# Patient Record
Sex: MALE | Race: WHITE | Employment: OTHER | URBAN - NONMETROPOLITAN AREA
[De-identification: names, ages, dates, MRNs, and addresses within clinical notes are randomized per-mention and may not be internally consistent; named-entity substitution may affect disease eponyms.]

---

## 2017-03-29 ENCOUNTER — OFFICE VISIT (OUTPATIENT)
Age: 67
End: 2017-03-29

## 2017-03-29 VITALS
HEART RATE: 76 BPM | DIASTOLIC BLOOD PRESSURE: 94 MMHG | BODY MASS INDEX: 31.78 KG/M2 | WEIGHT: 222 LBS | TEMPERATURE: 98.2 F | SYSTOLIC BLOOD PRESSURE: 154 MMHG | HEIGHT: 70 IN | RESPIRATION RATE: 16 BRPM

## 2017-03-29 DIAGNOSIS — K63.89 MASS OF CECUM: Primary | ICD-10-CM

## 2017-03-29 DIAGNOSIS — Z01.818 PRE-OP TESTING: ICD-10-CM

## 2017-03-29 PROCEDURE — G8417 CALC BMI ABV UP PARAM F/U: HCPCS | Performed by: SURGERY

## 2017-03-29 PROCEDURE — G8427 DOCREV CUR MEDS BY ELIG CLIN: HCPCS | Performed by: SURGERY

## 2017-03-29 PROCEDURE — 4040F PNEUMOC VAC/ADMIN/RCVD: CPT | Performed by: SURGERY

## 2017-03-29 PROCEDURE — 1036F TOBACCO NON-USER: CPT | Performed by: SURGERY

## 2017-03-29 PROCEDURE — 3017F COLORECTAL CA SCREEN DOC REV: CPT | Performed by: SURGERY

## 2017-03-29 PROCEDURE — 99204 OFFICE O/P NEW MOD 45 MIN: CPT | Performed by: SURGERY

## 2017-03-29 PROCEDURE — G8484 FLU IMMUNIZE NO ADMIN: HCPCS | Performed by: SURGERY

## 2017-03-29 RX ORDER — METRONIDAZOLE 500 MG/1
TABLET ORAL
Qty: 3 TABLET | Refills: 0 | Status: ON HOLD | OUTPATIENT
Start: 2017-03-29 | End: 2017-04-07 | Stop reason: HOSPADM

## 2017-03-29 ASSESSMENT — ENCOUNTER SYMPTOMS
COLOR CHANGE: 0
TROUBLE SWALLOWING: 0
VOICE CHANGE: 0
RECTAL PAIN: 0
WHEEZING: 0
APNEA: 0
RHINORRHEA: 0
VOMITING: 0
SORE THROAT: 0
NAUSEA: 0
EYE REDNESS: 0
FACIAL SWELLING: 0
EYE PAIN: 0
SINUS PRESSURE: 0
EYE DISCHARGE: 0
ABDOMINAL PAIN: 0
CONSTIPATION: 0
CHOKING: 0
CHEST TIGHTNESS: 0
STRIDOR: 0
EYE ITCHING: 0
COUGH: 0
BLOOD IN STOOL: 0
ABDOMINAL DISTENTION: 1
ANAL BLEEDING: 0
SHORTNESS OF BREATH: 0
DIARRHEA: 0
BACK PAIN: 0
PHOTOPHOBIA: 0

## 2017-03-30 LAB
ABSOLUTE BASO #: 0.1 K/UL (ref 0–0.1)
ABSOLUTE EOS #: 0.2 K/UL (ref 0.1–0.4)
ABSOLUTE LYMPH #: 1.5 K/UL (ref 0.8–5.2)
ABSOLUTE MONO #: 0.5 K/UL (ref 0.1–0.9)
ABSOLUTE NEUT #: 3.5 K/UL (ref 1.3–9.1)
ALBUMIN SERPL-MCNC: 4.5 G/DL (ref 3.2–5.3)
ALK PHOSPHATASE: 85 IU/L (ref 35–121)
ALT SERPL-CCNC: 17 IU/L (ref 5–59)
ANION GAP SERPL CALCULATED.3IONS-SCNC: 11 MMOL/L
AST SERPL-CCNC: 19 IU/L (ref 10–42)
BASOPHILS RELATIVE PERCENT: 0.9 %
BILIRUB SERPL-MCNC: 0.7 MG/DL (ref 0.2–1.3)
BILIRUBIN DIRECT: 0.1 MG/DL (ref 0–0.2)
BUN BLDV-MCNC: 14 MG/DL (ref 10–20)
CALCIUM SERPL-MCNC: 10.3 MG/DL (ref 8.7–10.8)
CHLORIDE BLD-SCNC: 102 MMOL/L (ref 95–111)
CO2: 29 MMOL/L (ref 21–32)
CREAT SERPL-MCNC: 0.8 MG/DL (ref 0.5–1.3)
EGFR AFRICAN AMERICAN: 117
EGFR IF NONAFRICAN AMERICAN: 97
EOSINOPHILS RELATIVE PERCENT: 4.1 %
GLUCOSE: 112 MG/DL (ref 70–100)
HCT VFR BLD CALC: 43.6 % (ref 41.4–51)
HEMOGLOBIN: 14.8 G/DL (ref 13.8–17)
LIPASE: 17 IU/L (ref 11–82)
LYMPHOCYTE %: 25.7 %
MCH RBC QN AUTO: 29.8 PG (ref 27–34)
MCHC RBC AUTO-ENTMCNC: 33.9 G/DL (ref 31–36)
MCV RBC AUTO: 87.9 FL (ref 80–100)
MONOCYTES # BLD: 8.9 %
NEUTROPHILS RELATIVE PERCENT: 60.2 %
PDW BLD-RTO: 13.4 % (ref 10.8–14.8)
PLATELETS: 341 K/UL (ref 150–450)
POTASSIUM SERPL-SCNC: 5.1 MMOL/L (ref 3.5–5.4)
RBC: 4.96 M/UL (ref 4–5.5)
SODIUM BLD-SCNC: 137 MMOL/L (ref 134–147)
TOTAL PROTEIN: 7.4 G/DL (ref 5.8–8)
WBC: 5.8 K/UL (ref 3.7–10.8)

## 2017-04-11 ENCOUNTER — TELEPHONE (OUTPATIENT)
Age: 67
End: 2017-04-11

## 2017-04-11 DIAGNOSIS — D37.3 LOW GRADE MUCINOUS NEOPLASM OF APPENDIX: Primary | ICD-10-CM

## 2017-04-12 ENCOUNTER — TELEPHONE (OUTPATIENT)
Age: 67
End: 2017-04-12

## 2017-04-19 ENCOUNTER — OFFICE VISIT (OUTPATIENT)
Age: 67
End: 2017-04-19

## 2017-04-19 VITALS
DIASTOLIC BLOOD PRESSURE: 84 MMHG | HEIGHT: 70 IN | SYSTOLIC BLOOD PRESSURE: 130 MMHG | HEART RATE: 92 BPM | OXYGEN SATURATION: 98 % | BODY MASS INDEX: 30.21 KG/M2 | WEIGHT: 211 LBS | RESPIRATION RATE: 18 BRPM | TEMPERATURE: 97.2 F

## 2017-04-19 DIAGNOSIS — D49.0 NEOPLASM OF APPENDIX: ICD-10-CM

## 2017-04-19 DIAGNOSIS — Z90.49 S/P RIGHT COLECTOMY: Primary | ICD-10-CM

## 2017-04-19 PROCEDURE — 99024 POSTOP FOLLOW-UP VISIT: CPT | Performed by: NURSE PRACTITIONER

## 2017-04-19 ASSESSMENT — ENCOUNTER SYMPTOMS
RHINORRHEA: 0
WHEEZING: 0
VOICE CHANGE: 0
BACK PAIN: 0
EYE DISCHARGE: 0
PHOTOPHOBIA: 0
FACIAL SWELLING: 0
CHEST TIGHTNESS: 0
ABDOMINAL PAIN: 0
RECTAL PAIN: 0
STRIDOR: 0
COUGH: 0
ANAL BLEEDING: 0
CONSTIPATION: 0
ABDOMINAL DISTENTION: 0
EYE REDNESS: 0
NAUSEA: 0
VOMITING: 0
TROUBLE SWALLOWING: 0
CHOKING: 0
DIARRHEA: 0
BLOOD IN STOOL: 0
EYE PAIN: 0
APNEA: 0
COLOR CHANGE: 0
EYE ITCHING: 0
SORE THROAT: 0
SHORTNESS OF BREATH: 0
SINUS PRESSURE: 0

## 2017-05-15 ENCOUNTER — OFFICE VISIT (OUTPATIENT)
Age: 67
End: 2017-05-15

## 2017-05-15 VITALS
RESPIRATION RATE: 16 BRPM | TEMPERATURE: 96.8 F | BODY MASS INDEX: 30.92 KG/M2 | SYSTOLIC BLOOD PRESSURE: 130 MMHG | DIASTOLIC BLOOD PRESSURE: 80 MMHG | HEIGHT: 70 IN | WEIGHT: 216 LBS | OXYGEN SATURATION: 98 % | HEART RATE: 74 BPM

## 2017-05-15 DIAGNOSIS — Z90.49 S/P RIGHT COLECTOMY: Primary | ICD-10-CM

## 2017-05-15 PROCEDURE — 99024 POSTOP FOLLOW-UP VISIT: CPT | Performed by: NURSE PRACTITIONER

## 2017-05-15 ASSESSMENT — ENCOUNTER SYMPTOMS
EYE REDNESS: 0
DIARRHEA: 0
COUGH: 0
STRIDOR: 0
SINUS PRESSURE: 0
SORE THROAT: 0
BACK PAIN: 0
VOMITING: 0
APNEA: 0
COLOR CHANGE: 0
CHOKING: 0
PHOTOPHOBIA: 0
ABDOMINAL DISTENTION: 0
SHORTNESS OF BREATH: 0
EYE ITCHING: 0
FACIAL SWELLING: 0
BLOOD IN STOOL: 0
VOICE CHANGE: 0
CONSTIPATION: 0
RHINORRHEA: 0
EYE DISCHARGE: 0
TROUBLE SWALLOWING: 0
CHEST TIGHTNESS: 0
NAUSEA: 0
ANAL BLEEDING: 0
RECTAL PAIN: 0
WHEEZING: 0
ABDOMINAL PAIN: 0
EYE PAIN: 0

## 2018-04-17 ENCOUNTER — HOSPITAL ENCOUNTER (OUTPATIENT)
Dept: CT IMAGING | Age: 68
Discharge: HOME | End: 2018-04-17
Payer: MEDICARE

## 2018-04-17 DIAGNOSIS — C18.1 MALIGNANT NEOPLASM OF APPENDIX (HCC): ICD-10-CM

## 2018-04-17 LAB
ALBUMIN SERPL-MCNC: 4.4 G/DL (ref 3.5–5.1)
ALP BLD-CCNC: 81 U/L (ref 38–126)
ALT SERPL-CCNC: 18 U/L (ref 11–66)
ANION GAP SERPL CALCULATED.3IONS-SCNC: 12 MEQ/L (ref 8–16)
AST SERPL-CCNC: 20 U/L (ref 5–40)
BASOPHILS # BLD: 1.1 %
BASOPHILS ABSOLUTE: 0.1 THOU/MM3 (ref 0–0.1)
BILIRUB SERPL-MCNC: 0.5 MG/DL (ref 0.3–1.2)
BUN BLDV-MCNC: 21 MG/DL (ref 7–22)
CALCIUM SERPL-MCNC: 9.9 MG/DL (ref 8.5–10.5)
CEA: 1 NG/ML (ref 0–5)
CHLORIDE BLD-SCNC: 101 MEQ/L (ref 98–111)
CO2: 26 MEQ/L (ref 23–33)
CREAT SERPL-MCNC: 0.7 MG/DL (ref 0.4–1.2)
EOSINOPHIL # BLD: 4.5 %
EOSINOPHILS ABSOLUTE: 0.3 THOU/MM3 (ref 0–0.4)
GFR SERPL CREATININE-BSD FRML MDRD: > 90 ML/MIN/1.73M2
GLUCOSE BLD-MCNC: 99 MG/DL (ref 70–108)
HCT VFR BLD CALC: 43.4 % (ref 42–52)
HEMOGLOBIN: 15.2 GM/DL (ref 14–18)
LYMPHOCYTES # BLD: 26.4 %
LYMPHOCYTES ABSOLUTE: 1.5 THOU/MM3 (ref 1–4.8)
MCH RBC QN AUTO: 31.8 PG (ref 27–31)
MCHC RBC AUTO-ENTMCNC: 35 GM/DL (ref 33–37)
MCV RBC AUTO: 90.8 FL (ref 80–94)
MONOCYTES # BLD: 9.8 %
MONOCYTES ABSOLUTE: 0.6 THOU/MM3 (ref 0.4–1.3)
NUCLEATED RED BLOOD CELLS: 0 /100 WBC
PDW BLD-RTO: 14.1 % (ref 11.5–14.5)
PLATELET # BLD: 253 THOU/MM3 (ref 130–400)
PMV BLD AUTO: 9.7 FL (ref 7.4–10.4)
POC CREATININE WHOLE BLOOD: 0.8 MG/DL (ref 0.5–1.2)
POTASSIUM SERPL-SCNC: 4.3 MEQ/L (ref 3.5–5.2)
RBC # BLD: 4.78 MILL/MM3 (ref 4.7–6.1)
SEG NEUTROPHILS: 58.2 %
SEGMENTED NEUTROPHILS ABSOLUTE COUNT: 3.4 THOU/MM3 (ref 1.8–7.7)
SODIUM BLD-SCNC: 139 MEQ/L (ref 135–145)
TOTAL PROTEIN: 8.3 G/DL (ref 6.1–8)
WBC # BLD: 5.8 THOU/MM3 (ref 4.8–10.8)

## 2018-04-17 PROCEDURE — 74177 CT ABD & PELVIS W/CONTRAST: CPT

## 2018-04-17 PROCEDURE — 36415 COLL VENOUS BLD VENIPUNCTURE: CPT

## 2018-04-17 PROCEDURE — 85025 COMPLETE CBC W/AUTO DIFF WBC: CPT

## 2018-04-17 PROCEDURE — 82565 ASSAY OF CREATININE: CPT

## 2018-04-17 PROCEDURE — 80053 COMPREHEN METABOLIC PANEL: CPT

## 2018-04-17 PROCEDURE — 82378 CARCINOEMBRYONIC ANTIGEN: CPT

## 2018-04-17 PROCEDURE — 6360000004 HC RX CONTRAST MEDICATION: Performed by: INTERNAL MEDICINE

## 2018-04-17 RX ADMIN — IOPAMIDOL 85 ML: 755 INJECTION, SOLUTION INTRAVENOUS at 10:57

## 2018-04-17 RX ADMIN — IOHEXOL 50 ML: 240 INJECTION, SOLUTION INTRATHECAL; INTRAVASCULAR; INTRAVENOUS; ORAL at 10:57

## 2018-09-13 ENCOUNTER — OFFICE VISIT (OUTPATIENT)
Dept: FAMILY MEDICINE CLINIC | Age: 68
End: 2018-09-13
Payer: MEDICARE

## 2018-09-13 VITALS
DIASTOLIC BLOOD PRESSURE: 88 MMHG | TEMPERATURE: 97.4 F | WEIGHT: 222.2 LBS | OXYGEN SATURATION: 96 % | RESPIRATION RATE: 10 BRPM | HEIGHT: 69 IN | SYSTOLIC BLOOD PRESSURE: 148 MMHG | BODY MASS INDEX: 32.91 KG/M2 | HEART RATE: 77 BPM

## 2018-09-13 DIAGNOSIS — R61 EXCESSIVE SWEATING: Primary | ICD-10-CM

## 2018-09-13 DIAGNOSIS — R41.3 MEMORY DIFFICULTIES: ICD-10-CM

## 2018-09-13 DIAGNOSIS — R00.2 PALPITATION: ICD-10-CM

## 2018-09-13 DIAGNOSIS — R20.0 NUMBNESS AND TINGLING IN BOTH HANDS: ICD-10-CM

## 2018-09-13 DIAGNOSIS — F41.9 ANXIETY: ICD-10-CM

## 2018-09-13 DIAGNOSIS — R68.89 HEAT INTOLERANCE: ICD-10-CM

## 2018-09-13 DIAGNOSIS — R06.7 SNEEZE: ICD-10-CM

## 2018-09-13 DIAGNOSIS — E78.5 ELEVATED LIPIDS: ICD-10-CM

## 2018-09-13 DIAGNOSIS — R05.9 COUGH: ICD-10-CM

## 2018-09-13 DIAGNOSIS — R63.5 WEIGHT GAIN: ICD-10-CM

## 2018-09-13 DIAGNOSIS — R73.9 BLOOD GLUCOSE ELEVATED: ICD-10-CM

## 2018-09-13 DIAGNOSIS — H53.9 VISION ABNORMALITIES: ICD-10-CM

## 2018-09-13 DIAGNOSIS — R52 ACHES: ICD-10-CM

## 2018-09-13 DIAGNOSIS — B35.3 TINEA PEDIS OF BOTH FEET: ICD-10-CM

## 2018-09-13 DIAGNOSIS — K59.01 SLOW TRANSIT CONSTIPATION: ICD-10-CM

## 2018-09-13 DIAGNOSIS — K90.9 DIARRHEA DUE TO MALABSORPTION: ICD-10-CM

## 2018-09-13 DIAGNOSIS — G47.9 SLEEP DIFFICULTIES: ICD-10-CM

## 2018-09-13 DIAGNOSIS — R20.2 NUMBNESS AND TINGLING IN BOTH HANDS: ICD-10-CM

## 2018-09-13 DIAGNOSIS — R19.7 DIARRHEA DUE TO MALABSORPTION: ICD-10-CM

## 2018-09-13 DIAGNOSIS — R42 LIGHTHEADED: ICD-10-CM

## 2018-09-13 DIAGNOSIS — T14.8XXA BRUISING: ICD-10-CM

## 2018-09-13 PROCEDURE — 3017F COLORECTAL CA SCREEN DOC REV: CPT | Performed by: FAMILY MEDICINE

## 2018-09-13 PROCEDURE — 1123F ACP DISCUSS/DSCN MKR DOCD: CPT | Performed by: FAMILY MEDICINE

## 2018-09-13 PROCEDURE — 4040F PNEUMOC VAC/ADMIN/RCVD: CPT | Performed by: FAMILY MEDICINE

## 2018-09-13 PROCEDURE — 99204 OFFICE O/P NEW MOD 45 MIN: CPT | Performed by: FAMILY MEDICINE

## 2018-09-13 PROCEDURE — G8427 DOCREV CUR MEDS BY ELIG CLIN: HCPCS | Performed by: FAMILY MEDICINE

## 2018-09-13 PROCEDURE — G8417 CALC BMI ABV UP PARAM F/U: HCPCS | Performed by: FAMILY MEDICINE

## 2018-09-13 PROCEDURE — 1101F PT FALLS ASSESS-DOCD LE1/YR: CPT | Performed by: FAMILY MEDICINE

## 2018-09-13 PROCEDURE — 1036F TOBACCO NON-USER: CPT | Performed by: FAMILY MEDICINE

## 2018-09-13 ASSESSMENT — PATIENT HEALTH QUESTIONNAIRE - PHQ9
SUM OF ALL RESPONSES TO PHQ QUESTIONS 1-9: 0
SUM OF ALL RESPONSES TO PHQ QUESTIONS 1-9: 0
SUM OF ALL RESPONSES TO PHQ9 QUESTIONS 1 & 2: 0
1. LITTLE INTEREST OR PLEASURE IN DOING THINGS: 0
2. FEELING DOWN, DEPRESSED OR HOPELESS: 0

## 2018-09-13 ASSESSMENT — ENCOUNTER SYMPTOMS
DIARRHEA: 1
CONSTIPATION: 1
COUGH: 1
COLOR CHANGE: 1
BACK PAIN: 1
SINUS PAIN: 1

## 2018-09-13 NOTE — PROGRESS NOTES
2018    Dangelo Dai (:  1950) is a 76 y.o. male, here for evaluation of the following medical concerns:    HPI  Dangelo Dai is a 76 y.o. White male. Mayi Jones  presents to the [address] [place] [address] today for   Chief Complaint   Patient presents with   Maryellen Howell Established New Doctor     [facility]- wife is a patient already- last set of labs at [facility] path    Other     sex drive, sneezing attacks,sciatic nerve, fingernails    Urinary Frequency     nightly, irritable bowel-comes and goes    Other     bruise easy, weight maintance, restless leg, balance,     Memory Loss    Back Pain    Joint Pain    Anxiety    Cough    Skin Problem     rash and nails on right hand   ,  and;   1. Excessive sweating    2. Weight gain    3. Sneeze    4. Vision abnormalities    5. Cough    6. Palpitation    7. Slow transit constipation    8. Diarrhea due to malabsorption    9. Heat intolerance    10. Aches    11. Tinea pedis of both feet    12. Lightheaded    13. Numbness and tingling in both hands    14. Bruising    15. Sleep difficulties    16. Memory difficulties    17. Anxiety      I have reviewed Dangelo Dai medical, surgical and other pertinent history in detail, and have updated medication and allergy information in the computerized patient record. Clinical Care Team:     -Referring Provider for today's consult: Self Referred  -Primary Care Provider: Bobby Gonzales MD    Medical/Surgical History:   He  has a past medical history of Arthritis. His  has a past surgical history that includes Colonoscopy; Inguinal hernia repair; knee surgery; Dilatation, esophagus; and other surgical history (2017). Family/Social History:     His family history includes Cancer in his father; Heart Disease in his father. He  reports that he quit smoking about 16 years ago. His smoking use included Cigarettes. He has a 25.00 pack-year smoking history.  He has never used smokeless tobacco. He reports that he drinks alcohol. He reports that he does not use drugs. Medications/Allergies/Immunizations:     His current medication(s) include   Current Outpatient Prescriptions:     FENUGREEK PO, Take 3 capsules by mouth daily as needed Contains Thyme, Disp: , Rfl:     NONFORMULARY, ALJ- for respiratory support- 1-2 cap prn CBD OIL- 1 cap daily prn AnxiousLess- 1 capsule daily, Disp: , Rfl:     Omega 3 1200 MG CAPS, Take 1 capsule by mouth 2 times daily WITH OTHER VITAMINS , Disp: , Rfl:     S[place] 160 MG CAPS, Take 1 capsule by mouth 3 times daily, Disp: , Rfl:     Red Yeast Rice Extract (RED YEAST RICE PO), Take 1 capsule by mouth 2 times daily , Disp: , Rfl:   Allergies: Penicillins,  Immunizations: There is no immunization history on file for this patient. History of Present Illness:     Marlys had concerns including Established New Doctor (Neha Cannon- wife is a patient already- last set of labs at [address] path); Other (sex drive, sneezing attacks,sciatic nerve, fingernails); Urinary Frequency (nightly, irritable bowel-comes and goes); Other (bruise easy, weight maintance, restless leg, balance, ); Memory Loss; Back Pain; Joint Pain; Anxiety; Cough; and Skin Problem (rash and nails on right hand). Jennifer Allen  presents to the [address] today for;   Chief Complaint   Patient presents with   Jericho Pro New Doctor     Deo Heard- wife is a patient already- last set of labs at [address]    Other     sex drive, sneezing attacks,sciatic nerve, fingernails    Urinary Frequency     nightly, irritable bowel-comes and goes    Other     bruise easy, weight maintance, restless leg, balance,     Memory Loss    Back Pain    Joint Pain    Anxiety    Cough    Skin Problem     rash and nails on right hand   , ,  abnormal labs follow up and these conditions as he  Is looking today for:     1. Excessive sweating    2. Weight gain    3. Sneeze    4. Vision abnormalities    5.  Cough History:   Diagnosis Date    Arthritis        Past Surgical History:   Procedure Laterality Date    COLONOSCOPY      DILATATION, ESOPHAGUS      INGUINAL HERNIA REPAIR      KNEE SURGERY      OTHER SURGICAL HISTORY  04/04/2017    robotic iliocecectomy       Social History     Social History    Marital status:      Spouse name: N/A    Number of children: N/A    Years of education: N/A     Occupational History    Not on file. Social History Main Topics    Smoking status: Former Smoker     Packs/day: 1.00     Years: 25.00     Types: Cigarettes     Quit date: 2002    Smokeless tobacco: Never Used    Alcohol use Yes      Comment: occasional    Drug use: No    Sexual activity: Not on file     Other Topics Concern    Not on file     Social History Narrative    No narrative on file        Family History   Problem Relation Age of Onset    Heart Disease Father     Cancer Father        Vitals:    09/13/18 1025 09/13/18 1043   BP: (!) 148/90 (!) 148/88   Site: Left Upper Arm Left Upper Arm   Position: Sitting Sitting   Cuff Size: Large Adult Large Adult   Pulse: 77    Resp: 10    Temp: 97.4 °F (36.3 °C)    TempSrc: Oral    SpO2: 96%    Weight: 222 lb 3.2 oz (100.8 kg)    Height: 5' 8.5\" (1.74 m)      Estimated body mass index is 33.29 kg/m² as calculated from the following:    Height as of this encounter: 5' 8.5\" (1.74 m). Weight as of this encounter: 222 lb 3.2 oz (100.8 kg). Physical Exam   Constitutional: He is oriented to person, place, and time. He appears well-developed and well-nourished. HENT:   Head: Normocephalic. Pulmonary/Chest: Effort normal.   Neurological: He is alert and oriented to person, place, and time. Psychiatric: He has a normal mood and affect. Thought content normal.   Nursing note and vitals reviewed. ASSESSMENT/PLAN:      No Follow-up on file.   Laboratory Data:   Lab results were searched in Care Everywhere and/or those brought by the pateint were reviewed [id number]. [employer] web site offered to patient to review at their convenience by staff with login information    - www.efaeducation. org site reviewed with the patient so they can identify better foods    - www.cornicopia. org site reviewed with the patient so they can reduce carrageenan by reviewing the carrageenan buyers guide on that site and picking safer foods    Note:   I have discussed with the patient that with all nutraceuticals, there is often mixed data and emerging research which needs to be monitored; as well as an array of NIH fact sheets on nutrients and supplements. If I have recommended cinnamon at the request of this patient to assist them in control of their blood sugar, triglyceride and or weight issues. I discussed that the patient's clinical use of cinnamon bark, calcium, magnesium, Vitamin D and pharmaceutical grade CVS #[id number] fish oil or triple-strength fish oil, and balanced B-50 or B-100 time-released B complex which does not contain Vit C in the tablet. The dose will be for a time-limited trial to determine their individual effectiveness and tolerance in this patient. I also referred the patient to the reviewing such items as consumerlabs. com NMCD: Nutrition, Metabolism, and Cardiovascular Diseases (journal) and NCAAM.gov,  Searching www.nih.gov for any concerns about long-term use and hepatotoxicity of cinnamon and other nutrients and suggest they frequently search nih.gov for the latest non-proprietary information on nutriceuticals as well as consider a subscription to eDiets.com for details on reviewed supplements, or at the least review the nutrient files at [facility], [address] bark, an insulin mimetic, reduces some High Carbohydrate Dietary Impacts.   Methylhydroxychalcone polymers insulin-enhancing properties in fat cells are responsible for enhanced glucose uptake, inhibiting hepatic HMG-CoA reductase and lowers lipids. www.jacn. org/content/20/4/327.full     But cinnamon with additives such as Cinnamon Extract are not effective as insulin mimetics. https://www.silveira.net/     Nutrients for Start up from [employer] or "[employer] for ease to get started now ;  Emile Zamora has some useable products;  - Triple Strength Fish Oil, enteric coated  - Vit D 3 5000 IU gel caps  - Iron ferrous sulfat 325 mg tabs  - Centrum Silver look-a-like for most patients not needing iron, or  - Centrum plain look-a-like if need iron    Local pharmacy chains such as [facility], [facility], [facility].  [employer];  - Triple Strength Fish Oil (enteric coated if available) or    If not enteric coated, can take from freezer for less burps  - B-50 or B-100 time released balanced B complex tabs not containing Vit C in tablet  - Cinnamon bark 500 mg (with Chromium but not extracts)   some brands list 1000 mg / serving of 2 500 mg capsules,    some brands have 1000 mg caps with the chromium but capsule size is huge  - Calcium carbonate/citrate, magnesium oxide/citrate, Vit D 3  as 3-4 tabs/caps/serving     Some Local Brands may contain Zinc which is acceptable for the first bottle or two  - Magnesium oxide 250 mg tabs for those having < 2 bowel movements daily  - Magnesium citrate TABLETS  or Slow Mag, or magnesium gluconate if having > 2 bowel movement/day  - Centrum Silver or look-a-like for most patients, Centrum plain or look-a-like with iron  - Vitamin D-3 comes as 1,000 IU or 2,000 IU or 5,000 IU gel caps or Liquid drops      Some brands containing or derived from soy oil or corn oil are OK if not allergic to soy  - Elemental Iron 65 mg tabs at bedtime is available over the counter if need more iron     Usually turns bowel movements grey, green or black but not a concern  - Apricot Kernel Oil (by Now) for dry skin and use in sensitive perineal area skin    Nutrients for ongoing use by Mail order for less expense from www.amazon. com, [employer], www.Tinychat   - Triple Strength Fish Oil , Softgels   - B-100 time released balanced B complex   - Cinnamon bark 500 mg with or without Chromium but not extract (ineffective)  - Calcium carbonate 1000 mg, Magnesium oxide 500 mg, Vit D 3 best as individual  - Magnesium oxide 250 mg, 400 mg, or 500 mg tabs if < 2 bowel movements daily  - Multivitamin Seniors for most patients, One Daily or Item with iron  - Vit D 3  1,000IU ,   2,000 IU,  5,000 IU, can start low and buy larger on 2nd bottle     Some brands containing or derived from soy oil or corn oil are OK if not allergic to soy    Nutrients for Special Needs by Mail order for less expense;  - Elemental Iron 65 mg tabs if need more iron for low iron on labs    Usually turns bowel movements grey, green or black but not a concern  - Time released Niacin 250 mg for cold intolerance, low libido or impotence  - DHEA 10 mg, 25 mg, or 50 mg for improving DHEA levels on labs if having Fatigue    If stools too loose substitute for your Magnesium oxide using;   Magnesium citrate 200 mg tabs(NOT liquid, NOT caps) amazon. com  Magnesium gluconate 550 mg by Moses at BlogRadio. com  Magnesium chloride foot soaks or body sprays  www.[employer]. [employer]   Magnesium chloride flakes for soaks, or magnesium spray or cream    Food Drink Symptom Log;  I asked this patient to track these items and any other symptoms on their list on a weekly basis to document their progress or lack of same.  This can be done on the symptom tracking sheet available to them at today's visit but looks like this:                                                      Rate on scale of 0-10 with zero = not noticeable  Symptom:                            Week 1               2                 3                 4               Etc            Hair loss    Foot cramps    Paresthesia    Aches    IBS (irritable bowel)    Constipation    Diarrhea  Nocturia    (up to bathroom at night)    Fatigue/Energy level    Stress      On the other side of the sheet they can track their food, drink, environment, activity, symptoms etc      Avoiding Latex-like proteins in my foods; Avocados, Bananas, Celery, Figs & Kiwi proteins have latex-like proteins to inflame our immune systems, see the 51 latex-like foods list  How Can I Have A Latex Allergy? Eating foods with latex-like protein exposes us to latex allergies. Our body cannot tell the difference between these latex-like proteins and latex from rubber products since many people are allergic to fruit, vegetables and latex. Read labels on pre-packaged foods. This list to avoid is only a guide if you are known allergic to latex or have a latex rash on your chin, cheeks and lines on your neck and chest. The amount of latex is different in each food product or fruit variety. Foods to Avoid out of Season if not grown locally: Melon, Nectarine, Papaya, Cherry, Passion fruit, Plum, Chestnuts, and Tomato. Avocado, Banana, Celery, Figs, and Kiwi always contain Latex-like protein and are almost universally toxic    Whats in Season? Strawberries taste better in June than December because June is strawberry season so buy locally grown produce \"in season\" for the best flavor, cost and less Latex. Locally grown produce not only tastes great requires little of no ethylene exposure in food distribution so has less latex content. Out of season, use canned, frozen or dried since processed ripe and are latex lower!!!   Month     [place] Locally Grown Produce  January, February, March: use canned, frozen or dried fruits since lower in latex  April; asparagus, radishes  May; asparagus, broccoli, green onions, greens, peas, radishes, rhubarb  June; asparagus, beets, beans, broccoli, cabbage, cantaloupe, carrots, green onions, greens, lettuce, onions, parsley, peas, radishes, rhubarb, strawberries, watermelons  July; beans, beets, blueberries, broccoli, cabbage, cantaloupe, carrots, cauliflower, celery, cucumbers, eggplant, grapes, green onions, greens, lettuce, onions, parsley, peas, peaches, bell peppers, potatoes, radishes, summer raspberries, squash, sweet corn, tomatoes, turnips, watermelons  August; apples, beans, beets, blueberries, cabbage, cantaloupe, carrots, cauliflower, celery, cucumbers, eggplant, grapes, green onions, greens, lettuce, onions, parsley, peas, peaches, pears, bell peppers, potatoes, radishes, squash, sweet corn, tomatoes, turnips, watermelons  September; apples, beans, beets, blueberries, cabbage, cantaloupe, carrots, cauliflower, celery, cucumbers, eggplant, grapes, green onions, greens, lettuce, onions, parsley, peas, peaches, pears, bell peppers, plums, potatoes, pumpkins, radishes, fall red raspberries, squash, sweet corn, tomatoes, turnips, watermelons  October; apples, beets, broccoli, cabbage, carrots, cauliflower, celery, green onions, greens, lettuce, parsley, peas, pears, potatoes, pumpkins, radishes, fall red raspberries, squash, turnips  November; broccoli, cabbage, carrots, parsley, pears, peas  December: use canned, frozen or dried fruits since lower in latex    Up to half of latex-sensitive patients show allergic reactions to fruits (avocados, bananas, kiwifruits, papayas, peaches),   Annals of Allergy, 1994. These plants contain the same proteins that are allergens in latex. People with fruit allergies should warn physicians before undergoing procedures which may cause anaphylactic reaction if in contact with latex gloves. Some of the common foods with defined cross-reactivity to latex are avocado, banana, kiwi, chestnut, raw potato, tomato, stone fruits (e.g., peach, cherry), hazelnut, melons, celery, carrot, apple, pear, papaya, and almond.   Foods with less well-defined cross-reactivity to latex are peanuts, peppers, citrus fruits, coconut, pineapple, chivo, fig, passion fruit, Ugli fruit, and grape    This fruit/latex

## 2018-09-13 NOTE — PATIENT INSTRUCTIONS
1. You may receive a survey about your visit with us today. The feedback from our patients helps us identify what is working well and where the service to all patients can be enhanced. Thank you! 2. Please bring in ALL medications BOTTLES, including inhalers, herbal supplements, over the counter, prescribed & non-prescribed medicine. The office would like actual medication bottles and a list.  3. Please note our OFFICE POLICIES:  a. Prior to getting your labs drawn, please check with your insurance company for benefits and eligibility of lab services. Often, insurance companies cover certain tests for preventative visits only. It is patient's responsibility to see what is covered. b. We are unable to change a diagnosis after the test has been performed. c. Lab orders will not be re-printed. Please hold onto your original lab orders and take them to your lab to be completed. d. If you no show your schedule appointment three times, you be dismissed from this practice. e. Welford prince edward isl must be completed 24 hours prior to your schedule appointment. 4. If the list below has been completed, PLEASE FAX RECORDS TO OUR OFFICE @ [phone]. Once the records have been received we will update your records at our office. Health Maintenance Due   Topic Date Due    Hepatitis C screen  1950    DTaP/Tdap/Td vaccine (1 - Tdap) 08/04/1969    Lipid screen  08/04/1990    Shingles Vaccine (1 of 2 - 2 Dose Series) 08/04/2000    Colon cancer screen colonoscopy  08/04/2000    Pneumococcal low/med risk (1 of 2 - PCV13) 08/04/2015    Flu vaccine (1) 09/01/2018       Schedule your 2018 flu vaccine with Dr. Orville Green call [phone]!   [facility], for anyone 9 years or older   [address]   Every Monday   8:00am-11:00am and 1:00pm-3:00pm

## 2018-09-14 LAB
ABSOLUTE BASO #: 0.1 K/UL (ref 0–0.1)
ABSOLUTE EOS #: 0.3 K/UL (ref 0.1–0.4)
ABSOLUTE LYMPH #: 1.8 K/UL (ref 0.8–5.2)
ABSOLUTE MONO #: 0.5 K/UL (ref 0.1–0.9)
ABSOLUTE NEUT #: 3.4 K/UL (ref 1.3–9.1)
AMYLASE: 55 U/L (ref 28–100)
ANTI-NUCLEAR ANTIBODY (ANA): NORMAL
AVERAGE GLUCOSE: 111 MG/DL (ref 66–114)
BASOPHILS RELATIVE PERCENT: 1 %
EOSINOPHILS RELATIVE PERCENT: 5.6 %
HBA1C MFR BLD: 5.5 % (ref 4.2–5.8)
HCT VFR BLD CALC: 40.2 % (ref 41.4–51)
HEMOGLOBIN: 14.3 G/DL (ref 13.8–17)
HIGH SENSITIVE C-REACTIVE PROTEIN: 2.7 MG/L
LIPASE: 33 U/L (ref 13–60)
LYMPHOCYTE %: 29.3 %
MAGNESIUM: 2.2 MG/DL (ref 1.6–2.6)
MCH RBC QN AUTO: 31 PG (ref 27–34)
MCHC RBC AUTO-ENTMCNC: 35.6 G/DL (ref 31–36)
MCV RBC AUTO: 87 FL (ref 80–100)
MONOCYTES # BLD: 7.9 %
NEUTROPHILS RELATIVE PERCENT: 56 %
PDW BLD-RTO: 13.4 % (ref 10.8–14.8)
PLATELETS: 282 K/UL (ref 150–450)
RBC: 4.62 M/UL (ref 4–5.5)
RHEUMATOID FACTOR: <10 IU/ML
SEDIMENTATION RATE, ERYTHROCYTE: 25 MM/HR (ref 0–20)
T3 TOTAL: 99 NG/DL (ref 80–200)
T4 TOTAL: 7.6 UG/DL (ref 4.5–12)
TESTOSTERONE TOTAL: 312 NG/DL (ref 300–720)
TSH SERPL DL<=0.05 MIU/L-ACNC: 1.91 UIU/ML (ref 0.4–4.1)
URIC ACID: 6.3 MG/DL (ref 3.7–8)
WBC: 6.1 K/UL (ref 3.7–10.8)

## 2018-09-15 LAB
ALBUMIN SERPL-MCNC: 4.7 G/DL (ref 3.5–5.2)
ALK PHOSPHATASE: 89 U/L (ref 39–118)
ALT SERPL-CCNC: 24 U/L (ref 5–50)
ANION GAP SERPL CALCULATED.3IONS-SCNC: 12 MEQ/L (ref 10–19)
AST SERPL-CCNC: 20 U/L (ref 9–50)
BILIRUB SERPL-MCNC: 0.6 MG/DL
BILIRUBIN DIRECT: <0.2 MG/DL
BUN BLDV-MCNC: 17 MG/DL (ref 8–23)
CALCIUM SERPL-MCNC: 9.6 MG/DL (ref 8.5–10.5)
CHLORIDE BLD-SCNC: 100 MEQ/L (ref 95–107)
CHOLESTEROL/HDL RATIO: 2.8
CHOLESTEROL: 219 MG/DL
CO2: 26 MEQ/L (ref 19–31)
CREAT SERPL-MCNC: 1 MG/DL (ref 0.8–1.4)
EGFR AFRICAN AMERICAN: 89.2 ML/MIN/1.73 M2
EGFR IF NONAFRICAN AMERICAN: 77 ML/MIN/1.73 M2
GLUCOSE: 114 MG/DL (ref 70–99)
HDLC SERPL-MCNC: 77.6 MG/DL
LDL CHOLESTEROL CALCULATED: 109 MG/DL
LDL/HDL RATIO: 1.4
POTASSIUM SERPL-SCNC: 4.2 MEQ/L (ref 3.5–5.4)
SODIUM BLD-SCNC: 138 MEQ/L (ref 135–146)
TOTAL PROTEIN: 7.2 G/DL (ref 6.1–8.3)
TRIGL SERPL-MCNC: 163 MG/DL
VLDLC SERPL CALC-MCNC: 33 MG/DL

## 2018-09-16 LAB
CANDIDA IGA AB: 1.91 EV
CANDIDA IGG AB: 1.83 EV
CANDIDA IGM AB: 0.41 EV

## 2018-09-17 LAB — DEHYDROEPIANDROSTERONE: 1.8 NG/ML (ref 1.8–12.5)

## 2018-09-18 LAB
DHEAS (DHEA SULFATE): 111 UG/DL (ref 36–249)
GLIADIN ANTIBODIES IGA: 2.3 U/ML
GLIADIN ANTIBODIES IGG: 1.8 U/ML
HOMOCYSTINE, SERUM: 13 UMOL/L
PARATHYROID HORMONE INTACT: 47 PG/ML (ref 11–67)
TESTOSTERONE FREE-MALE: 30.2 PG/ML (ref 47–244)
THYROID PEROXIDASE ANTIBODY: 1 IU/ML
VITAMIN D 25-HYDROXY: 27 NG/ML

## 2018-09-27 ENCOUNTER — OFFICE VISIT (OUTPATIENT)
Dept: FAMILY MEDICINE CLINIC | Age: 68
End: 2018-09-27
Payer: MEDICARE

## 2018-09-27 VITALS
SYSTOLIC BLOOD PRESSURE: 138 MMHG | WEIGHT: 220.8 LBS | RESPIRATION RATE: 10 BRPM | TEMPERATURE: 98.3 F | BODY MASS INDEX: 32.7 KG/M2 | DIASTOLIC BLOOD PRESSURE: 82 MMHG | HEIGHT: 69 IN

## 2018-09-27 DIAGNOSIS — R73.9 BLOOD GLUCOSE ELEVATED: ICD-10-CM

## 2018-09-27 DIAGNOSIS — R63.5 WEIGHT GAIN: ICD-10-CM

## 2018-09-27 DIAGNOSIS — R06.7 SNEEZE: ICD-10-CM

## 2018-09-27 DIAGNOSIS — R61 EXCESSIVE SWEATING: ICD-10-CM

## 2018-09-27 DIAGNOSIS — B35.3 TINEA PEDIS OF BOTH FEET: ICD-10-CM

## 2018-09-27 DIAGNOSIS — K63.89 COLONIC MASS: ICD-10-CM

## 2018-09-27 DIAGNOSIS — G47.9 SLEEP DIFFICULTIES: ICD-10-CM

## 2018-09-27 DIAGNOSIS — R68.89 HEAT INTOLERANCE: ICD-10-CM

## 2018-09-27 DIAGNOSIS — R52 ACHES: ICD-10-CM

## 2018-09-27 DIAGNOSIS — R20.2 NUMBNESS AND TINGLING IN BOTH HANDS: ICD-10-CM

## 2018-09-27 DIAGNOSIS — R19.7 DIARRHEA DUE TO MALABSORPTION: ICD-10-CM

## 2018-09-27 DIAGNOSIS — R00.2 PALPITATION: ICD-10-CM

## 2018-09-27 DIAGNOSIS — R41.3 MEMORY DIFFICULTIES: ICD-10-CM

## 2018-09-27 DIAGNOSIS — F41.9 ANXIETY: ICD-10-CM

## 2018-09-27 DIAGNOSIS — T14.8XXA BRUISING: ICD-10-CM

## 2018-09-27 DIAGNOSIS — R42 LIGHTHEADED: ICD-10-CM

## 2018-09-27 DIAGNOSIS — R20.0 NUMBNESS AND TINGLING IN BOTH HANDS: ICD-10-CM

## 2018-09-27 DIAGNOSIS — E78.5 ELEVATED LIPIDS: ICD-10-CM

## 2018-09-27 DIAGNOSIS — K90.9 DIARRHEA DUE TO MALABSORPTION: ICD-10-CM

## 2018-09-27 DIAGNOSIS — R05.9 COUGH: ICD-10-CM

## 2018-09-27 DIAGNOSIS — K59.01 SLOW TRANSIT CONSTIPATION: ICD-10-CM

## 2018-09-27 DIAGNOSIS — H53.9 VISION ABNORMALITIES: ICD-10-CM

## 2018-09-27 PROCEDURE — G8427 DOCREV CUR MEDS BY ELIG CLIN: HCPCS | Performed by: FAMILY MEDICINE

## 2018-09-27 PROCEDURE — 99214 OFFICE O/P EST MOD 30 MIN: CPT | Performed by: FAMILY MEDICINE

## 2018-09-27 PROCEDURE — 4040F PNEUMOC VAC/ADMIN/RCVD: CPT | Performed by: FAMILY MEDICINE

## 2018-09-27 PROCEDURE — 1036F TOBACCO NON-USER: CPT | Performed by: FAMILY MEDICINE

## 2018-09-27 PROCEDURE — 1101F PT FALLS ASSESS-DOCD LE1/YR: CPT | Performed by: FAMILY MEDICINE

## 2018-09-27 PROCEDURE — 1123F ACP DISCUSS/DSCN MKR DOCD: CPT | Performed by: FAMILY MEDICINE

## 2018-09-27 PROCEDURE — G8417 CALC BMI ABV UP PARAM F/U: HCPCS | Performed by: FAMILY MEDICINE

## 2018-09-27 PROCEDURE — 3017F COLORECTAL CA SCREEN DOC REV: CPT | Performed by: FAMILY MEDICINE

## 2018-09-27 RX ORDER — AMPICILLIN TRIHYDRATE 250 MG
3 CAPSULE ORAL
COMMUNITY
End: 2020-06-09

## 2018-09-27 RX ORDER — CHLORAL HYDRATE 500 MG
2000 CAPSULE ORAL
COMMUNITY
End: 2020-06-09

## 2018-09-27 ASSESSMENT — ENCOUNTER SYMPTOMS
GASTROINTESTINAL NEGATIVE: 1
RESPIRATORY NEGATIVE: 1

## 2018-09-27 NOTE — PROGRESS NOTES
calculated from the following:    Height as of this encounter: 5' 8.5\" (1.74 m). Weight as of this encounter: 220 lb 12.8 oz (100.2 kg). Physical Exam    ASSESSMENT/PLAN:      No Follow-up on file. Laboratory Data:   Lab results were searched in Care Everywhere and/or those brought by the pateint were reviewed today with Hitesh Richardson and he has a copy of their most recent labs to take home with them as noted below;       Imaging Data:   Imaging Data:       Assessment & Plan:       Impression:  1. Colonic mass    2. Excessive sweating    3. Weight gain    4. Sneeze    5. Vision abnormalities    6. Cough    7. Palpitation    8. Slow transit constipation    9. Diarrhea due to malabsorption    10. Heat intolerance    11. Aches    12. Tinea pedis of both feet    13. Lightheaded    14. Numbness and tingling in both hands    15. Bruising    16. Sleep difficulties    17. Memory difficulties    18. Anxiety    19. Elevated lipids    20. Blood glucose elevated      Assessment and Plan:  After reviewing the patients chief complaints, reviewing their lab findings in great detail (with the patient and those accompanying them) which correlate to their chief complaints, symptoms, and or medical conditions; suggestions were made relating to changes in diet and or supplements which may improve the complaints and which will be reflected in their future lab findings; Chief Complaint   Patient presents with    Follow-up     wee protocal    Rash     hands getting spots,  had 3 brothers bread with soft boiled egg?  Other     discuss foods   ;    Plans for the next visits:  - Abnormal and non-optimal Labs were ordered today to be repeated in the next 120-365 days to assess changes from adjustments in nutrition and or nutrients.    - Patient instructed when having a blood draw to ask the  to divide their lab draws into multiple draws over several days if not feeling good at the time of the lab draw or if either prefers to do several smaller blood draws over several days  - Patient instructed to check with insurer before each lab draw and to go to the lab which the insurer directs them for the most cost effective lab draw with the least patient's cost  - Alfreda Boyer  will be scheduled subsequent to those results. Donaldo Goel will bring in his drink and food log to his next visit    Chronic Problems Addressed on this Visit:                                   1.  Intensity of Service; Uncontrolled items at this visit; Chief Complaint   Patient presents with    Follow-up     wee protocal    Rash     hands getting spots,  had 3 brothers bread with soft boiled egg?  Other     discuss foods   ; Improved items reviewed at this visit; Stable items noted at this visit;  2. Patient's foods and drinks were reviewed with the patient,       - Alfreda Boyer will bring food+drink symptom log to next visit for inclusion in their record      - 75 better food list reviewed & given to patient with the omega 6 food list to avoid         - Gluten in corn and oats abstracts sheet reviewed and given to the patient today   3. Greater than 25 minutes were spent face to face on this visit of which >50% was for counseling and coordination of care. Patient's foods, drinks and supplements were reviewed with the patient,   - they will bring a food drink symptom log to future visits for inclusion in their record    - 75 better food list reviewed & given to patient along with the omega 6 food list to avoid      - Gluten in corn and oats abstracts sheet reviewed and given to the patient today    - 51 Foods containing Latex-like proteins was reviewed and copy given to the patient     - Nutrient Supplements list provided and copy given to the patient     - [email or web address]. [employer] web site offered to patient to review at their convenience by staff with login information    - www.[employer]. org site reviewed with the patient so they can identify better foods    - www.cornicopia. org site reviewed with the patient so they can reduce carrageenan by reviewing the carrageenan buyers guide on that site and picking safer foods    Note:   I have discussed with the patient that with all nutraceuticals, there is often mixed data and emerging research which needs to be monitored; as well as an array of NIH fact sheets on nutrients and supplements. If I have recommended cinnamon at the request of this patient to assist them in control of their blood sugar, triglyceride and or weight issues. I discussed that the patient's clinical use of cinnamon bark, calcium, magnesium, Vitamin D and pharmaceutical grade CVS #23168_[id number] fish oil or triple-strength fish oil, and balanced B-50 or B-100 time-released B complex which does not contain Vit C in the tablet. The dose will be for a time-limited trial to determine their individual effectiveness and tolerance in this patient. I also referred the patient to the reviewing such items as consumerlabs. com NMCD: Nutrition, Metabolism, and Cardiovascular Diseases (journal) and NCAAM.gov,  Searching www.nih.gov for any concerns about long-term use and hepatotoxicity of cinnamon and other nutrients and suggest they frequently search nih.gov for the latest non-proprietary information on nutriceuticals as well as consider a subscription to [employer] for details on reviewed supplements, or at the least review the nutrient files at [facility], [address] bark, an insulin mimetic, reduces some High Carbohydrate Dietary Impacts. Methylhydroxychalcone polymers insulin-enhancing properties in fat cells are responsible for enhanced glucose uptake, inhibiting hepatic HMG-CoA reductase and lowers lipids. www.jacn. org/content/20/4/327.full     But cinnamon with additives such as Cinnamon Extract are not effective as insulin mimetics. - Cinnamon bark 500 mg with or without Chromium but not extract (ineffective)  - Calcium carbonate 1000 mg, Magnesium oxide 500 mg, Vit D 3 best as individual  - Magnesium oxide 250 mg, 400 mg, or 500 mg tabs if < 2 bowel movements daily  - Multivitamin Seniors for most patients, One Daily or Item with iron  - Vit D 3  1,000IU ,   2,000 IU,  5,000 IU, can start low and buy larger on 2nd bottle     Some brands containing or derived from soy oil or corn oil are OK if not allergic to soy    Nutrients for Special Needs by Mail order for less expense;  - Elemental Iron 65 mg tabs if need more iron for low iron on labs    Usually turns bowel movements grey, green or black but not a concern  - Time released Niacin 250 mg for cold intolerance, low libido or impotence  - DHEA 10 mg, 25 mg, or 50 mg for improving DHEA levels on labs if having Fatigue    If stools too loose substitute for your Magnesium oxide using;   Magnesium citrate 200 mg tabs(NOT liquid, NOT caps) amazon. com  Magnesium gluconate 550 mg by [facility] at [facility]  Magnesium chloride foot soaks or body sprays  www.[employer] Life   Magnesium chloride flakes for soaks, or magnesium spray or cream    Food Drink Symptom Log;  I asked this patient to track these items and any other symptoms on their list on a weekly basis to document their progress or lack of same.  This can be done on the symptom tracking sheet available to them at today's visit but looks like this:                                                      Rate on scale of 0-10 with zero = not noticeable  Symptom:                            Week 1               2                 3                 4               Etc            Hair loss    Foot cramps    Paresthesia    Aches    IBS (irritable bowel)    Constipation    Diarrhea  Nocturia    (up to bathroom at night)    Fatigue/Energy level    Stress      On the other side of the sheet they can track their food, drink, environment, activity, symptoms etc      Avoiding Latex-like proteins in my foods; Avocados, Bananas, Celery, Figs & Kiwi proteins have latex-like proteins to inflame our immune systems, see the 51 latex-like foods list  How Can I Have A Latex Allergy? Eating foods with latex-like protein exposes us to latex allergies. Our body cannot tell the difference between these latex-like proteins and latex from rubber products since many people are allergic to fruit, vegetables and latex. Read labels on pre-packaged foods. This list to avoid is only a guide if you are known allergic to latex or have a latex rash on your chin, cheeks and lines on your neck and chest. The amount of latex is different in each food product or fruit variety. Foods to Avoid out of Season if not grown locally: Melon, Nectarine, Papaya, Cherry, Passion fruit, Plum, Chestnuts, and Tomato. Avocado, Banana, Celery, Figs, and Kiwi always contain Latex-like protein and are almost universally toxic    Whats in Season? Strawberries taste better in June than December because June is strawberry season so buy locally grown produce \"in season\" for the best flavor, cost and less Latex. Locally grown produce not only tastes great requires little of no ethylene exposure in food distribution so has less latex content. Out of season, use canned, frozen or dried since processed ripe and are latex lower!!!   Month     [place] Locally Grown Produce  January, February, March: use canned, frozen or dried fruits since lower in latex  April; asparagus, radishes  May; asparagus, broccoli, green onions, greens, peas, radishes, rhubarb  June; asparagus, beets, beans, broccoli, cabbage, cantaloupe, carrots, green onions, greens, lettuce, onions, parsley, peas, radishes, rhubarb, strawberries, watermelons  July; beans, beets, blueberries, broccoli, cabbage, cantaloupe, carrots, cauliflower, celery, cucumbers, eggplant, grapes, green onions, greens, lettuce, onions, parsley, peas, peaches,

## 2018-09-27 NOTE — PATIENT INSTRUCTIONS
1. You may receive a survey about your visit with us today. The feedback from our patients helps us identify what is working well and where the service to all patients can be enhanced. Thank you! 2. Please bring in ALL medications BOTTLES, including inhalers, herbal supplements, over the counter, prescribed & non-prescribed medicine. The office would like actual medication bottles and a list.  3. Please note our OFFICE POLICIES:  a. Prior to getting your labs drawn, please check with your insurance company for benefits and eligibility of lab services. Often, insurance companies cover certain tests for preventative visits only. It is patient's responsibility to see what is covered. b. We are unable to change a diagnosis after the test has been performed. c. Lab orders will not be re-printed. Please hold onto your original lab orders and take them to your lab to be completed. d. If you no show your schedule appointment three times, you be dismissed from this practice. e. Linda Longs must be completed 24 hours prior to your schedule appointment. 4. If the list below has been completed, PLEASE FAX RECORDS TO OUR OFFICE @ [phone]. Once the records have been received we will update your records at our office. Health Maintenance Due   Topic Date Due    Hepatitis C screen  1950    DTaP/Tdap/Td vaccine (1 - Tdap) 08/04/1969    Shingles Vaccine (1 of 2 - 2 Dose Series) 08/04/2000    Colon cancer screen colonoscopy  08/04/2000    Pneumococcal low/med risk (1 of 2 - PCV13) 08/04/2015    Flu vaccine (1) 09/01/2018       Schedule your 2018 flu vaccine with Dr. Don Bell call [phone]!   [facility] [place], for anyone 9 years or older   [address]   Every Monday   8:00am-11:00am and 1:00pm-3:00pm

## 2018-10-13 PROBLEM — R05.9 COUGH: Status: RESOLVED | Noted: 2018-09-13 | Resolved: 2018-10-13

## 2018-12-20 ENCOUNTER — TELEPHONE (OUTPATIENT)
Dept: FAMILY MEDICINE CLINIC | Age: 68
End: 2018-12-20

## 2018-12-21 LAB
ABSOLUTE BASO #: 0.1 K/UL (ref 0–0.1)
ABSOLUTE EOS #: 0.2 K/UL (ref 0.1–0.4)
ABSOLUTE LYMPH #: 1.3 K/UL (ref 0.8–5.2)
ABSOLUTE MONO #: 0.4 K/UL (ref 0.1–0.9)
ABSOLUTE NEUT #: 2.9 K/UL (ref 1.3–9.1)
BASOPHILS RELATIVE PERCENT: 1.4 %
CALCIUM SERPL-MCNC: 9.9 MG/DL (ref 8.5–10.5)
CHOLESTEROL/HDL RATIO: 2.7
CHOLESTEROL: 246 MG/DL
EOSINOPHILS RELATIVE PERCENT: 4.4 %
HCT VFR BLD CALC: 42.5 % (ref 41.4–51)
HDLC SERPL-MCNC: 91.3 MG/DL
HEMOGLOBIN: 14.8 G/DL (ref 13.8–17)
HIGH SENSITIVE C-REACTIVE PROTEIN: 2.54 MG/L
LDL CHOLESTEROL CALCULATED: 140 MG/DL
LDL/HDL RATIO: 1.5
LYMPHOCYTE %: 27 %
MAGNESIUM: 2.1 MG/DL (ref 1.6–2.6)
MCH RBC QN AUTO: 30.7 PG (ref 27–34)
MCHC RBC AUTO-ENTMCNC: 34.8 G/DL (ref 31–36)
MCV RBC AUTO: 88.2 FL (ref 80–100)
MONOCYTES # BLD: 8.9 %
NEUTROPHILS RELATIVE PERCENT: 57.9 %
PDW BLD-RTO: 14 % (ref 10.8–14.8)
PLATELETS: 313 K/UL (ref 150–450)
RBC: 4.82 M/UL (ref 4–5.5)
SEDIMENTATION RATE, ERYTHROCYTE: 31 MM/HR (ref 0–20)
TRIGL SERPL-MCNC: 76 MG/DL
VLDLC SERPL CALC-MCNC: 15 MG/DL
WBC: 5 K/UL (ref 3.7–10.8)

## 2018-12-23 LAB — DEHYDROEPIANDROSTERONE: 4.9 NG/ML (ref 1.8–12.5)

## 2018-12-24 LAB
DHEAS (DHEA SULFATE): 288 UG/DL (ref 36–249)
GLIADIN ANTIBODIES IGA: 4.8 U/ML
GLIADIN ANTIBODIES IGG: 1.3 U/ML
HOMOCYSTINE, SERUM: 11 UMOL/L
PARATHYROID HORMONE INTACT: 66 PG/ML (ref 11–67)
SEX HORMONE BINDING GLOBULIN: 77.2 NMOL/L (ref 19.3–76.4)
TESTOSTERONE FREE, CALC: 41.8 PG/ML (ref 47–244)
TESTOSTERONE FREE: 380 NG/DL (ref 300–720)
VITAMIN D 25-HYDROXY: 34 NG/ML

## 2019-01-03 ENCOUNTER — OFFICE VISIT (OUTPATIENT)
Dept: FAMILY MEDICINE CLINIC | Age: 69
End: 2019-01-03
Payer: MEDICARE

## 2019-01-03 VITALS
RESPIRATION RATE: 10 BRPM | TEMPERATURE: 97.9 F | SYSTOLIC BLOOD PRESSURE: 142 MMHG | HEIGHT: 69 IN | DIASTOLIC BLOOD PRESSURE: 80 MMHG | BODY MASS INDEX: 31.96 KG/M2 | WEIGHT: 215.8 LBS

## 2019-01-03 DIAGNOSIS — K59.01 SLOW TRANSIT CONSTIPATION: ICD-10-CM

## 2019-01-03 DIAGNOSIS — K63.89 COLONIC MASS: ICD-10-CM

## 2019-01-03 DIAGNOSIS — R73.9 BLOOD GLUCOSE ELEVATED: ICD-10-CM

## 2019-01-03 DIAGNOSIS — R68.89 HEAT INTOLERANCE: ICD-10-CM

## 2019-01-03 DIAGNOSIS — R19.7 DIARRHEA DUE TO MALABSORPTION: ICD-10-CM

## 2019-01-03 DIAGNOSIS — R52 ACHES: ICD-10-CM

## 2019-01-03 DIAGNOSIS — R61 EXCESSIVE SWEATING: ICD-10-CM

## 2019-01-03 DIAGNOSIS — R20.2 NUMBNESS AND TINGLING IN BOTH HANDS: ICD-10-CM

## 2019-01-03 DIAGNOSIS — R00.2 PALPITATION: ICD-10-CM

## 2019-01-03 DIAGNOSIS — R06.7 SNEEZE: ICD-10-CM

## 2019-01-03 DIAGNOSIS — E78.5 ELEVATED LIPIDS: ICD-10-CM

## 2019-01-03 DIAGNOSIS — K90.9 DIARRHEA DUE TO MALABSORPTION: ICD-10-CM

## 2019-01-03 DIAGNOSIS — R42 LIGHTHEADED: ICD-10-CM

## 2019-01-03 DIAGNOSIS — R41.3 MEMORY DIFFICULTIES: ICD-10-CM

## 2019-01-03 DIAGNOSIS — T14.8XXA BRUISING: ICD-10-CM

## 2019-01-03 DIAGNOSIS — B35.3 TINEA PEDIS OF BOTH FEET: ICD-10-CM

## 2019-01-03 DIAGNOSIS — F41.9 ANXIETY: ICD-10-CM

## 2019-01-03 DIAGNOSIS — R20.0 NUMBNESS AND TINGLING IN BOTH HANDS: ICD-10-CM

## 2019-01-03 DIAGNOSIS — G47.9 SLEEP DIFFICULTIES: ICD-10-CM

## 2019-01-03 DIAGNOSIS — H53.9 VISION ABNORMALITIES: ICD-10-CM

## 2019-01-03 DIAGNOSIS — R63.5 WEIGHT GAIN: ICD-10-CM

## 2019-01-03 PROCEDURE — 3017F COLORECTAL CA SCREEN DOC REV: CPT | Performed by: FAMILY MEDICINE

## 2019-01-03 PROCEDURE — 1101F PT FALLS ASSESS-DOCD LE1/YR: CPT | Performed by: FAMILY MEDICINE

## 2019-01-03 PROCEDURE — 99214 OFFICE O/P EST MOD 30 MIN: CPT | Performed by: FAMILY MEDICINE

## 2019-01-03 PROCEDURE — 4040F PNEUMOC VAC/ADMIN/RCVD: CPT | Performed by: FAMILY MEDICINE

## 2019-01-03 PROCEDURE — 1123F ACP DISCUSS/DSCN MKR DOCD: CPT | Performed by: FAMILY MEDICINE

## 2019-01-03 PROCEDURE — G8427 DOCREV CUR MEDS BY ELIG CLIN: HCPCS | Performed by: FAMILY MEDICINE

## 2019-01-03 PROCEDURE — 1036F TOBACCO NON-USER: CPT | Performed by: FAMILY MEDICINE

## 2019-01-03 PROCEDURE — G8484 FLU IMMUNIZE NO ADMIN: HCPCS | Performed by: FAMILY MEDICINE

## 2019-01-03 PROCEDURE — G8417 CALC BMI ABV UP PARAM F/U: HCPCS | Performed by: FAMILY MEDICINE

## 2019-01-03 RX ORDER — MAGNESIUM GLUCONATE 27 MG(500)
1 TABLET ORAL
COMMUNITY
End: 2020-06-09

## 2019-01-03 ASSESSMENT — ENCOUNTER SYMPTOMS: DIARRHEA: 1

## 2019-04-17 LAB
ABSOLUTE BASO #: 0.1 K/UL (ref 0–0.1)
ABSOLUTE EOS #: 0.4 K/UL (ref 0.1–0.4)
ABSOLUTE LYMPH #: 1.5 K/UL (ref 0.8–5.2)
ABSOLUTE MONO #: 0.6 K/UL (ref 0.1–0.9)
ABSOLUTE NEUT #: 2.8 K/UL (ref 1.3–9.1)
AVERAGE GLUCOSE: 108 MG/DL (ref 66–114)
BASOPHILS RELATIVE PERCENT: 1.1 %
CALCIUM SERPL-MCNC: 9.4 MG/DL (ref 8.5–10.5)
CHOLESTEROL/HDL RATIO: 2.4 (ref 1–5)
CHOLESTEROL: 208 MG/DL (ref 150–200)
EOSINOPHILS RELATIVE PERCENT: 6.8 %
ESTRADIOL LEVEL: 25.6 PG/ML (ref 0–31.5)
HBA1C MFR BLD: 5.4 %
HCT VFR BLD CALC: 40.9 % (ref 41.4–51)
HDLC SERPL-MCNC: 88 MG/DL
HEMOGLOBIN: 14.4 G/DL (ref 13.8–17)
HIGH SENSITIVE C-REACTIVE PROTEIN: 0.21 MG/DL (ref 0–0.74)
LDL CHOLESTEROL CALCULATED: 106 MG/DL
LDL/HDL RATIO: 1.2
LYMPHOCYTE %: 28.9 %
MAGNESIUM: 2.1 MG/DL (ref 1.8–2.6)
MCH RBC QN AUTO: 30.9 PG (ref 27–34)
MCHC RBC AUTO-ENTMCNC: 35.2 G/DL (ref 31–36)
MCV RBC AUTO: 87.8 FL (ref 80–100)
MONOCYTES # BLD: 10.4 %
NEUTROPHILS RELATIVE PERCENT: 52.6 %
PDW BLD-RTO: 13.7 % (ref 10.8–14.8)
PLATELETS: 303 K/UL (ref 150–450)
RBC: 4.66 M/UL (ref 4–5.5)
SEDIMENTATION RATE, ERYTHROCYTE: 27 MM/HR (ref 0–20)
TRIGL SERPL-MCNC: 68 MG/DL (ref 27–150)
VLDLC SERPL CALC-MCNC: 14 MG/DL (ref 0–30)
WBC: 5.3 K/UL (ref 3.7–10.8)

## 2019-04-18 LAB
DHEAS (DHEA SULFATE): 363 UG/DL (ref 34–249)
GLIADIN ANTIBODIES IGA: 3.1 U/ML
GLIADIN ANTIBODIES IGG: 1.8 U/ML
HOMOCYSTINE, SERUM: 9 UMOL/L
PARATHYROID HORMONE INTACT: 54 PG/ML (ref 15–65)
VITAMIN D 25-HYDROXY: 36 NG/ML

## 2019-04-19 LAB — DEHYDROEPIANDROSTERONE: 3.6 NG/ML (ref 1.8–12.5)

## 2019-04-20 LAB — TESTOSTERONE FREE-MALE: 41.6 PG/ML (ref 47–244)

## 2019-04-23 ENCOUNTER — HOSPITAL ENCOUNTER (OUTPATIENT)
Dept: CT IMAGING | Age: 69
Discharge: HOME | End: 2019-04-23
Payer: MEDICARE

## 2019-04-23 DIAGNOSIS — C18.1 MALIGNANT NEOPLASM OF APPENDIX (HCC): ICD-10-CM

## 2019-04-23 LAB — POC CREATININE WHOLE BLOOD: 0.9 MG/DL (ref 0.5–1.2)

## 2019-04-23 PROCEDURE — 82565 ASSAY OF CREATININE: CPT

## 2019-04-23 PROCEDURE — 74177 CT ABD & PELVIS W/CONTRAST: CPT

## 2019-04-23 PROCEDURE — 6360000004 HC RX CONTRAST MEDICATION: Performed by: INTERNAL MEDICINE

## 2019-04-23 RX ADMIN — IOHEXOL 50 ML: 240 INJECTION, SOLUTION INTRATHECAL; INTRAVASCULAR; INTRAVENOUS; ORAL at 09:54

## 2019-04-23 RX ADMIN — IOPAMIDOL 85 ML: 755 INJECTION, SOLUTION INTRAVENOUS at 09:54

## 2019-05-09 ENCOUNTER — OFFICE VISIT (OUTPATIENT)
Dept: FAMILY MEDICINE CLINIC | Age: 69
End: 2019-05-09
Payer: MEDICARE

## 2019-05-09 ENCOUNTER — NURSE ONLY (OUTPATIENT)
Dept: LAB | Age: 69
End: 2019-05-09

## 2019-05-09 VITALS
OXYGEN SATURATION: 98 % | DIASTOLIC BLOOD PRESSURE: 82 MMHG | WEIGHT: 214.2 LBS | SYSTOLIC BLOOD PRESSURE: 138 MMHG | HEIGHT: 69 IN | RESPIRATION RATE: 10 BRPM | TEMPERATURE: 97.4 F | BODY MASS INDEX: 31.73 KG/M2 | HEART RATE: 86 BPM

## 2019-05-09 DIAGNOSIS — R73.9 BLOOD GLUCOSE ELEVATED: Primary | ICD-10-CM

## 2019-05-09 DIAGNOSIS — G47.9 SLEEP DIFFICULTIES: ICD-10-CM

## 2019-05-09 DIAGNOSIS — R61 EXCESSIVE SWEATING: ICD-10-CM

## 2019-05-09 DIAGNOSIS — R20.2 NUMBNESS AND TINGLING IN BOTH HANDS: ICD-10-CM

## 2019-05-09 DIAGNOSIS — R35.1 NOCTURIA: ICD-10-CM

## 2019-05-09 DIAGNOSIS — R00.2 PALPITATION: ICD-10-CM

## 2019-05-09 DIAGNOSIS — E78.5 ELEVATED LIPIDS: ICD-10-CM

## 2019-05-09 DIAGNOSIS — K63.89 COLONIC MASS: ICD-10-CM

## 2019-05-09 DIAGNOSIS — R20.0 NUMBNESS AND TINGLING IN BOTH HANDS: ICD-10-CM

## 2019-05-09 DIAGNOSIS — K90.9 DIARRHEA DUE TO MALABSORPTION: ICD-10-CM

## 2019-05-09 DIAGNOSIS — R41.3 MEMORY DIFFICULTIES: ICD-10-CM

## 2019-05-09 DIAGNOSIS — R19.7 DIARRHEA DUE TO MALABSORPTION: ICD-10-CM

## 2019-05-09 DIAGNOSIS — R42 LIGHTHEADED: ICD-10-CM

## 2019-05-09 DIAGNOSIS — T14.8XXA BRUISING: ICD-10-CM

## 2019-05-09 DIAGNOSIS — F41.9 ANXIETY: ICD-10-CM

## 2019-05-09 LAB
BACTERIA: ABNORMAL /HPF
BILIRUBIN URINE: NEGATIVE
BLOOD, URINE: NEGATIVE
CASTS 2: ABNORMAL /LPF
CASTS UA: ABNORMAL /LPF
CHARACTER, URINE: CLEAR
COLOR: ABNORMAL
CRYSTALS, UA: ABNORMAL
EPITHELIAL CELLS, UA: ABNORMAL /HPF
GLUCOSE URINE: NEGATIVE MG/DL
KETONES, URINE: NEGATIVE
LEUKOCYTE ESTERASE, URINE: NEGATIVE
MISCELLANEOUS 2: ABNORMAL
NITRITE, URINE: NEGATIVE
PH UA: 6 (ref 5–9)
PROTEIN UA: NEGATIVE
RBC URINE: ABNORMAL /HPF
RENAL EPITHELIAL, UA: ABNORMAL
SPECIFIC GRAVITY, URINE: 1.02 (ref 1–1.03)
UROBILINOGEN, URINE: 0.2 EU/DL (ref 0–1)
WBC UA: ABNORMAL /HPF
YEAST: ABNORMAL

## 2019-05-09 PROCEDURE — 99214 OFFICE O/P EST MOD 30 MIN: CPT | Performed by: FAMILY MEDICINE

## 2019-05-09 PROCEDURE — G8417 CALC BMI ABV UP PARAM F/U: HCPCS | Performed by: FAMILY MEDICINE

## 2019-05-09 PROCEDURE — 3017F COLORECTAL CA SCREEN DOC REV: CPT | Performed by: FAMILY MEDICINE

## 2019-05-09 PROCEDURE — 1123F ACP DISCUSS/DSCN MKR DOCD: CPT | Performed by: FAMILY MEDICINE

## 2019-05-09 PROCEDURE — G8427 DOCREV CUR MEDS BY ELIG CLIN: HCPCS | Performed by: FAMILY MEDICINE

## 2019-05-09 PROCEDURE — 4040F PNEUMOC VAC/ADMIN/RCVD: CPT | Performed by: FAMILY MEDICINE

## 2019-05-09 PROCEDURE — 1036F TOBACCO NON-USER: CPT | Performed by: FAMILY MEDICINE

## 2019-05-09 RX ORDER — BACITRACIN 500 UNIT/G
2 OINTMENT (GRAM) TOPICAL 2 TIMES DAILY
COMMUNITY
End: 2019-08-15

## 2019-05-09 RX ORDER — ANTIARTHRITIC COMBINATION NO.2 900 MG
1 TABLET ORAL
COMMUNITY
End: 2020-06-09

## 2019-05-09 ASSESSMENT — PATIENT HEALTH QUESTIONNAIRE - PHQ9
2. FEELING DOWN, DEPRESSED OR HOPELESS: 0
SUM OF ALL RESPONSES TO PHQ QUESTIONS 1-9: 0
SUM OF ALL RESPONSES TO PHQ9 QUESTIONS 1 & 2: 0
1. LITTLE INTEREST OR PLEASURE IN DOING THINGS: 0
SUM OF ALL RESPONSES TO PHQ QUESTIONS 1-9: 0

## 2019-05-09 NOTE — PATIENT INSTRUCTIONS
Thank you   1. Thank you for trusting us with your healthcare needs. You may receive a survey regarding today's visit. It would help us out if you would take a few moments to provide your feedback. We value your input. 2. Please bring in ALL medications BOTTLES, including inhalers, herbal supplements, over the counter, prescribed & non-prescribed medicine. The office would like actual medication bottles and a list.   3. Please note our OFFICE POLICIES:   a. Prior to getting your labs drawn, please check with your insurance company for benefits and eligibility of lab services. Often, insurance companies cover certain tests for preventative visits only. It is patient's responsibility to see what is covered. b. We are unable to change a diagnosis after the test has been performed. c. Lab orders will not be re-printed. Please hold onto your original lab orders and take them to your lab to be completed. d. If you no show your scheduled appointment three times, you will be dismissed from this practice. e. Winferd Speck must be completed 24 hours prior to your schedule appointment. 4. If the list below has been completed, PLEASE FAX RECORDS TO OUR OFFICE @ [phone].  Once the records have been received we will update your records at our office:  Health Maintenance Due   Topic Date Due    Hepatitis C screen  1950    DTaP/Tdap/Td vaccine (1 - Tdap) 08/04/1969    Shingles Vaccine (1 of 2) 08/04/2000    Colon cancer screen colonoscopy  08/04/2000    Pneumococcal 65+ years Vaccine (1 of 2 - PCV13) 08/04/2015

## 2019-05-09 NOTE — PROGRESS NOTES
[address] [facility]. [address] [facility]  Larry Marie 83  Dept: [phone]  Dept Fax: [phone]  Loc: [phone]      Sanjana Estrada is a 76 y.o. White male. Cal Bryant  presents to the [facility] for   Chief Complaint   Patient presents with    Follow-up     [address]   ,  and;   1. Blood glucose elevated    2. Elevated lipids    3. Anxiety    4. Memory difficulties    5. Sleep difficulties    6. Bruising    7. Numbness and tingling in both hands    8. Lightheaded    9. Diarrhea due to malabsorption    10. Palpitation    11. Excessive sweating    12. Colonic mass    13. Nocturia      I have reviewed Sanjana Estrada medical, surgical and other pertinent history in detail, and have updated medication and allergy information in the computerized patientrecord. Clinical Care Team:     -Referring Provider for today's consult: Self Referred  -Primary Care Provider: Jonas Mccabe MD    Medical/Surgical History:   He  has a past medical history of Arthritis and Vitamin D deficiency. His  has a past surgical history that includes Colonoscopy; Inguinal hernia repair; knee surgery; Dilatation, esophagus; and other surgical history (04/04/2017). Family/Social History:     His family history includes Cancer in his father; Heart Disease in his father. He  reports that he quit smoking about 17 years ago. His smoking use included cigarettes. He has a 25.00 pack-year smoking history. He has never used smokeless tobacco. He reports that he drinks alcohol. He reports that he does not use drugs.     Medications/Allergies/Immunizations:     His current medication(s) include   Current Outpatient Medications:     NONFORMULARY, Natures Sunshine- V8-C- 2 capsule tid, Disp: , Rfl:     Saw [place] 160 MG CAPS, Take 2 capsules by mouth 2 times daily, Disp: , Rfl:     DHEA 25 MG TABS, Place 1 tablet under the tongue every morning (before breakfast), Disp: , Rfl:     METHYLCOBALAMIN PO, Take 800 mg by mouth every morning (before breakfast), Disp: , Rfl:     magnesium gluconate (MAGONATE) 500 MG tablet, Take 1 tablet by mouth 3 times daily (with meals), Disp: , Rfl:     Omega-3 Fatty Acids (FISH OIL) 1000 MG CAPS, Take 3,000 mg by mouth 4 times daily (before meals and nightly) [facility]S pharmaceutical grade , Disp: , Rfl:     Magnesium Cl-Calcium Carbonate (SLOW MAGNESIUM/CALCIUM PO), Take 1 capsule by mouth 4 times daily (before meals and nightly) Or Magnesium Chloride by Now, Disp: , Rfl:     Cholecalciferol (VITAMIN D PO), Take 5,000 Int'l Units by mouth daily Double on Sundaysa, Disp: , Rfl:     B Complex-Biotin-FA (B COMPLEX 100 TR PO), Take 1 tablet by mouth 4 times daily (before meals and nightly) , Disp: , Rfl:     Cinnamon 500 MG CAPS, Take 2 capsules by mouth 4 times daily (before meals and nightly) , Disp: , Rfl:     Nutritional Supplements (DHEA PO), Take 25 mg by mouth every morning (before breakfast) , Disp: , Rfl:     Methylcobalamin 5000 MCG SUBL, Place 1 tablet under the tongue nightly, Disp: , Rfl:     Levomefolate Glucosamine (METHYLFOLATE PO), Take 1 capsule by mouth every morning (before breakfast) Metabolic Maintance 5 MTHF, Disp: , Rfl:     NONFORMULARY, CBD OIL- 1 dropper daily prn, Disp: , Rfl:     Red Yeast Rice Extract (RED YEAST RICE PO), Take 1 capsule by mouth 2 times daily Dinner and bedtime, Disp: , Rfl:   Allergies: Penicillins,  Immunizations: There is no immunization history on file for this patient. History of PresentIllness:     Abhilash's had concerns including Follow-up (4 MONTH) and Discuss Labs. Tana Mix  presents to the [address] today for;   Chief Complaint   Patient presents with    Follow-up     [address]   , ,  abnormal labs follow up and these conditions as he  Is looking today for:     1. Blood glucose elevated    2.  Elevated lipids 3. Anxiety    4. Memory difficulties    5. Sleep difficulties    6. Bruising    7. Numbness and tingling in both hands    8. Lightheaded    9. Diarrhea due to malabsorption    10. Palpitation    11. Excessive sweating    12. Colonic mass    13. Nocturia      HPI    Subjective:     Review of Systems   Genitourinary: Positive for frequency. Neurological: Positive for numbness. Psychiatric/Behavioral: Positive for sleep disturbance. All other systems reviewed and are negative. Objective:     /82 (Site: Left Upper Arm, Position: Sitting, Cuff Size: Medium Adult)   Pulse 86   Temp 97.4 °F (36.3 °C) (Oral)   Resp 10   Ht 5' 8.5\" (1.74 m)   Wt 214 lb 3.2 oz (97.2 kg)   SpO2 98%   BMI 32.09 kg/m²   Physical Exam   Constitutional: He is oriented to person, place, and time. He appears well-developed and well-nourished. HENT:   Head: Normocephalic. Pulmonary/Chest: Effort normal.   Neurological: He is alert and oriented to person, place, and time. Psychiatric: He has a normal mood and affect. Thought content normal.   Nursing note and vitals reviewed. Laboratory Data:   Lab results were searched in Care Everywhere and/or those brought by the pateint were reviewed today with Argenis Jenkins and he has a copy of their most recent labs to take home with them as notedbelow;       Imaging Data:   Imaging Data:       Assessment & Plan:       Impression:  1. Blood glucose elevated    2. Elevated lipids    3. Anxiety    4. Memory difficulties    5. Sleep difficulties    6. Bruising    7. Numbness and tingling in both hands    8. Lightheaded    9. Diarrhea due to malabsorption    10. Palpitation    11. Excessive sweating    12. Colonic mass    13.  Nocturia      Assessment and Plan:  After reviewing the patients chief complaints, reviewing their labfindings in great detail (with the patient and those accompanying them) which correlate to their chief complaints, symptoms, and or medical conditions; suggestions were made relating to changes in diet and or supplementswhich may improve the complaints and which will be reflected in their future lab findings; Chief Complaint   Patient presents with    Follow-up     [address]   ;    Plans for the next visits:  - Abnormal and non-optimal Labs were ordered today to be repeated in the next 120-365 days to assess changes from adjustments in nutrition and or nutrients. - Patient instructed when having ablood draw to ask the  to divide their lab draws into multiple draws over several days if not feeling good at the time of the lab draw or if either prefers to do several smaller blood draws over several days  -Patient instructed to check with insurer before each lab draw and to to to the lab which the insurer directs them for the most cost effective lab draw with the least patient's cost  - Lili Pierce  will be scheduled subsequentto those results. Lucas Rae will bring in his drink and food log to his next visit    Chronic Problems Addressed on this Visit:                                   1.  Intensity of Service; Uncontrolled items at this visit; Chief Complaint   Patient presents with    Follow-up     [address]   ; Improved items at this visit; Stable items atthis visit;  2. Patients food and drinks were reviewed with the patient,       - Lili Pierce will bring food+drink symptom log to next visit for inclusion in their record      - 75 better food list reviewed & given topatient with the omega 6 food list to avoid         - Gluten in corn and oats abstracts sheet reviewed and given to the patient today   3. Greater than 25 minutes were spent face to face on this visit of which >50% was for counseling and coordination of care.       Patients food and drinks were reviewed with thepatient,   - they will bring a food drink symptom log to future visits for inclusion in their record    - 75 better food list reviewed & given to patient along with the omega 6 food list to avoid      - Glutenin corn and oats abstracts sheet reviewed and given to the patient today    - 23 Foods containing Latex-like proteins was reviewed and copy to be taken if desired     - Nutrient Supplements list provided and copyto be taken if desired    - [employer] web site offered to patient to review at their convenience by staff with login information    Note:  I have discussed with the patient that with all nutraceuticals, there is often mixed data and emerging research which needs to be monitored; as well as an array of NIHfact sheets on nutrients and supplements. If I have recommended cinnamon at the request of this patient to assist them in control of their blood sugar, triglyceride and or weight issues. I discussed that thepatient's clinical use of cinnamon bark, calcium, magnesium, Vitamin D and pharmaceutical grade CVS #905452 fish oil or triple-strength fish oil, and B-75 two phase time-released B complex by Bessie Bowen will be for atime-limited trial to determine their individual effectiveness and safety in this patient. I also referred the patient to the NMCD: Nutrition, Metabolism, and Cardiovascular Diseases (journal) and concerns about long-termuse and hepatotoxicity of cinnamon and other nutrients and suggest they frequently search nih.gov for the latest non-proprietary information on nutriceuticals as well as consider a subscription to [employer] fordetails on reviewed supplements, or at the least review the nutrient files at [address] at [facility], [employer], an insulin mimetic, reduces some High Carbohydrate Dietary Impacts. Methylhydroxychalcone polymers insulin-enhancing properties in fat cells are responsible for enhanced glucose uptake, inhibiting hepatic HMG-CoA reductase and lowers lipids. www.jacn. org/content/20/4/327.full     But cinnamon with additivessuch as Chromium or Cinnamon Extract are not effective as insulin mimetics. https://www.silveira.net/     Nutrients for Start up from Archive or [employer] for ease to get started now ;  Emile Zamora has some useable products;  - Triple Strength Fish Oil, enteric coated  - Vit D 3 5000 IU gel caps  - Iron ferrous sulfat 325 mg tabs  - Centrum Silver look-a-like for most patients, or  - Centrum plain look-a-like if need iron    Localpharmacies or chains such as CVS, Walgreen, Wal-mart, have;  - Triple Strength Fish Oil (enteric coated ifavailable) or    If not enteric coated, can take from freezer for less burps  - B-50 or B-100 time released balanced B complex tabs  - Cinnamon bark 500 mg (without Chromium or extracts)   some brands list 1000 mg / serving of 2 capsules,    some brands have 1000 mg caps with the undesireable chromium / extract  - Calcium carbonate/citrate, magnesium oxide/citrate, Vit D 3  as 3-4 tabs/caps/serving     Some Local Brands may contain Zincwhich is acceptable for the first bottle or two  - Magnesium oxide 250 mg tabs for those having < 2 bowel movements daily  - Magnesium citrate 200 mg if having > 2bowel movement/day  - Centrum Silver or look-a-like for most patients, Centrum plain or look-a-like with iron  - Vitamin D-3 comes as 1,000 IU or 2,000 IU or 5,000 IU gel caps or Liquid drops      Some brands containing or derived from soy oil or corn oil are OK if not allergic to soy  - Elemental Iron 65 mg tabsat bedtime is available over the counter if need more iron     Usually turns bowel movements grey, green or black but not a concern  - Apricot Kernel Oil (by Now) for dry skin sensitive perineal or perianal area skin    Nutrients for ongoing use by Mail order for less expense from www. puritan.com ;  - Triple Strength Fish Oil , 240 Softgels Item [id number]  -B-100 time released balanced B complex Item #[id number]  - Cinnamon bark 500 mg without Chromium or extract Item #862849  - Calcium carbonate 1000 mg, Magnesium oxide 500 mg, Vit D 3  400 IU Item #[id number]  - Magnesium oxide 500 mg tabs Item #566865 if less than 2 bowel movements daily  - [employer] Seniors Item #[id number] for mostpatients, One Daily Item #292603 with iron  - Vit D 3  1,000 Item #174360      2,000 IU Item #[id number]  5,000 IU Item #471824     Some brands containing orderived from soy oil or corn oil are OK if not allergic to soy    Nutrients for Special Needs by Lucas Her for less expense from www. puritan.com ;  -Elemental Iron 65 mg tabs Item #777079 if need more iron for low iron on labs    Usually turns bowel movements grey, green or black but not a concern  - Time released Niacin 250 mg Item #062196 for cold intolerance, low libido or impotence  - DHEA 50 mg Item #131419 for improving DHEA levels on labs if having Fatigue    If stools too loose substitute for your Magnesium oxide using;   Magnesium citrate 200 mg tabs(NOT liquid) at [employer]   Magnesium gluconate 550 mgby Waqas at sofatutor com or amazon. com  Magnesium chloride foot soaks or body sprays  www.[employer]ota   Magnesium chloride flakes 14.99 Item #: [id number] if Backordered get spray    Food Drink Symptom Log;  I asked this patient to track these items and any other symptoms on their list on a weekly basis to documenttheir progress or lack of same.  This can be done on the symptom tracking sheet I gave them at today's visit but looks like this:                                                      Rate on scale of 0-10 with zero = notnoticeable  Symptom:                            Week 1               2                 3                 4               Etc            Hair loss    Foot cramps    Paresthesia    Aches    IBS (irritable bowel)    Constipation    Diarrhea  Nocturia    (up to bathroom at night)    Fatigue/Energy level  Stress      On the other side of the sheet they can track their food, drink, environment, activity, symptoms etc      Avoiding Latex-like proteins inmy foods; Avocados, Bananas, Celery, Figs & Kiwi proteins have latex-like proteins to inflame our immunesystems  How Can I Have A Latex Allergy? Eating foods with latex-like protein exposes us to latex allergies. Our body cannot tell the differencebetween these latex-like proteins and latex from rubber products since many people are allergic to fruit, vegetables and latex. Read labels on pre-packaged foods. This list to avoid is only a guide if you are known allergicto latex or have a latex rash on your chin, cheeks and lines on your neck and chest. The amount of latex is different in each food product or fruit variety. Foods to Avoid out of Season if not grown locally: Melon, Nectarine, Papaya, Cherry, Passion fruit, Plum, Chestnuts, and Tomato. Avocado, Banana, Celery, Figs, and Kiwi always contain Latex-like protein. Whats in Season? Strawberries taste better in June than December because June is strawberry season so buy locally grown produce \"in season\" for the best flavor, cost and less Latex. Locally grown produce notonly tastes great requires little of no ethylene exposure in food distribution so has less latex content. Out of season, use canned, frozen or dried sinceprocessed ripe and are latex lower!!!   Month     [place] LocallyGrown Produce  January, February, March: use canned, frozen or dried fruits since lower in latex  April; asparagus, radishes  May; asparagus, broccoli, green onions, greens, peas, radishes,rhubarb  June; asparagus, beets, beans, broccoli, cabbage, cantaloupe, carrots, green onions, greens, lettuce,onions, parsley, peas, radishes, rhubarb, strawberries, watermelons  July; beans, beets, blueberries,broccoli, cabbage, cantaloupe, carrots, cauliflower, celery, cucumbers, eggplant, grapes, green onions, greens, lettuce, onions, parsley, peas, peaches, bell peppers, potatoes, radishes, summer raspberries, squash, sweetcorn, tomatoes, turnips, watermelons  August; apples, beans, beets, blueberries, cabbage, cantaloupe, carrots,cauliflower, celery, cucumbers, eggplant, grapes, green onions, greens, lettuce, onions, parsley, peas, peaches, pears, bell peppers, potatoes, radishes, squash, sweet corn, tomatoes, turnips, watermelons  September; apples, beans, beets, blueberries, cabbage, cantaloupe, carrots, cauliflower, celery, cucumbers, eggplant, grapes,green onions, greens, lettuce, onions, parsley, peas, peaches, pears, bell peppers, plums, potatoes, pumpkins, radishes, fall red raspberries, squash, sweet corn, tomatoes, turnips, watermelons  October; apples, beets, broccoli, cabbage, carrots, cauliflower, celery, green onions, greens, lettuce, parsley, peas, pears, potatoes,pumpkins, radishes, fall red raspberries, squash, turnips  November; broccoli, cabbage, carrots, parsley,pears, peas  December: use canned, frozen ordried fruits since lower in latex    Upto half of latex-sensitive patients show allergic reactions to fruits (avocados, bananas, kiwifruits, papayas, peaches),   Annals of Allergy, 1994. These plants contain the same proteins that are allergens in latex. People with fruit allergies should warn physicians beforeundergoing procedures which may cause anaphylactic reaction if in contact with latex gloves. Some of the common foods with defined cross-reactivity to latexare avocado, banana, kiwi, chestnut, raw potato, tomato,stone fruits (e.g., peach, cherry), hazelnut, melons, celery, carrot, apple, pear, papaya, and almond. Foods with less well-defined cross-reactivity to latex are peanuts, peppers, citrus fruits, coconut, pineapple, chivo,fig, passion fruit, Ugli fruit, and grape    This fruit/latex cross-reactivity is worsened by ethylene, a gas used to hasten commercial ripening. In nature, plants produce low levels of the hormone ethylene, which regulates germination, flowering, and ripening.  Forced ripening by high ethyleneconcentrations, plants produce allergenic wound-repair proteins, which are similar to wound-repair proteins made during the tapping of rubber trees. Sensitive individualswho ingest the fruit get a higher dose and worse reaction. Some people may even first become sensitized to latex through fruit. Can food processing increase theconcentrations of allergenic proteins? Latex-sensitized children (and adults) in [place] often experience allergic reactions after eating bananas ripenedartificially with ethylene. In the [place], food distribution centers treat unripe bananas and other produce with ethylene to ripen; not commonly done in [facility] since fruit is tree-ripened there. Does treatmentof food with ethylene induce banana proteins that cross-react with latex? (Alina et al.    References:   Latex in Foods Allergy, http://ehp.niehs.nih.gov/members/2003/5811/5811.html    Search web for \" Whats in Season \" for whereyou live or are at the time you food shop  www.nutritioncouncil.org/pdf/healthy/SeasonalProduce. pdf ,   Management of Latex, http://medicalcenter. osu.edu/  search for latex

## 2019-05-17 LAB — PSA, ULTRASENSITIVE: 0.92 NG/ML (ref 0–4)

## 2019-05-18 ENCOUNTER — PATIENT MESSAGE (OUTPATIENT)
Dept: FAMILY MEDICINE CLINIC | Age: 69
End: 2019-05-18

## 2019-05-18 DIAGNOSIS — R35.1 NOCTURIA: Primary | ICD-10-CM

## 2019-05-20 DIAGNOSIS — R35.0 FREQUENCY OF MICTURITION: Primary | ICD-10-CM

## 2019-05-20 NOTE — TELEPHONE ENCOUNTER
Called pt, said he already got it done at [facility] this morning. Felipe Trinidad he wants a referral to Dr. Ruby Solorio.   Placed referral.

## 2019-05-21 LAB
APPEARANCE: CLEAR
BACTERIA: ABNORMAL PER HPF
BILIRUBIN: NEGATIVE
COLOR: NORMAL
EPITHELIAL CELLS: ABNORMAL PER HPF
GLUCOSE BLD-MCNC: NEGATIVE MG/DL
HYALINE CASTS: ABNORMAL
KETONES, URINE: NEGATIVE
LEUKOCYTE ESTERASE, URINE: NEGATIVE
MUCUS: ABNORMAL
NITRITE, URINE: NEGATIVE
OCCULT BLOOD,URINE: NEGATIVE
PH: 6 (ref 5–9)
PROTEIN, URINE: NEGATIVE
RBC: 0 PER HPF (ref 0–5)
SP GRAVITY MISCELLANEOUS: 1.02 (ref 1–1.03)
UROBILINOGEN, URINE: NORMAL
WBC: ABNORMAL PER HPF (ref 0–5)

## 2019-05-28 ENCOUNTER — OFFICE VISIT (OUTPATIENT)
Dept: UROLOGY | Age: 69
End: 2019-05-28
Payer: MEDICARE

## 2019-05-28 VITALS
DIASTOLIC BLOOD PRESSURE: 92 MMHG | BODY MASS INDEX: 30.78 KG/M2 | HEIGHT: 70 IN | WEIGHT: 215 LBS | SYSTOLIC BLOOD PRESSURE: 138 MMHG

## 2019-05-28 DIAGNOSIS — N52.9 ERECTILE DYSFUNCTION, UNSPECIFIED ERECTILE DYSFUNCTION TYPE: ICD-10-CM

## 2019-05-28 DIAGNOSIS — R33.9 INCOMPLETE EMPTYING OF BLADDER: Primary | ICD-10-CM

## 2019-05-28 DIAGNOSIS — R35.1 NOCTURIA: ICD-10-CM

## 2019-05-28 LAB
BILIRUBIN URINE: NEGATIVE
BLOOD URINE, POC: NEGATIVE
CHARACTER, URINE: CLEAR
COLOR, URINE: YELLOW
GLUCOSE URINE: NEGATIVE MG/DL
KETONES, URINE: ABNORMAL
LEUKOCYTE CLUMPS, URINE: NEGATIVE
NITRITE, URINE: NEGATIVE
PH, URINE: 6 (ref 5–9)
POST VOID RESIDUAL (PVR): 89 ML
PROTEIN, URINE: NEGATIVE MG/DL
SPECIFIC GRAVITY, URINE: 1.02 (ref 1–1.03)
UROBILINOGEN, URINE: 0.2 EU/DL (ref 0–1)

## 2019-05-28 PROCEDURE — 51798 US URINE CAPACITY MEASURE: CPT | Performed by: NURSE PRACTITIONER

## 2019-05-28 PROCEDURE — 1036F TOBACCO NON-USER: CPT | Performed by: NURSE PRACTITIONER

## 2019-05-28 PROCEDURE — G8417 CALC BMI ABV UP PARAM F/U: HCPCS | Performed by: NURSE PRACTITIONER

## 2019-05-28 PROCEDURE — 4040F PNEUMOC VAC/ADMIN/RCVD: CPT | Performed by: NURSE PRACTITIONER

## 2019-05-28 PROCEDURE — 3017F COLORECTAL CA SCREEN DOC REV: CPT | Performed by: NURSE PRACTITIONER

## 2019-05-28 PROCEDURE — 99204 OFFICE O/P NEW MOD 45 MIN: CPT | Performed by: NURSE PRACTITIONER

## 2019-05-28 PROCEDURE — 1123F ACP DISCUSS/DSCN MKR DOCD: CPT | Performed by: NURSE PRACTITIONER

## 2019-05-28 PROCEDURE — 81003 URINALYSIS AUTO W/O SCOPE: CPT | Performed by: NURSE PRACTITIONER

## 2019-05-28 PROCEDURE — G8427 DOCREV CUR MEDS BY ELIG CLIN: HCPCS | Performed by: NURSE PRACTITIONER

## 2019-05-28 RX ORDER — SILDENAFIL CITRATE 20 MG/1
TABLET ORAL
Qty: 30 TABLET | Refills: 3 | Status: SHIPPED | OUTPATIENT
Start: 2019-05-28 | End: 2019-05-28 | Stop reason: SDUPTHER

## 2019-05-28 RX ORDER — SILDENAFIL CITRATE 20 MG/1
TABLET ORAL
Qty: 30 TABLET | Refills: 3 | Status: SHIPPED | OUTPATIENT
Start: 2019-05-28 | End: 2019-07-01 | Stop reason: ALTCHOICE

## 2019-05-28 RX ORDER — TAMSULOSIN HYDROCHLORIDE 0.4 MG/1
0.4 CAPSULE ORAL NIGHTLY
Qty: 30 CAPSULE | Refills: 3 | Status: SHIPPED | OUTPATIENT
Start: 2019-05-28 | End: 2019-07-01

## 2019-05-28 NOTE — PROGRESS NOTES
[address] [place]  Dept: [phone]  Loc: [phone]  Visit Date: 5/28/2019      HPI:     Oma Zeng is a 76 y.o. with past medical history of Vitamin D deficiency, OA who presents today for nocturia. Lili Pierce reports nocturia, frequency, urgency, and trouble emptying his bladder for the past couple of years. He also complains of trouble with his erections. Hasn't been able to have intercourse for a couple of years now. He has an I-PSS of 23. Has a DAYSI score of 0. No history of Urological surgery. Patient reports normal LOULOU and PSA per PCP. He denies pain, fever, chills, or gross hematuria. No troubles with recurrent UTI. His urine today was negative for signs of infection. PVR was 89 ml. Oma Zeng comes in with family. History is obtained from patient and medical record.       Current Outpatient Medications   Medication Sig Dispense Refill    tamsulosin (FLOMAX) 0.4 MG capsule Take 1 capsule by mouth nightly 30 capsule 3    sildenafil (REVATIO) 20 MG tablet Take 1 - 5 tablets as needed for ED (not more than 1 dose per 24 hours) 30 tablet 3    NONFORMULARY Natures Sunshine- V8-C- 2 capsule tid      Saw Palmetto 160 MG CAPS Take 2 capsules by mouth 2 times daily      DHEA 25 MG TABS Place 1 tablet under the tongue every morning (before breakfast)      METHYLCOBALAMIN PO Take 800 mg by mouth every morning (before breakfast)      magnesium gluconate (MAGONATE) 500 MG tablet Take 1 tablet by mouth 3 times daily (with meals)      Omega-3 Fatty Acids (FISH OIL) 1000 MG CAPS Take 3,000 mg by mouth 4 times daily (before meals and nightly) [employer] pharmaceutical grade       Magnesium Cl-Calcium Carbonate (SLOW MAGNESIUM/CALCIUM PO) Take 1 capsule by mouth 4 times daily (before meals and nightly) Or Magnesium Chloride by Now      Cholecalciferol (VITAMIN D PO) Take 5,000 Int'l Units by mouth daily Double on Sundaysa      B Complex-Biotin-FA (B COMPLEX 100 TR PO) Take 1 tablet by mouth 4 times daily (before meals and nightly)       Cinnamon 500 MG CAPS Take 2 capsules by mouth 4 times daily (before meals and nightly)       Nutritional Supplements (DHEA PO) Take 25 mg by mouth every morning (before breakfast)       Methylcobalamin 5000 MCG SUBL Place 1 tablet under the tongue nightly      Levomefolate Glucosamine (METHYLFOLATE PO) Take 1 capsule by mouth every morning (before breakfast) Metabolic Maintance 5 MTHF      NONFORMULARY CBD OIL- 1 dropper daily prn      Red Yeast Rice Extract (RED YEAST RICE PO) Take 1 capsule by mouth 2 times daily Dinner and bedtime       No current facility-administered medications for this visit. Past Medical History  Tisha Garcia  has a past medical history of Arthritis and Vitamin D deficiency. Past Surgical History  The patient  has a past surgical history that includes Colonoscopy; Inguinal hernia repair; knee surgery; Dilatation, esophagus; and other surgical history (04/04/2017). Family History  This patient's family history includes Cancer in his father; Heart Disease in his father. Social History  Tisha Garcia  reports that he quit smoking about 17 years ago. His smoking use included cigarettes. He has a 25.00 pack-year smoking history. He has never used smokeless tobacco. He reports that he drinks alcohol. He reports that he does not use drugs. Subjective:     Review of Systems  No problems with ears, nose or throat. No problems with eyes. No chest pain, shortness of breath, abdominal pain, extremity pain or weakness, and no neurological deficits. No rashes.  symptoms per HPI. The remainder of the review of symptoms is negative.     Objective:     PE:   Vitals:    05/28/19 1534 05/28/19 1541   BP: (!) 130/90 (!) 138/92   Weight: 215 lb (97.5 kg)    Height: 5' 10\" (1.778 m)        Constitutional: Alert and oriented times 3, no acute distress and cooperative to examination with appropriate mood and affect. HENT:   Head:        Normocephalic and atraumatic. Mouth/Throat:         Mucous membranes are normal.   Eyes:         EOM are normal. No scleral icterus. PERRLA. Neck:        Supple, symmetrical, trachea midline  Cardiovascular:        Normal rate, regular rhythm, S1 S2 heart sounds. No murmurs, rub, or gallops. Pulses:       Radial pulses are 2+/4 bilateral and equal. Posterior tibialis 2+/4 bilateral and equal  Pulmonary/Chest:      Chest symmetric with normal A/P diameter,  CTA with no wheezes, rales, or rhonchi noted. Normal respiratory rate and rhthym. No use of accessory muscles. Abdominal:         Soft. No tenderness. Bowel sounds present. Musculoskeletal:         Normal range of motion. No edema or tenderness of lower extremities. Extremities: No cyanosis, clubbing, or edema present. Neurological:        Alert and oriented. No cranial nerve deficit. [facility]:        Normal mood and affect.       Labs   Urine dip demonstrates   Results for POC orders placed in visit on 05/28/19   POCT Urinalysis No Micro (Auto)   Result Value Ref Range    Glucose, Ur Negative NEGATIVE mg/dl    Bilirubin Urine Negative     Ketones, Urine Trace (A) NEGATIVE    Specific Gravity, Urine 1.025 1.002 - 1.03    Blood, UA POC Negative NEGATIVE    pH, Urine 6.00 5.0 - 9.0    Protein, Urine Negative NEGATIVE mg/dl    Urobilinogen, Urine 0.20 0.0 - 1.0 eu/dl    Nitrite, Urine Negative NEGATIVE    Leukocyte Clumps, Urine Negative NEGATIVE    Color, Urine Yellow YELLOW-STR    Character, Urine Clear CLR-SL.[email or web address]   poct post void residual   Result Value Ref Range    post void residual 89 ml       Patients recent PSA values are as follows  No results found for: PSA, PSADIA     Recent BUN/Creatinine:  Lab Results   Component Value Date    BUN 17 09/13/2018    CREATININE 1.0 09/13/2018       Radiology  The patient has had a CT Without and With Contrast which I have reviewed along with its accompanying report. The study demonstrates:    Impression   1. Constipation. Cholelithiasis. Mildly aneurysmal distal descending thoracic aorta. 2. Inguinal hernia right side and contains only adipose tissue. 3. No evidence for residual or recurrent malignancy.               **This report has been created using voice recognition software.  It may contain minor errors which are inherent in voice recognition technology. **       Final report electronically signed by Dr. Lali Jeffers on 4/23/2019 10:45 AM            Assessment & Plan:     BPH with LUTS  Nocturia  Erectile Dysfunction    Urine today was negative for signs of infection. PVR was 89 ml.  C/o of frequency, urgency,intermittency, and trouble emptying his bladder. Will start Flomax. Also c/o trouble getting an erection. No heart history, doesn't see a Cardiologist, not on nay cardiac meds. Will trial some Revatio. RTO in 4 weeks for symptom recheck with PVR. Encouraged him to call with any questions or concerns. Return in about 1 month (around 6/25/2019) for BPH with LUTS, nocturia, ED.     CICI Hussein-CNP  Urology

## 2019-07-01 ENCOUNTER — OFFICE VISIT (OUTPATIENT)
Dept: UROLOGY | Age: 69
End: 2019-07-01
Payer: MEDICARE

## 2019-07-01 VITALS
SYSTOLIC BLOOD PRESSURE: 162 MMHG | HEIGHT: 70 IN | BODY MASS INDEX: 30.91 KG/M2 | DIASTOLIC BLOOD PRESSURE: 100 MMHG | WEIGHT: 215.9 LBS

## 2019-07-01 DIAGNOSIS — R35.1 NOCTURIA: Primary | ICD-10-CM

## 2019-07-01 DIAGNOSIS — R33.9 INCOMPLETE EMPTYING OF BLADDER: ICD-10-CM

## 2019-07-01 DIAGNOSIS — N52.9 ERECTILE DYSFUNCTION, UNSPECIFIED ERECTILE DYSFUNCTION TYPE: ICD-10-CM

## 2019-07-01 LAB — POST VOID RESIDUAL (PVR): 120 ML

## 2019-07-01 PROCEDURE — G8417 CALC BMI ABV UP PARAM F/U: HCPCS | Performed by: NURSE PRACTITIONER

## 2019-07-01 PROCEDURE — 4040F PNEUMOC VAC/ADMIN/RCVD: CPT | Performed by: NURSE PRACTITIONER

## 2019-07-01 PROCEDURE — G8428 CUR MEDS NOT DOCUMENT: HCPCS | Performed by: NURSE PRACTITIONER

## 2019-07-01 PROCEDURE — 1036F TOBACCO NON-USER: CPT | Performed by: NURSE PRACTITIONER

## 2019-07-01 PROCEDURE — 1123F ACP DISCUSS/DSCN MKR DOCD: CPT | Performed by: NURSE PRACTITIONER

## 2019-07-01 PROCEDURE — 99213 OFFICE O/P EST LOW 20 MIN: CPT | Performed by: NURSE PRACTITIONER

## 2019-07-01 PROCEDURE — 3017F COLORECTAL CA SCREEN DOC REV: CPT | Performed by: NURSE PRACTITIONER

## 2019-07-01 PROCEDURE — 51798 US URINE CAPACITY MEASURE: CPT | Performed by: NURSE PRACTITIONER

## 2019-07-01 RX ORDER — TAMSULOSIN HYDROCHLORIDE 0.4 MG/1
0.8 CAPSULE ORAL NIGHTLY
Qty: 180 CAPSULE | Refills: 3 | Status: SHIPPED | OUTPATIENT
Start: 2019-07-01 | End: 2019-09-16 | Stop reason: ALTCHOICE

## 2019-07-01 RX ORDER — TADALAFIL 20 MG/1
20 TABLET ORAL
Qty: 10 TABLET | Refills: 5 | Status: SHIPPED | OUTPATIENT
Start: 2019-07-01 | End: 2019-11-15 | Stop reason: ALTCHOICE

## 2019-07-01 NOTE — PROGRESS NOTES
Vitals:    07/01/19 1426 07/01/19 1441   BP: (!) 154/92 (!) 162/100   Weight: 215 lb 14.4 oz (97.9 kg)    Height: 5' 10\" (1.778 m)        Constitutional: Alert and oriented times 3, no acute distress and cooperative to examination with appropriate mood and affect. HENT:   Head:        Normocephalic and atraumatic. Mouth/Throat:         Mucous membranes are normal.   Eyes:         EOM are normal. No scleral icterus. PERRLA. Neck:        Supple, symmetrical, trachea midline  Cardiovascular:        Normal rate, regular rhythm, S1 S2 heart sounds. No murmurs, rub, or gallops. Pulses:       Radial pulses are 2+/4 bilateral and equal. Posterior tibialis 2+/4 bilateral and equal  Pulmonary/Chest:      Chest symmetric with normal A/P diameter,  CTA with no wheezes, rales, or rhonchi noted. Normal respiratory rate and rhthym. No use of accessory muscles. Abdominal:         Soft. No tenderness. Bowel sounds present. Musculoskeletal:         Normal range of motion. No edema or tenderness of lower extremities. Extremities: No cyanosis, clubbing, or edema present. Neurological:        Alert and oriented. No cranial nerve deficit. [facility]:        Normal mood and affect. Labs   Urine dip demonstrates   Results for POC orders placed in visit on 07/01/19   poct post void residual   Result Value Ref Range    post void residual 120 ml        Recent BUN/Creatinine:  Lab Results   Component Value Date    BUN 17 09/13/2018    CREATININE 1.0 09/13/2018          Assessment & Plan:     BPH with LUTS  Nocturia  Erectile Dysfunction    Was unable to void today in the office as he had just gone before leaving home. Bladder scan revealed 120 ml. Says the Flomax has helped significantly, however, he is still getting up about 4x night. Will increase his Flomax to 0.8 mg at nighttime. Revatio helped minimally with his erections. Will stop this and start Tadalafil 20 mg every 72 hours prn for ED.   Sent this

## 2019-08-07 LAB
ABSOLUTE BASO #: 0 X10E9/L (ref 0–0.9)
ABSOLUTE EOS #: 0.3 X10E9/L (ref 0–0.4)
ABSOLUTE LYMPH #: 1.2 X10E9/L (ref 1–3.5)
ABSOLUTE MONO #: 0.4 X10E9/L (ref 0–0.9)
ABSOLUTE NEUT #: 2.5 X10E9/L (ref 1.5–6.6)
AVERAGE GLUCOSE: 105 MG/DL
BASOPHILS RELATIVE PERCENT: 0.8 %
CALCIUM SERPL-MCNC: 9.4 MG/DL (ref 8.5–10.5)
CHOLESTEROL/HDL RATIO: 2.8 (ref 1–5)
CHOLESTEROL: 200 MG/DL (ref 150–200)
EOSINOPHILS RELATIVE PERCENT: 6.2 %
HBA1C MFR BLD: 5.3 % (ref 4.4–6.4)
HCT VFR BLD CALC: 41.3 % (ref 37–51)
HDLC SERPL-MCNC: 71 MG/DL
HEMOGLOBIN: 14.3 G/DL (ref 12.6–17.4)
HIGH SENSITIVE C-REACTIVE PROTEIN: 0.15 MG/DL (ref 0–0.74)
LDL CHOLESTEROL CALCULATED: 119 MG/DL
LDL/HDL RATIO: 1.7
LYMPHOCYTE %: 26.9 %
MAGNESIUM: 2 MG/DL (ref 1.8–2.6)
MCH RBC QN AUTO: 32 PG (ref 27–35)
MCHC RBC AUTO-ENTMCNC: 34.6 G/DL (ref 31–36)
MCV RBC AUTO: 93 FL (ref 81–101)
MONOCYTES # BLD: 8.6 %
NEUTROPHILS RELATIVE PERCENT: 57.5 %
PDW BLD-RTO: 14.2 % (ref 11.4–14.3)
PLATELETS: 270 X10E9/L (ref 150–450)
PMV BLD AUTO: 9.6 FL (ref 7–12)
RBC: 4.47 X10E12/L (ref 3.9–5.8)
SEDIMENTATION RATE, ERYTHROCYTE: 27 MM/H (ref 0–20)
T3 TOTAL: 98 NG/DL (ref 87–178)
T4 TOTAL: 7.9 UG/DL (ref 6.1–12.2)
TESTOSTERONE TOTAL: 3.47 NG/ML (ref 1.68–7.46)
TRIGL SERPL-MCNC: 50 MG/DL (ref 27–150)
TSH SERPL DL<=0.05 MIU/L-ACNC: 2.82 UIU/ML (ref 0.49–4.67)
VLDLC SERPL CALC-MCNC: 10 MG/DL (ref 0–30)
WBC: 4.3 X10E9/L (ref 4.4–12)

## 2019-08-08 LAB
DHEAS (DHEA SULFATE): 117 UG/DL (ref 34–249)
GLIADIN ANTIBODIES IGG: 1.5 U/ML
HOMOCYSTINE, SERUM: 11 UMOL/L
PARATHYROID HORMONE INTACT: 42 PG/ML (ref 15–65)
THYROID PEROXIDASE ANTIBODY: 1 IU/ML
VITAMIN D 25-HYDROXY: 43 NG/ML

## 2019-08-10 LAB — DEHYDROEPIANDROSTERONE: 2.3 NG/ML (ref 1.8–12.5)

## 2019-08-15 ENCOUNTER — OFFICE VISIT (OUTPATIENT)
Dept: FAMILY MEDICINE CLINIC | Age: 69
End: 2019-08-15
Payer: MEDICARE

## 2019-08-15 VITALS
OXYGEN SATURATION: 98 % | HEART RATE: 98 BPM | TEMPERATURE: 98.4 F | WEIGHT: 214.4 LBS | DIASTOLIC BLOOD PRESSURE: 88 MMHG | HEIGHT: 70 IN | RESPIRATION RATE: 10 BRPM | SYSTOLIC BLOOD PRESSURE: 140 MMHG | BODY MASS INDEX: 30.69 KG/M2

## 2019-08-15 DIAGNOSIS — G47.9 SLEEP DIFFICULTIES: ICD-10-CM

## 2019-08-15 DIAGNOSIS — R20.0 NUMBNESS AND TINGLING IN BOTH HANDS: ICD-10-CM

## 2019-08-15 DIAGNOSIS — K90.9 DIARRHEA DUE TO MALABSORPTION: ICD-10-CM

## 2019-08-15 DIAGNOSIS — F41.9 ANXIETY: ICD-10-CM

## 2019-08-15 DIAGNOSIS — R00.2 PALPITATION: ICD-10-CM

## 2019-08-15 DIAGNOSIS — R61 EXCESSIVE SWEATING: ICD-10-CM

## 2019-08-15 DIAGNOSIS — R41.3 MEMORY DIFFICULTIES: ICD-10-CM

## 2019-08-15 DIAGNOSIS — T14.8XXA BRUISING: ICD-10-CM

## 2019-08-15 DIAGNOSIS — R35.0 FREQUENCY OF MICTURITION: Primary | ICD-10-CM

## 2019-08-15 DIAGNOSIS — K59.01 SLOW TRANSIT CONSTIPATION: ICD-10-CM

## 2019-08-15 DIAGNOSIS — R52 ACHES: ICD-10-CM

## 2019-08-15 DIAGNOSIS — E78.5 ELEVATED LIPIDS: ICD-10-CM

## 2019-08-15 DIAGNOSIS — R20.2 NUMBNESS AND TINGLING IN BOTH HANDS: ICD-10-CM

## 2019-08-15 DIAGNOSIS — R35.1 NOCTURIA: ICD-10-CM

## 2019-08-15 DIAGNOSIS — R42 LIGHTHEADED: ICD-10-CM

## 2019-08-15 DIAGNOSIS — R19.7 DIARRHEA DUE TO MALABSORPTION: ICD-10-CM

## 2019-08-15 DIAGNOSIS — R73.9 BLOOD GLUCOSE ELEVATED: ICD-10-CM

## 2019-08-15 PROCEDURE — 1123F ACP DISCUSS/DSCN MKR DOCD: CPT | Performed by: FAMILY MEDICINE

## 2019-08-15 PROCEDURE — G8427 DOCREV CUR MEDS BY ELIG CLIN: HCPCS | Performed by: FAMILY MEDICINE

## 2019-08-15 PROCEDURE — 3017F COLORECTAL CA SCREEN DOC REV: CPT | Performed by: FAMILY MEDICINE

## 2019-08-15 PROCEDURE — 99214 OFFICE O/P EST MOD 30 MIN: CPT | Performed by: FAMILY MEDICINE

## 2019-08-15 PROCEDURE — G8417 CALC BMI ABV UP PARAM F/U: HCPCS | Performed by: FAMILY MEDICINE

## 2019-08-15 PROCEDURE — 1036F TOBACCO NON-USER: CPT | Performed by: FAMILY MEDICINE

## 2019-08-15 PROCEDURE — 4040F PNEUMOC VAC/ADMIN/RCVD: CPT | Performed by: FAMILY MEDICINE

## 2019-08-15 ASSESSMENT — ENCOUNTER SYMPTOMS
SINUS PAIN: 1
COLOR CHANGE: 1
CONSTIPATION: 1
SINUS PRESSURE: 1
BACK PAIN: 1
SHORTNESS OF BREATH: 1
COUGH: 1

## 2019-08-15 NOTE — PROGRESS NOTES
[address] [facility]. [address] [address]  Dept: [phone]  Dept Fax: [phone]  Loc: [phone]      Luis Cheney is a 71 y.o. White male. Lebron Rowley  presents to the [facility] today for   Chief Complaint   Patient presents with    3 Month Follow-Up    Nasal Congestion     doesn't feel great   ,  and;   1. Frequency of micturition    2. Nocturia    3. Blood glucose elevated    4. Elevated lipids    5. Anxiety    6. Memory difficulties    7. Sleep difficulties    8. Bruising    9. Numbness and tingling in both hands    10. Lightheaded    11. Diarrhea due to malabsorption    12. Palpitation    13. Excessive sweating    14. Slow transit constipation    15. Aches      I have reviewed Luis Cheney medical, surgical and other pertinent history in detail, and have updated medication and allergy information in the computerized patientrecord. Clinical Care Team:     -Referring Provider for today's consult: Self Referred  -Primary Care Provider: Gris Clarke MD    Medical/Surgical History:   He  has a past medical history of Arthritis and Vitamin D deficiency. His  has a past surgical history that includes Colonoscopy; Inguinal hernia repair; knee surgery; Dilatation, esophagus; and other surgical history (04/04/2017). Family/Social History:     His family history includes Cancer in his father; Heart Disease in his father. He  reports that he quit smoking about 17 years ago. His smoking use included cigarettes. He has a 25.00 pack-year smoking history. He has never used smokeless tobacco. He reports that he drinks alcohol. He reports that he does not use drugs.     Medications/Allergies/Immunizations:     His current medication(s) include   Current Outpatient Medications:     NONFORMULARY, Take 2 tablets by mouth every evening Magtein tab before supper and at bedtime, Disp: , Rfl:     tamsulosin vitals reviewed. Laboratory Data:   Lab results were searched in Care Everywhere and/or those brought by the pateint were reviewed today with Caprice Mann and he has a copy of their most recent labs to take home with them as notedbelow;       Imaging Data:   Imaging Data:       Assessment & Plan:       Impression:  1. Frequency of micturition    2. Nocturia    3. Blood glucose elevated    4. Elevated lipids    5. Anxiety    6. Memory difficulties    7. Sleep difficulties    8. Bruising    9. Numbness and tingling in both hands    10. Lightheaded    11. Diarrhea due to malabsorption    12. Palpitation    13. Excessive sweating    14. Slow transit constipation    15. Aches      Assessment and Plan:  After reviewing the patients chief complaints, reviewing their labfindings in great detail (with the patient and those accompanying them) which correlate to their chief complaints, symptoms, and or medical conditions; suggestions were made relating to changes in diet and or supplementswhich may improve the complaints and which will be reflected in their future lab findings; Chief Complaint   Patient presents with    3 Month Follow-Up    Nasal Congestion     doesn't feel great   ;    Plans for the next visits:  - Abnormal and non-optimal Labs were ordered today to be repeated in the next 120-365 days to assess changes from adjustments in nutrition and or nutrients. - Patient instructed when having ablood draw to ask the  to divide their lab draws into multiple draws over several days if not feeling good at the time of the lab draw or if either prefers to do several smaller blood draws over several days  -Patient instructed to check with insurer before each lab draw and to to to the lab which the insurer directs them for the most cost effective lab draw with the least patient's cost  - Caprice Mann  will be scheduled subsequentto those results.   Magalis George will bring in his drink and food log to his next latex-sensitive patients show allergic reactions to fruits (avocados, bananas, kiwifruits, papayas, peaches),   Annals of Allergy, 1994. These plants contain the same proteins that are allergens in latex. People with fruit allergies should warn physicians beforeundergoing procedures which may cause anaphylactic reaction if in contact with latex gloves. Some of the common foods with defined cross-reactivity to latexare avocado, banana, kiwi, chestnut, raw potato, tomato,stone fruits (e.g., peach, cherry), hazelnut, melons, celery, carrot, apple, pear, papaya, and almond. Foods with less well-defined cross-reactivity to latex are peanuts, peppers, citrus fruits, coconut, pineapple, chivo,fig, passion fruit, Ugli fruit, and grape    This fruit/latex cross-reactivity is worsened by ethylene, a gas used to hasten commercial ripening. In nature, plants produce low levels of the hormone ethylene, which regulates germination, flowering, and ripening. Forced ripening by high ethyleneconcentrations, plants produce allergenic wound-repair proteins, which are similar to wound-repair proteins made during the tapping of rubber trees. Sensitive individualswho ingest the fruit get a higher dose and worse reaction. Some people may even first become sensitized to latex through fruit. Can food processing increase theconcentrations of allergenic proteins? Latex-sensitized children (and adults) in [place] often experience allergic reactions after eating bananas ripenedartificially with ethylene. In the [facility], food distribution centers treat unripe bananas and other produce with ethylene to ripen; not commonly done in [facility] since fruit is tree-ripened there. Does treatmentof food with ethylene induce banana proteins that cross-react with latex?  (Alina et al.    References:   Latex in Foods Allergy, http://ehp.niehs.nih.gov/members/2003/5811/5811.html    Search web for [employer] in Season \" for whereyou

## 2019-09-16 ENCOUNTER — TELEPHONE (OUTPATIENT)
Dept: UROLOGY | Age: 69
End: 2019-09-16

## 2019-09-16 ENCOUNTER — OFFICE VISIT (OUTPATIENT)
Dept: UROLOGY | Age: 69
End: 2019-09-16
Payer: MEDICARE

## 2019-09-16 VITALS
HEIGHT: 70 IN | BODY MASS INDEX: 30.97 KG/M2 | WEIGHT: 216.3 LBS | DIASTOLIC BLOOD PRESSURE: 94 MMHG | SYSTOLIC BLOOD PRESSURE: 158 MMHG

## 2019-09-16 DIAGNOSIS — R35.1 NOCTURIA: Primary | ICD-10-CM

## 2019-09-16 LAB
BILIRUBIN URINE: NEGATIVE
BLOOD URINE, POC: NEGATIVE
CHARACTER, URINE: CLEAR
COLOR, URINE: YELLOW
GLUCOSE URINE: NEGATIVE MG/DL
KETONES, URINE: NEGATIVE
LEUKOCYTE CLUMPS, URINE: NEGATIVE
NITRITE, URINE: NEGATIVE
PH, URINE: 5.5 (ref 5–9)
POST VOID RESIDUAL (PVR): 27 ML
PROTEIN, URINE: NEGATIVE MG/DL
SPECIFIC GRAVITY, URINE: 1.02 (ref 1–1.03)
UROBILINOGEN, URINE: 0.2 EU/DL (ref 0–1)

## 2019-09-16 PROCEDURE — 1036F TOBACCO NON-USER: CPT | Performed by: NURSE PRACTITIONER

## 2019-09-16 PROCEDURE — 1123F ACP DISCUSS/DSCN MKR DOCD: CPT | Performed by: NURSE PRACTITIONER

## 2019-09-16 PROCEDURE — 81003 URINALYSIS AUTO W/O SCOPE: CPT | Performed by: NURSE PRACTITIONER

## 2019-09-16 PROCEDURE — 99213 OFFICE O/P EST LOW 20 MIN: CPT | Performed by: NURSE PRACTITIONER

## 2019-09-16 PROCEDURE — 51798 US URINE CAPACITY MEASURE: CPT | Performed by: NURSE PRACTITIONER

## 2019-09-16 PROCEDURE — 4040F PNEUMOC VAC/ADMIN/RCVD: CPT | Performed by: NURSE PRACTITIONER

## 2019-09-16 PROCEDURE — G8417 CALC BMI ABV UP PARAM F/U: HCPCS | Performed by: NURSE PRACTITIONER

## 2019-09-16 PROCEDURE — 3017F COLORECTAL CA SCREEN DOC REV: CPT | Performed by: NURSE PRACTITIONER

## 2019-09-16 PROCEDURE — G8427 DOCREV CUR MEDS BY ELIG CLIN: HCPCS | Performed by: NURSE PRACTITIONER

## 2019-09-16 RX ORDER — SILODOSIN 4 MG/1
4 CAPSULE ORAL EVERY EVENING
Qty: 30 CAPSULE | Refills: 3 | Status: SHIPPED | OUTPATIENT
Start: 2019-09-16 | End: 2019-12-03 | Stop reason: SDUPTHER

## 2019-09-16 NOTE — PROGRESS NOTES
Cialis helping some with erections, however, not enough that he can attempt intercourse. Discussed Trimix injections with patient and wife. He woiuld like to proceed. RTO in 1 month with Ignacio Ly for symptom recheck and Trimix teaching. Call sooner with any questions or concerns. Return in about 4 weeks (around 10/14/2019) for BPH with LUTS, ED.     Rasheeda Salmeron, APRN-CNP  Urology

## 2019-09-20 ENCOUNTER — PATIENT MESSAGE (OUTPATIENT)
Dept: UROLOGY | Age: 69
End: 2019-09-20

## 2019-10-29 ENCOUNTER — OFFICE VISIT (OUTPATIENT)
Dept: UROLOGY | Age: 69
End: 2019-10-29
Payer: MEDICARE

## 2019-10-29 VITALS
DIASTOLIC BLOOD PRESSURE: 96 MMHG | HEIGHT: 70 IN | BODY MASS INDEX: 30.78 KG/M2 | WEIGHT: 215 LBS | SYSTOLIC BLOOD PRESSURE: 130 MMHG

## 2019-10-29 DIAGNOSIS — R35.1 NOCTURIA: ICD-10-CM

## 2019-10-29 DIAGNOSIS — N52.9 ERECTILE DYSFUNCTION, UNSPECIFIED ERECTILE DYSFUNCTION TYPE: ICD-10-CM

## 2019-10-29 DIAGNOSIS — N40.1 BENIGN LOCALIZED PROSTATIC HYPERPLASIA WITH LOWER URINARY TRACT SYMPTOMS (LUTS): Primary | ICD-10-CM

## 2019-10-29 PROCEDURE — 1123F ACP DISCUSS/DSCN MKR DOCD: CPT | Performed by: UROLOGY

## 2019-10-29 PROCEDURE — 4040F PNEUMOC VAC/ADMIN/RCVD: CPT | Performed by: UROLOGY

## 2019-10-29 PROCEDURE — 99214 OFFICE O/P EST MOD 30 MIN: CPT | Performed by: UROLOGY

## 2019-10-29 PROCEDURE — 3017F COLORECTAL CA SCREEN DOC REV: CPT | Performed by: UROLOGY

## 2019-10-29 PROCEDURE — G8417 CALC BMI ABV UP PARAM F/U: HCPCS | Performed by: UROLOGY

## 2019-10-29 PROCEDURE — 1036F TOBACCO NON-USER: CPT | Performed by: UROLOGY

## 2019-10-29 PROCEDURE — G8427 DOCREV CUR MEDS BY ELIG CLIN: HCPCS | Performed by: UROLOGY

## 2019-10-29 PROCEDURE — G8484 FLU IMMUNIZE NO ADMIN: HCPCS | Performed by: UROLOGY

## 2019-11-01 ENCOUNTER — PATIENT MESSAGE (OUTPATIENT)
Dept: UROLOGY | Age: 69
End: 2019-11-01

## 2019-11-02 LAB
ABSOLUTE BASO #: 0.1 X10E9/L (ref 0–0.9)
ABSOLUTE EOS #: 0.4 X10E9/L (ref 0–0.4)
ABSOLUTE LYMPH #: 1.3 X10E9/L (ref 1–3.5)
ABSOLUTE MONO #: 0.5 X10E9/L (ref 0–0.9)
ABSOLUTE NEUT #: 3.1 X10E9/L (ref 1.5–6.6)
AVERAGE GLUCOSE: 105 MG/DL
BASOPHILS RELATIVE PERCENT: 1 %
CALCIUM SERPL-MCNC: 9.8 MG/DL (ref 8.5–10.5)
CHOLESTEROL/HDL RATIO: 2.4 (ref 1–5)
CHOLESTEROL: 196 MG/DL (ref 150–200)
EOSINOPHILS RELATIVE PERCENT: 7.4 %
HBA1C MFR BLD: 5.3 % (ref 4.4–6.4)
HCT VFR BLD CALC: 44.9 % (ref 37–51)
HDLC SERPL-MCNC: 81 MG/DL
HEMOGLOBIN: 15.2 G/DL (ref 12.6–17.4)
HIGH SENSITIVE C-REACTIVE PROTEIN: 0.12 MG/DL (ref 0–0.74)
LDL CHOLESTEROL CALCULATED: 102 MG/DL
LDL/HDL RATIO: 1.3
LYMPHOCYTE %: 24.5 %
MAGNESIUM: 2.1 MG/DL (ref 1.8–2.6)
MCH RBC QN AUTO: 31.2 PG (ref 27–35)
MCHC RBC AUTO-ENTMCNC: 33.9 G/DL (ref 31–36)
MCV RBC AUTO: 92 FL (ref 81–101)
MONOCYTES # BLD: 8.8 %
NEUTROPHILS RELATIVE PERCENT: 58.3 %
PDW BLD-RTO: 14.1 % (ref 11.4–14.3)
PLATELETS: 276 X10E9/L (ref 150–450)
PMV BLD AUTO: 9.6 FL (ref 7–12)
RBC: 4.88 X10E12/L (ref 3.9–5.8)
SEDIMENTATION RATE, ERYTHROCYTE: 18 MM/H (ref 0–20)
TRIGL SERPL-MCNC: 67 MG/DL (ref 27–150)
VLDLC SERPL CALC-MCNC: 13 MG/DL (ref 0–30)
WBC: 5.2 X10E9/L (ref 4.4–12)

## 2019-11-04 LAB
HOMOCYSTINE, SERUM: 10 UMOL/L
SEX HORMONE BINDING GLOBULIN: 64.7 NMOL/L (ref 19.3–76.4)
TESTOSTERONE FREE, CALC: 53.9 PG/ML (ref 47–244)
TESTOSTERONE FREE: 420 NG/DL (ref 300–720)

## 2019-11-05 LAB
DHEAS (DHEA SULFATE): 213 UG/DL (ref 34–249)
PARATHYROID HORMONE INTACT: 43 PG/ML (ref 15–65)
VITAMIN D 25-HYDROXY: 42 NG/ML

## 2019-11-06 LAB — DEHYDROEPIANDROSTERONE: 3.5 NG/ML (ref 1.8–12.5)

## 2019-11-15 ENCOUNTER — OFFICE VISIT (OUTPATIENT)
Dept: FAMILY MEDICINE CLINIC | Age: 69
End: 2019-11-15
Payer: MEDICARE

## 2019-11-15 VITALS
TEMPERATURE: 97.4 F | SYSTOLIC BLOOD PRESSURE: 138 MMHG | HEART RATE: 79 BPM | WEIGHT: 213.8 LBS | OXYGEN SATURATION: 98 % | BODY MASS INDEX: 30.61 KG/M2 | DIASTOLIC BLOOD PRESSURE: 86 MMHG | HEIGHT: 70 IN | RESPIRATION RATE: 10 BRPM

## 2019-11-15 DIAGNOSIS — R19.7 DIARRHEA DUE TO MALABSORPTION: ICD-10-CM

## 2019-11-15 DIAGNOSIS — R00.2 PALPITATION: ICD-10-CM

## 2019-11-15 DIAGNOSIS — R41.3 MEMORY DIFFICULTIES: ICD-10-CM

## 2019-11-15 DIAGNOSIS — R68.89 HEAT INTOLERANCE: ICD-10-CM

## 2019-11-15 DIAGNOSIS — R20.2 NUMBNESS AND TINGLING IN BOTH HANDS: ICD-10-CM

## 2019-11-15 DIAGNOSIS — R35.1 NOCTURIA: ICD-10-CM

## 2019-11-15 DIAGNOSIS — T14.8XXA BRUISING: ICD-10-CM

## 2019-11-15 DIAGNOSIS — R20.0 NUMBNESS AND TINGLING IN BOTH HANDS: ICD-10-CM

## 2019-11-15 DIAGNOSIS — R73.9 BLOOD GLUCOSE ELEVATED: ICD-10-CM

## 2019-11-15 DIAGNOSIS — R63.5 WEIGHT GAIN: ICD-10-CM

## 2019-11-15 DIAGNOSIS — R35.0 FREQUENCY OF MICTURITION: Primary | ICD-10-CM

## 2019-11-15 DIAGNOSIS — K90.9 DIARRHEA DUE TO MALABSORPTION: ICD-10-CM

## 2019-11-15 DIAGNOSIS — E78.5 ELEVATED LIPIDS: ICD-10-CM

## 2019-11-15 DIAGNOSIS — G47.9 SLEEP DIFFICULTIES: ICD-10-CM

## 2019-11-15 DIAGNOSIS — R42 LIGHTHEADED: ICD-10-CM

## 2019-11-15 DIAGNOSIS — R61 EXCESSIVE SWEATING: ICD-10-CM

## 2019-11-15 PROCEDURE — G8417 CALC BMI ABV UP PARAM F/U: HCPCS | Performed by: FAMILY MEDICINE

## 2019-11-15 PROCEDURE — G8427 DOCREV CUR MEDS BY ELIG CLIN: HCPCS | Performed by: FAMILY MEDICINE

## 2019-11-15 PROCEDURE — G8484 FLU IMMUNIZE NO ADMIN: HCPCS | Performed by: FAMILY MEDICINE

## 2019-11-15 PROCEDURE — 99214 OFFICE O/P EST MOD 30 MIN: CPT | Performed by: FAMILY MEDICINE

## 2019-11-15 PROCEDURE — 3288F FALL RISK ASSESSMENT DOCD: CPT | Performed by: FAMILY MEDICINE

## 2019-11-15 PROCEDURE — 1123F ACP DISCUSS/DSCN MKR DOCD: CPT | Performed by: FAMILY MEDICINE

## 2019-11-15 PROCEDURE — 3017F COLORECTAL CA SCREEN DOC REV: CPT | Performed by: FAMILY MEDICINE

## 2019-11-15 PROCEDURE — 4040F PNEUMOC VAC/ADMIN/RCVD: CPT | Performed by: FAMILY MEDICINE

## 2019-11-15 PROCEDURE — 1036F TOBACCO NON-USER: CPT | Performed by: FAMILY MEDICINE

## 2019-12-03 ENCOUNTER — TELEPHONE (OUTPATIENT)
Dept: UROLOGY | Age: 69
End: 2019-12-03

## 2019-12-03 ENCOUNTER — OFFICE VISIT (OUTPATIENT)
Dept: UROLOGY | Age: 69
End: 2019-12-03
Payer: MEDICARE

## 2019-12-03 VITALS
DIASTOLIC BLOOD PRESSURE: 102 MMHG | HEIGHT: 70 IN | SYSTOLIC BLOOD PRESSURE: 168 MMHG | WEIGHT: 217 LBS | BODY MASS INDEX: 31.07 KG/M2

## 2019-12-03 DIAGNOSIS — N52.9 ERECTILE DYSFUNCTION, UNSPECIFIED ERECTILE DYSFUNCTION TYPE: ICD-10-CM

## 2019-12-03 DIAGNOSIS — N40.1 BENIGN LOCALIZED PROSTATIC HYPERPLASIA WITH LOWER URINARY TRACT SYMPTOMS (LUTS): Primary | ICD-10-CM

## 2019-12-03 DIAGNOSIS — R35.1 NOCTURIA: ICD-10-CM

## 2019-12-03 LAB — POST VOID RESIDUAL (PVR): 129 ML

## 2019-12-03 PROCEDURE — G8484 FLU IMMUNIZE NO ADMIN: HCPCS | Performed by: UROLOGY

## 2019-12-03 PROCEDURE — 1123F ACP DISCUSS/DSCN MKR DOCD: CPT | Performed by: UROLOGY

## 2019-12-03 PROCEDURE — 51798 US URINE CAPACITY MEASURE: CPT | Performed by: UROLOGY

## 2019-12-03 PROCEDURE — 4040F PNEUMOC VAC/ADMIN/RCVD: CPT | Performed by: UROLOGY

## 2019-12-03 PROCEDURE — 99213 OFFICE O/P EST LOW 20 MIN: CPT | Performed by: UROLOGY

## 2019-12-03 PROCEDURE — 1036F TOBACCO NON-USER: CPT | Performed by: UROLOGY

## 2019-12-03 PROCEDURE — G8417 CALC BMI ABV UP PARAM F/U: HCPCS | Performed by: UROLOGY

## 2019-12-03 PROCEDURE — G8427 DOCREV CUR MEDS BY ELIG CLIN: HCPCS | Performed by: UROLOGY

## 2019-12-03 PROCEDURE — 3017F COLORECTAL CA SCREEN DOC REV: CPT | Performed by: UROLOGY

## 2019-12-03 RX ORDER — SILODOSIN 4 MG/1
4 CAPSULE ORAL EVERY EVENING
Qty: 90 CAPSULE | Refills: 3 | Status: SHIPPED | OUTPATIENT
Start: 2019-12-03 | End: 2019-12-03 | Stop reason: SDUPTHER

## 2019-12-03 RX ORDER — SILODOSIN 4 MG/1
4 CAPSULE ORAL EVERY EVENING
Qty: 90 CAPSULE | Refills: 3 | Status: SHIPPED | OUTPATIENT
Start: 2019-12-03 | End: 2019-12-06

## 2019-12-06 RX ORDER — ALFUZOSIN HYDROCHLORIDE 10 MG/1
10 TABLET, EXTENDED RELEASE ORAL DAILY
Qty: 30 TABLET | Refills: 2 | Status: SHIPPED | OUTPATIENT
Start: 2019-12-06 | End: 2020-06-09

## 2020-06-09 ENCOUNTER — OFFICE VISIT (OUTPATIENT)
Dept: UROLOGY | Age: 70
End: 2020-06-09
Payer: MEDICARE

## 2020-06-09 VITALS — HEIGHT: 70 IN | WEIGHT: 207.2 LBS | TEMPERATURE: 98.5 F | BODY MASS INDEX: 29.66 KG/M2

## 2020-06-09 PROCEDURE — 3017F COLORECTAL CA SCREEN DOC REV: CPT | Performed by: UROLOGY

## 2020-06-09 PROCEDURE — 4040F PNEUMOC VAC/ADMIN/RCVD: CPT | Performed by: UROLOGY

## 2020-06-09 PROCEDURE — G8427 DOCREV CUR MEDS BY ELIG CLIN: HCPCS | Performed by: UROLOGY

## 2020-06-09 PROCEDURE — 99214 OFFICE O/P EST MOD 30 MIN: CPT | Performed by: UROLOGY

## 2020-06-09 PROCEDURE — 1123F ACP DISCUSS/DSCN MKR DOCD: CPT | Performed by: UROLOGY

## 2020-06-09 PROCEDURE — G8417 CALC BMI ABV UP PARAM F/U: HCPCS | Performed by: UROLOGY

## 2020-06-09 PROCEDURE — 1036F TOBACCO NON-USER: CPT | Performed by: UROLOGY

## 2020-06-09 RX ORDER — TADALAFIL 20 MG/1
20 TABLET ORAL PRN
Qty: 30 TABLET | Refills: 11 | Status: SHIPPED | OUTPATIENT
Start: 2020-06-09 | End: 2021-04-07

## 2020-06-09 RX ORDER — LISINOPRIL 20 MG/1
20 TABLET ORAL DAILY
COMMUNITY
End: 2021-04-07

## 2020-06-09 RX ORDER — SILODOSIN 8 MG/1
8 CAPSULE ORAL EVERY EVENING
Qty: 90 CAPSULE | Refills: 3 | Status: SHIPPED | OUTPATIENT
Start: 2020-06-09 | End: 2020-06-11 | Stop reason: SDUPTHER

## 2020-06-09 RX ORDER — TADALAFIL 5 MG/1
5 TABLET ORAL DAILY
Qty: 30 TABLET | Refills: 11 | Status: SHIPPED | OUTPATIENT
Start: 2020-06-09 | End: 2020-06-09

## 2020-06-09 NOTE — PATIENT INSTRUCTIONS
You may receive a survey regarding the care you received during your visit. Your input is valuable to us. We encourage you to complete and return your survey. We hope you will choose us in the future for your healthcare needs.
[Negative] : Genitourinary

## 2020-06-09 NOTE — PROGRESS NOTES
Social History     Substance and Sexual Activity   Alcohol Use Yes    Comment: occasional       REVIEW OF SYSTEMS:  Constitutional: negative  Eyes: negative  Respiratory: negative  Cardiovascular: negative  Gastrointestinal: negative  Genitourinary: see HPI  Musculoskeletal: negative  Skin: negative   Neurological: negative  Hematological/Lymphatic: negative  Psychological: negative        Physical Exam:    This a 71 y.o. male  Vitals:    06/09/20 1013   Temp: 98.5 °F (36.9 °C)     Body mass index is 30.16 kg/m². Constitutional: Patient in no acute distress;       Assessment and Plan        1. Benign localized prostatic hyperplasia with lower urinary tract symptoms (LUTS)    2. Erectile dysfunction, unspecified erectile dysfunction type               Plan:        Trimix was not as effective as the patient wanted. Did discuss IPP. Restart cialis 20 prn  Refill rapaflo. Discussed adding anticholinergic/mirabegron but discussed small retention risk. Also discussed possible need for cystoscopy if medications are not effective enough. At this time the patient is satisfied.    Follow up in one yr with psa with vani      Prescriptions Ordered:  Orders Placed This Encounter   Medications    DISCONTD: silodosin (RAPAFLO) 8 MG CAPS     Sig: Take 1 capsule by mouth every evening     Dispense:  90 capsule     Refill:  3    DISCONTD: tadalafil (CIALIS) 5 MG tablet     Sig: Take 1 tablet by mouth daily     Dispense:  30 tablet     Refill:  11    tadalafil (CIALIS) 20 MG tablet     Sig: Take 1 tablet by mouth as needed for Erectile Dysfunction Take at least 1/2 hour prior to sexual intercourse     Dispense:  30 tablet     Refill:  11      Orders Placed:  Orders Placed This Encounter   Procedures    PSA, Diagnostic     Standing Status:   Future     Standing Expiration Date:   12/9/2021            Inocente Lujan MD

## 2020-12-30 RX ORDER — SILODOSIN 8 MG/1
8 CAPSULE ORAL EVERY EVENING
Qty: 90 CAPSULE | Refills: 3 | Status: SHIPPED | OUTPATIENT
Start: 2020-12-30 | End: 2022-01-19 | Stop reason: SDUPTHER

## 2021-03-30 ENCOUNTER — HOSPITAL ENCOUNTER (OUTPATIENT)
Age: 71
Discharge: HOME | End: 2021-03-30
Payer: MEDICARE

## 2021-03-30 ENCOUNTER — HOSPITAL ENCOUNTER (OUTPATIENT)
Dept: GENERAL RADIOLOGY | Age: 71
Discharge: HOME | End: 2021-03-30
Payer: MEDICARE

## 2021-03-30 DIAGNOSIS — C43.9 MALIGNANT MELANOMA, UNSPECIFIED SITE (HCC): ICD-10-CM

## 2021-03-30 LAB
ALBUMIN SERPL-MCNC: 4.6 G/DL (ref 3.5–5.1)
ALP BLD-CCNC: 83 U/L (ref 38–126)
ALT SERPL-CCNC: 17 U/L (ref 11–66)
ANION GAP SERPL CALCULATED.3IONS-SCNC: 12 MEQ/L (ref 8–16)
AST SERPL-CCNC: 19 U/L (ref 5–40)
BILIRUB SERPL-MCNC: 0.4 MG/DL (ref 0.3–1.2)
BILIRUBIN DIRECT: < 0.2 MG/DL (ref 0–0.3)
BUN BLDV-MCNC: 17 MG/DL (ref 7–22)
C-REACTIVE PROTEIN: < 0.3 MG/DL (ref 0–1)
CALCIUM SERPL-MCNC: 10 MG/DL (ref 8.5–10.5)
CHLORIDE BLD-SCNC: 103 MEQ/L (ref 98–111)
CO2: 27 MEQ/L (ref 23–33)
CREAT SERPL-MCNC: 0.7 MG/DL (ref 0.4–1.2)
EKG ATRIAL RATE: 88 BPM
EKG P AXIS: 70 DEGREES
EKG P-R INTERVAL: 244 MS
EKG Q-T INTERVAL: 356 MS
EKG QRS DURATION: 96 MS
EKG QTC CALCULATION (BAZETT): 430 MS
EKG R AXIS: -24 DEGREES
EKG T AXIS: 33 DEGREES
EKG VENTRICULAR RATE: 88 BPM
ERYTHROCYTE [DISTWIDTH] IN BLOOD BY AUTOMATED COUNT: 13.7 % (ref 11.5–14.5)
ERYTHROCYTE [DISTWIDTH] IN BLOOD BY AUTOMATED COUNT: 47.6 FL (ref 35–45)
GFR SERPL CREATININE-BSD FRML MDRD: > 90 ML/MIN/1.73M2
GLUCOSE BLD-MCNC: 111 MG/DL (ref 70–108)
HCT VFR BLD CALC: 45.5 % (ref 42–52)
HEMOGLOBIN: 14.7 GM/DL (ref 14–18)
MCH RBC QN AUTO: 30.4 PG (ref 26–33)
MCHC RBC AUTO-ENTMCNC: 32.3 GM/DL (ref 32.2–35.5)
MCV RBC AUTO: 94.2 FL (ref 80–94)
PLATELET # BLD: 357 THOU/MM3 (ref 130–400)
PMV BLD AUTO: 10 FL (ref 9.4–12.4)
POTASSIUM SERPL-SCNC: 4.5 MEQ/L (ref 3.5–5.2)
RBC # BLD: 4.83 MILL/MM3 (ref 4.7–6.1)
SODIUM BLD-SCNC: 142 MEQ/L (ref 135–145)
TOTAL PROTEIN: 8.1 G/DL (ref 6.1–8)
WBC # BLD: 6.8 THOU/MM3 (ref 4.8–10.8)

## 2021-03-30 PROCEDURE — 71046 X-RAY EXAM CHEST 2 VIEWS: CPT

## 2021-03-30 PROCEDURE — 36415 COLL VENOUS BLD VENIPUNCTURE: CPT

## 2021-03-30 PROCEDURE — 86140 C-REACTIVE PROTEIN: CPT

## 2021-03-30 PROCEDURE — 93005 ELECTROCARDIOGRAM TRACING: CPT | Performed by: SPECIALIST

## 2021-03-30 PROCEDURE — 80053 COMPREHEN METABOLIC PANEL: CPT

## 2021-03-30 PROCEDURE — 85027 COMPLETE CBC AUTOMATED: CPT

## 2021-03-30 PROCEDURE — 93010 ELECTROCARDIOGRAM REPORT: CPT | Performed by: INTERNAL MEDICINE

## 2021-03-30 PROCEDURE — 82248 BILIRUBIN DIRECT: CPT

## 2021-04-07 ENCOUNTER — HOSPITAL ENCOUNTER (OUTPATIENT)
Age: 71
Discharge: HOME | End: 2021-04-07
Payer: MEDICARE

## 2021-04-07 PROCEDURE — U0003 INFECTIOUS AGENT DETECTION BY NUCLEIC ACID (DNA OR RNA); SEVERE ACUTE RESPIRATORY SYNDROME CORONAVIRUS 2 (SARS-COV-2) (CORONAVIRUS DISEASE [COVID-19]), AMPLIFIED PROBE TECHNIQUE, MAKING USE OF HIGH THROUGHPUT TECHNOLOGIES AS DESCRIBED BY CMS-2020-01-R: HCPCS

## 2021-04-07 PROCEDURE — U0005 INFEC AGEN DETEC AMPLI PROBE: HCPCS

## 2021-04-07 RX ORDER — AMLODIPINE BESYLATE 10 MG/1
10 TABLET ORAL DAILY
COMMUNITY

## 2021-04-07 NOTE — PROGRESS NOTES
PAT call attempted, patient unavailable, left message to please call us back at your earliest convenience; [phone]

## 2021-04-08 LAB
SARS-COV-2: NOT DETECTED
SOURCE: NORMAL

## 2021-04-13 ENCOUNTER — ANESTHESIA EVENT (OUTPATIENT)
Dept: OPERATING ROOM | Age: 71
End: 2021-04-13
Payer: MEDICARE

## 2021-04-14 ENCOUNTER — HOSPITAL ENCOUNTER (OUTPATIENT)
Dept: NUCLEAR MEDICINE | Age: 71
Discharge: HOME | End: 2021-04-14
Payer: MEDICARE

## 2021-04-14 ENCOUNTER — HOSPITAL ENCOUNTER (OUTPATIENT)
Age: 71
Setting detail: OUTPATIENT SURGERY
Discharge: HOME | End: 2021-04-14
Attending: SPECIALIST | Admitting: SPECIALIST
Payer: MEDICARE

## 2021-04-14 ENCOUNTER — ANESTHESIA (OUTPATIENT)
Dept: OPERATING ROOM | Age: 71
End: 2021-04-14
Payer: MEDICARE

## 2021-04-14 VITALS
TEMPERATURE: 98.1 F | HEART RATE: 94 BPM | BODY MASS INDEX: 28.82 KG/M2 | OXYGEN SATURATION: 98 % | DIASTOLIC BLOOD PRESSURE: 71 MMHG | HEIGHT: 69 IN | SYSTOLIC BLOOD PRESSURE: 125 MMHG | RESPIRATION RATE: 15 BRPM | WEIGHT: 194.6 LBS

## 2021-04-14 VITALS
SYSTOLIC BLOOD PRESSURE: 119 MMHG | OXYGEN SATURATION: 100 % | DIASTOLIC BLOOD PRESSURE: 60 MMHG | TEMPERATURE: 96.8 F | RESPIRATION RATE: 15 BRPM

## 2021-04-14 DIAGNOSIS — C43.61 MALIGNANT MELANOMA OF RIGHT UPPER EXTREMITY INCLUDING SHOULDER (HCC): ICD-10-CM

## 2021-04-14 PROCEDURE — A9541 TC99M SULFUR COLLOID: HCPCS | Performed by: SPECIALIST

## 2021-04-14 PROCEDURE — 2500000003 HC RX 250 WO HCPCS: Performed by: NURSE ANESTHETIST, CERTIFIED REGISTERED

## 2021-04-14 PROCEDURE — 7100000010 HC PHASE II RECOVERY - FIRST 15 MIN: Performed by: SPECIALIST

## 2021-04-14 PROCEDURE — 2580000003 HC RX 258: Performed by: SPECIALIST

## 2021-04-14 PROCEDURE — 3700000001 HC ADD 15 MINUTES (ANESTHESIA): Performed by: SPECIALIST

## 2021-04-14 PROCEDURE — 88342 IMHCHEM/IMCYTCHM 1ST ANTB: CPT

## 2021-04-14 PROCEDURE — 7100000000 HC PACU RECOVERY - FIRST 15 MIN: Performed by: SPECIALIST

## 2021-04-14 PROCEDURE — 6370000000 HC RX 637 (ALT 250 FOR IP): Performed by: SPECIALIST

## 2021-04-14 PROCEDURE — 2500000003 HC RX 250 WO HCPCS: Performed by: SPECIALIST

## 2021-04-14 PROCEDURE — 78195 LYMPH SYSTEM IMAGING: CPT

## 2021-04-14 PROCEDURE — 3430000000 HC RX DIAGNOSTIC RADIOPHARMACEUTICAL: Performed by: SPECIALIST

## 2021-04-14 PROCEDURE — 2709999900 HC NON-CHARGEABLE SUPPLY: Performed by: SPECIALIST

## 2021-04-14 PROCEDURE — 7100000001 HC PACU RECOVERY - ADDTL 15 MIN: Performed by: SPECIALIST

## 2021-04-14 PROCEDURE — 88305 TISSUE EXAM BY PATHOLOGIST: CPT

## 2021-04-14 PROCEDURE — 88307 TISSUE EXAM BY PATHOLOGIST: CPT

## 2021-04-14 PROCEDURE — 6360000002 HC RX W HCPCS: Performed by: NURSE ANESTHETIST, CERTIFIED REGISTERED

## 2021-04-14 PROCEDURE — 7100000011 HC PHASE II RECOVERY - ADDTL 15 MIN: Performed by: SPECIALIST

## 2021-04-14 PROCEDURE — 88341 IMHCHEM/IMCYTCHM EA ADD ANTB: CPT

## 2021-04-14 PROCEDURE — 3700000000 HC ANESTHESIA ATTENDED CARE: Performed by: SPECIALIST

## 2021-04-14 PROCEDURE — 3600000002 HC SURGERY LEVEL 2 BASE: Performed by: SPECIALIST

## 2021-04-14 PROCEDURE — 3600000012 HC SURGERY LEVEL 2 ADDTL 15MIN: Performed by: SPECIALIST

## 2021-04-14 RX ORDER — SODIUM CHLORIDE 9 MG/ML
INJECTION, SOLUTION INTRAVENOUS ONCE
Status: COMPLETED | OUTPATIENT
Start: 2021-04-14 | End: 2021-04-14

## 2021-04-14 RX ORDER — PROMETHAZINE HYDROCHLORIDE 25 MG/ML
12.5 INJECTION, SOLUTION INTRAMUSCULAR; INTRAVENOUS
Status: DISCONTINUED | OUTPATIENT
Start: 2021-04-14 | End: 2021-04-14 | Stop reason: HOSPADM

## 2021-04-14 RX ORDER — HYDROCODONE BITARTRATE AND ACETAMINOPHEN 5; 325 MG/1; MG/1
TABLET ORAL
Status: DISCONTINUED
Start: 2021-04-14 | End: 2021-04-14 | Stop reason: HOSPADM

## 2021-04-14 RX ORDER — LIDOCAINE HYDROCHLORIDE AND EPINEPHRINE 10; 10 MG/ML; UG/ML
INJECTION, SOLUTION INFILTRATION; PERINEURAL PRN
Status: DISCONTINUED | OUTPATIENT
Start: 2021-04-14 | End: 2021-04-14 | Stop reason: ALTCHOICE

## 2021-04-14 RX ORDER — ONDANSETRON 2 MG/ML
INJECTION INTRAMUSCULAR; INTRAVENOUS PRN
Status: DISCONTINUED | OUTPATIENT
Start: 2021-04-14 | End: 2021-04-14 | Stop reason: SDUPTHER

## 2021-04-14 RX ORDER — CLINDAMYCIN PHOSPHATE 150 MG/ML
INJECTION, SOLUTION INTRAVENOUS PRN
Status: DISCONTINUED | OUTPATIENT
Start: 2021-04-14 | End: 2021-04-14 | Stop reason: SDUPTHER

## 2021-04-14 RX ORDER — PROPOFOL 10 MG/ML
INJECTION, EMULSION INTRAVENOUS PRN
Status: DISCONTINUED | OUTPATIENT
Start: 2021-04-14 | End: 2021-04-14 | Stop reason: SDUPTHER

## 2021-04-14 RX ORDER — FENTANYL CITRATE 50 UG/ML
25 INJECTION, SOLUTION INTRAMUSCULAR; INTRAVENOUS EVERY 5 MIN PRN
Status: DISCONTINUED | OUTPATIENT
Start: 2021-04-14 | End: 2021-04-14 | Stop reason: HOSPADM

## 2021-04-14 RX ORDER — BUPIVACAINE HYDROCHLORIDE 5 MG/ML
INJECTION, SOLUTION EPIDURAL; INTRACAUDAL PRN
Status: DISCONTINUED | OUTPATIENT
Start: 2021-04-14 | End: 2021-04-14 | Stop reason: ALTCHOICE

## 2021-04-14 RX ORDER — FENTANYL CITRATE 50 UG/ML
50 INJECTION, SOLUTION INTRAMUSCULAR; INTRAVENOUS EVERY 5 MIN PRN
Status: DISCONTINUED | OUTPATIENT
Start: 2021-04-14 | End: 2021-04-14 | Stop reason: HOSPADM

## 2021-04-14 RX ORDER — MEPERIDINE HYDROCHLORIDE 25 MG/ML
12.5 INJECTION INTRAMUSCULAR; INTRAVENOUS; SUBCUTANEOUS EVERY 5 MIN PRN
Status: DISCONTINUED | OUTPATIENT
Start: 2021-04-14 | End: 2021-04-14 | Stop reason: HOSPADM

## 2021-04-14 RX ORDER — FENTANYL CITRATE 50 UG/ML
INJECTION, SOLUTION INTRAMUSCULAR; INTRAVENOUS PRN
Status: DISCONTINUED | OUTPATIENT
Start: 2021-04-14 | End: 2021-04-14 | Stop reason: SDUPTHER

## 2021-04-14 RX ORDER — LABETALOL 20 MG/4 ML (5 MG/ML) INTRAVENOUS SYRINGE
5 EVERY 10 MIN PRN
Status: DISCONTINUED | OUTPATIENT
Start: 2021-04-14 | End: 2021-04-14 | Stop reason: HOSPADM

## 2021-04-14 RX ORDER — LIDOCAINE HYDROCHLORIDE 20 MG/ML
INJECTION, SOLUTION EPIDURAL; INFILTRATION; INTRACAUDAL; PERINEURAL PRN
Status: DISCONTINUED | OUTPATIENT
Start: 2021-04-14 | End: 2021-04-14 | Stop reason: SDUPTHER

## 2021-04-14 RX ORDER — EPHEDRINE SULFATE/0.9% NACL/PF 50 MG/5 ML
SYRINGE (ML) INTRAVENOUS PRN
Status: DISCONTINUED | OUTPATIENT
Start: 2021-04-14 | End: 2021-04-14 | Stop reason: SDUPTHER

## 2021-04-14 RX ORDER — HYDROCODONE BITARTRATE AND ACETAMINOPHEN 5; 325 MG/1; MG/1
1 TABLET ORAL EVERY 6 HOURS PRN
Status: DISCONTINUED | OUTPATIENT
Start: 2021-04-14 | End: 2021-04-14 | Stop reason: HOSPADM

## 2021-04-14 RX ADMIN — SODIUM CHLORIDE: 9 INJECTION, SOLUTION INTRAVENOUS at 13:01

## 2021-04-14 RX ADMIN — FENTANYL CITRATE 50 MCG: 50 INJECTION, SOLUTION INTRAMUSCULAR; INTRAVENOUS at 12:40

## 2021-04-14 RX ADMIN — PHENYLEPHRINE HYDROCHLORIDE 100 MCG: 10 INJECTION INTRAVENOUS at 13:04

## 2021-04-14 RX ADMIN — ONDANSETRON HYDROCHLORIDE 4 MG: 4 INJECTION, SOLUTION INTRAMUSCULAR; INTRAVENOUS at 13:44

## 2021-04-14 RX ADMIN — PHENYLEPHRINE HYDROCHLORIDE 100 MCG: 10 INJECTION INTRAVENOUS at 12:22

## 2021-04-14 RX ADMIN — SODIUM CHLORIDE: 9 INJECTION, SOLUTION INTRAVENOUS at 12:06

## 2021-04-14 RX ADMIN — Medication 10 MG: at 12:39

## 2021-04-14 RX ADMIN — PHENYLEPHRINE HYDROCHLORIDE 100 MCG: 10 INJECTION INTRAVENOUS at 12:29

## 2021-04-14 RX ADMIN — Medication 1.01 MILLICURIE: at 08:16

## 2021-04-14 RX ADMIN — LIDOCAINE HYDROCHLORIDE 60 MG: 20 INJECTION, SOLUTION EPIDURAL; INFILTRATION; INTRACAUDAL; PERINEURAL at 12:11

## 2021-04-14 RX ADMIN — PHENYLEPHRINE HYDROCHLORIDE 100 MCG: 10 INJECTION INTRAVENOUS at 12:38

## 2021-04-14 RX ADMIN — HYDROCODONE BITARTRATE AND ACETAMINOPHEN 1 TABLET: 5; 325 TABLET ORAL at 14:48

## 2021-04-14 RX ADMIN — Medication 10 MG: at 12:27

## 2021-04-14 RX ADMIN — PHENYLEPHRINE HYDROCHLORIDE 100 MCG: 10 INJECTION INTRAVENOUS at 12:26

## 2021-04-14 RX ADMIN — FENTANYL CITRATE 50 MCG: 50 INJECTION, SOLUTION INTRAMUSCULAR; INTRAVENOUS at 12:11

## 2021-04-14 RX ADMIN — PROPOFOL 50 MG: 10 INJECTION, EMULSION INTRAVENOUS at 12:41

## 2021-04-14 RX ADMIN — Medication 10 MG: at 13:03

## 2021-04-14 RX ADMIN — CLINDAMYCIN PHOSPHATE 600 MG: 150 INJECTION, SOLUTION INTRAVENOUS at 12:15

## 2021-04-14 RX ADMIN — PROPOFOL 150 MG: 10 INJECTION, EMULSION INTRAVENOUS at 12:11

## 2021-04-14 RX ADMIN — Medication 10 MG: at 12:24

## 2021-04-14 ASSESSMENT — PULMONARY FUNCTION TESTS
PIF_VALUE: 14
PIF_VALUE: 6
PIF_VALUE: 6
PIF_VALUE: 4
PIF_VALUE: 6
PIF_VALUE: 3
PIF_VALUE: 0
PIF_VALUE: 1
PIF_VALUE: 4
PIF_VALUE: 4
PIF_VALUE: 5
PIF_VALUE: 4
PIF_VALUE: 20
PIF_VALUE: 4
PIF_VALUE: 5
PIF_VALUE: 4
PIF_VALUE: 1
PIF_VALUE: 4
PIF_VALUE: 5
PIF_VALUE: 4
PIF_VALUE: 5
PIF_VALUE: 4
PIF_VALUE: 5
PIF_VALUE: 4
PIF_VALUE: 6
PIF_VALUE: 4
PIF_VALUE: 5
PIF_VALUE: 6
PIF_VALUE: 5
PIF_VALUE: 4
PIF_VALUE: 1
PIF_VALUE: 4
PIF_VALUE: 4
PIF_VALUE: 6
PIF_VALUE: 5
PIF_VALUE: 3
PIF_VALUE: 4
PIF_VALUE: 1
PIF_VALUE: 4
PIF_VALUE: 5
PIF_VALUE: 4
PIF_VALUE: 5

## 2021-04-14 ASSESSMENT — PAIN SCALES - GENERAL
PAINLEVEL_OUTOF10: 4
PAINLEVEL_OUTOF10: 3
PAINLEVEL_OUTOF10: 3

## 2021-04-14 ASSESSMENT — LIFESTYLE VARIABLES: SMOKING_STATUS: 1

## 2021-04-14 ASSESSMENT — PAIN DESCRIPTION - PAIN TYPE: TYPE: SURGICAL PAIN

## 2021-04-14 NOTE — ANESTHESIA PRE PROCEDURE
Department of Anesthesiology  Preprocedure Note       Name:  Marshall Sandifer   Age:  79 y.o.  :  1950                                          MRN:  [id number]         Date:  2021      Surgeon: Richard Jimenez):  Ruben Jenkins MD    Procedure: Procedure(s):  EXCISION OF MELANOMA RIGHT UPPER ARM WITH SENTNOL LYMPHNODE BIOPSY    Medications prior to admission:   Prior to Admission medications    Medication Sig Start Date End Date Taking? Authorizing Provider   amLODIPine (NORVASC) 10 MG tablet Take 10 mg by mouth daily   Yes Historical Provider, MD   Cholecalciferol (VITAMIN D3) 1.25 MG (05921 UT) TABS Take by mouth daily   Yes Historical Provider, MD   silodosin (RAPAFLO) 8 MG CAPS Take 1 capsule by mouth every evening 12/30/20 3/30/21  Marlon Clinton MD   medical marijuana Take by mouth as needed. Bc    Historical Provider, MD       Current medications:    Current Facility-Administered Medications   Medication Dose Route Frequency Provider Last Rate Last Admin    0.9 % sodium chloride infusion   Intravenous Once Ruben Jenkins MD        ceFAZolin (ANCEF) 2000 mg in dextrose 5 % 50 mL IVPB  2,000 mg Intravenous 60 Aiden Grijalva MD           Allergies: Allergies   Allergen Reactions    Penicillins      Unsure of reaction HAPPENED WHEN A WAS A TEENAGER OR EARLY TWENTY'S       Problem List:    Patient Active Problem List   Diagnosis Code    Colonic mass K63.89    Excessive sweating R61    Weight gain R63.5    Sneeze R06.7    Vision abnormalities H53.9    Palpitation R00.2    Slow transit constipation K59.01    Diarrhea due to malabsorption K90.9, R19.7    Heat intolerance R68.89    Aches R52    Tinea pedis of both feet B35.3    Lightheaded R42    Numbness and tingling in both hands R20.0, R20.2    Bruising T14. 8XXA    Sleep difficulties G47.9    Memory difficulties R41.3    Anxiety F41.9    Elevated lipids E78.5    Blood glucose elevated R73.9       Past Medical History: Diagnosis Date    Arthritis     Hypertension     Vitamin D deficiency        Past Surgical History:        Procedure Laterality Date    COLONOSCOPY      DILATATION, ESOPHAGUS      INGUINAL HERNIA REPAIR      KNEE SURGERY      OTHER SURGICAL HISTORY  2017    robotic iliocecectomy       Social History:    Social History     Tobacco Use    Smoking status: Former Smoker     Packs/day: 1.00     Years: 25.00     Pack years: 25.00     Types: Cigarettes     Quit date:      Years since quittin.2    Smokeless tobacco: Never Used   Substance Use Topics    Alcohol use: Yes     Comment: occasional                                Counseling given: Not Answered      Vital Signs (Current):   Vitals:    21 1126 21 1130   BP:  (!) 155/79   Pulse:  116   Resp:  18   Temp:  97.1 °F (36.2 °C)   TempSrc:  Infrared   SpO2:  97%   Weight: 186 lb (84.4 kg) 194 lb 9.6 oz (88.3 kg)   Height: 5' 9\" (1.753 m) 5' 9\" (1.753 m)                                              BP Readings from Last 3 Encounters:   21 (!) 155/79   19 (!) 168/102   11/15/19 138/86       NPO Status: Time of last liquid consumption: 645(sip with meds)                        Time of last solid consumption:                         Date of last liquid consumption: 21                        Date of last solid food consumption: 21    BMI:   Wt Readings from Last 3 Encounters:   21 194 lb 9.6 oz (88.3 kg)   20 207 lb 3.2 oz (94 kg)   19 217 lb (98.4 kg)     Body mass index is 28.74 kg/m².     CBC:   Lab Results   Component Value Date    WBC 6.8 2021    RBC 4.83 2021    RBC 4.88 2019    HGB 14.7 2021    HCT 45.5 2021    MCV 94.2 2021    RDW 14.1 2019     2021       CMP:   Lab Results   Component Value Date     2021    K 4.5 2021     2021    CO2 27 2021    BUN 17 2021    CREATININE 0.7 2021

## 2021-04-14 NOTE — H&P
[address] [address]  History and Physical Update    Pt Name: Tyesha Barr  MRN: [id number]  [place]: 1950  Date of evaluation: 4/14/2021    I have examined the patient and reviewed the H&P/Consult and there are no changes to the patient or plans.       Olivia Tran  Electronically signed 4/14/2021 at 6:59 AM

## 2021-04-14 NOTE — OP NOTE
Operative Note    Patient name: Tyesha Barr             Medical Record Number: [id number]    Primary Care Physician: MD EFRAÍN Dominguez 1950    Date of Procedure: 2021    Pre-operative Diagnosis: 3cm length, 0.5mm Breslow depth malignant melanoma of right upper lateral arm    Post-operative Diagnosis: Same    Procedure Performed: 5cm reexcision of right upper lateral arm malignant melanoma with 11cm complex closure and deep right sentinel lymph node excisional biopsy (CPT 01136, 81305, 54631 & 56604)    Surgeons/Assistants: Dr. Ulysses Mora PA-C    Estimated Blood Loss: 95TO     Complications: none immediately appreciated    Procedure: With the patient lying in the supine position and under adequate anesthesia per the anesthesia team.  The area was then prepped  draped in the standard surgical fashion. 3 ml of blue dye was injected intradermally around the previous excision site and after identifying the maximal area of uptake in the right axilla area with the gamma probe (as identified preoperatively as drainage area based upon preoperative lymphoscintigraphy) an incision was made and the sentinel lymph node identified deep within the axilla through its gamma reading. At least 1cm macroscopically clear margin was performed down to the fascia. The required extensive undermining and complex closure was completed with 2-0 PDS and 3-0 Monocryl respectively. The right axillary lymph incision was closed with 3-0 Monocryl suture after placement of 19Fr round closed suction drain. Final closure was completed using skin staples of both surgical sites. 20 ml of 0.5% Marcaine with epinephrine was injected to help with postoperative pain. Bacitracin and bulky sterile dressings were then applied. The patient tolerated the procedure quite well and remained hemodynamically stable throughout the procedure and was quite comfortable throughout the operative course.     Clinical staging for cancer cases:  Ct  Cn  Cm    William Macias  Electronically signed by me on 4/14/2021 at 1:51 PM  Operative Note      Patient: Latisha Quiroz  YOB: 1950  MRN: [id number]    Date of Procedure: 4/14/2021    Pre-Op Diagnosis: MELANOMA RIGHT UPPER ARM    Post-Op Diagnosis: Same       Procedure(s):  EXCISION OF MELANOMA RIGHT UPPER ARM WITH SENTNEL LYMPH NODE BIOPSY RIGHT AXILLA    Surgeon(s):  William Macias MD    Assistant:   Physician Assistant: Lucina Fitch PA-C    Anesthesia: General    Estimated Blood Loss (mL): Minimal    Complications: None    Specimens:   ID Type Source Tests Collected by Time Destination   A :  Tissue Arm SURGICAL PATHOLOGY William Macias MD 4/14/2021 1306    B :  Tissue Lymph Node SURGICAL PATHOLOGY William Macias MD 4/14/2021 1347        Implants:  * No implants in log *      Drains:   Closed/Suction Drain Proximal;Right;Superior Other (Comment) Bulb 19 Turkmen (Active)       Findings: 3cm length, 0.5mm Breslow depth malignant melanoma of right upper lateral arm    Detailed Description of Procedure:   5cm reexcision of right upper lateral arm malignant melanoma with 11cm complex closure and deep right sentinel lymph node excisional biopsy (CPT 22225, 04014, 31620 & 26365)    Electronically signed by William Macias MD on 4/14/2021 at 1:49 PM

## 2021-04-14 NOTE — PROGRESS NOTES
[facility] Wife to side, needs denied. C/O increase in drain discomfort under his arm, requesting pain pill \"in a bit\". 1434 Snacks given. DESIREE drain without drainage. 1448 Patient medicated per request.   1500 Patient resting, denies needs. 1515 Patient states \"pain starting to feel better\" Discharge instructions given, understanding voiced, questions answered. Patient and wife instructed on emptying of DESIREE drain, twice daily and as needed, with recording of drainage amounts, instructed to bring to post op visit. Questions answered and understanding voiced. [address] for discharge, wife assisting. Needs denied. 1544 To car with cane per pt request. States pain 3/10 and decreasing. Denies further needs.

## 2021-04-15 NOTE — ANESTHESIA POSTPROCEDURE EVALUATION
Department of Anesthesiology  Postprocedure Note    Patient: Eugenie Harding  MRN: [id number]  YOB: 1950  Date of evaluation: 4/14/2021  Time:  8:34 PM     Procedure Summary     Date: 04/14/21 Room / Location: [address] [place] [address] 02 / [address] [facility] [place]    Anesthesia Start: 1428 Anesthesia Stop: 0840    Procedure: EXCISION OF MELANOMA RIGHT UPPER ARM WITH SENTNEL LYMPH NODE BIOPSY RIGHT AXILLA (Right ) Diagnosis: (MELANOMA RIGHT UPPER ARM)    Surgeons: Megan Meyer MD Responsible Provider: ySeda West DO    Anesthesia Type: general ASA Status: 2          Anesthesia Type: general    Liat Phase I: Liat Score: 10    Liat Phase II: Liat Score: 10    Last vitals: Reviewed and per EMR flowsheets.        Anesthesia Post Evaluation    Patient location during evaluation: PACU  Patient participation: complete - patient participated  Level of consciousness: awake  Airway patency: patent  Nausea & Vomiting: no nausea and no vomiting  Complications: no  Cardiovascular status: blood pressure returned to baseline  Respiratory status: acceptable  Hydration status: euvolemic

## 2021-06-03 ENCOUNTER — NURSE ONLY (OUTPATIENT)
Dept: LAB | Age: 71
End: 2021-06-03

## 2021-06-08 ENCOUNTER — OFFICE VISIT (OUTPATIENT)
Dept: UROLOGY | Age: 71
End: 2021-06-08
Payer: MEDICARE

## 2021-06-08 VITALS — BODY MASS INDEX: 28.44 KG/M2 | WEIGHT: 192 LBS | HEIGHT: 69 IN

## 2021-06-08 DIAGNOSIS — N40.1 BENIGN LOCALIZED PROSTATIC HYPERPLASIA WITH LOWER URINARY TRACT SYMPTOMS (LUTS): Primary | ICD-10-CM

## 2021-06-08 DIAGNOSIS — N52.9 ERECTILE DYSFUNCTION, UNSPECIFIED ERECTILE DYSFUNCTION TYPE: ICD-10-CM

## 2021-06-08 PROCEDURE — 1036F TOBACCO NON-USER: CPT | Performed by: UROLOGY

## 2021-06-08 PROCEDURE — 1123F ACP DISCUSS/DSCN MKR DOCD: CPT | Performed by: UROLOGY

## 2021-06-08 PROCEDURE — 99214 OFFICE O/P EST MOD 30 MIN: CPT | Performed by: UROLOGY

## 2021-06-08 PROCEDURE — G8427 DOCREV CUR MEDS BY ELIG CLIN: HCPCS | Performed by: UROLOGY

## 2021-06-08 PROCEDURE — 3017F COLORECTAL CA SCREEN DOC REV: CPT | Performed by: UROLOGY

## 2021-06-08 PROCEDURE — G8417 CALC BMI ABV UP PARAM F/U: HCPCS | Performed by: UROLOGY

## 2021-06-08 PROCEDURE — 4040F PNEUMOC VAC/ADMIN/RCVD: CPT | Performed by: UROLOGY

## 2021-06-08 RX ORDER — FINASTERIDE 5 MG/1
5 TABLET, FILM COATED ORAL DAILY
Qty: 90 TABLET | Refills: 3 | Status: SHIPPED | OUTPATIENT
Start: 2021-06-08 | End: 2022-06-08 | Stop reason: SDUPTHER

## 2021-06-08 NOTE — PROGRESS NOTES
Jt Portillo MD        [address] [place] [address] [phone]  Dept: [phone]  Dept Fax: [phone]: [address] [address] Urology Office Note      Patient:  Nabil Hernandes  YOB: 1950    The patient is a 79 y.o. male who presents today for evaluation of the following problems:   Chief Complaint   Patient presents with    Follow-up     bph/ED/ psa prior         HISTORY OF PRESENT ILLNESS:     ED  Trimix caused mild erections- still not good enough for penetration  cialis not helpful  Cannot penetrate. BPH with nocturia    Nocturia x 4. Frequency. Strong stream but feels he incompletely empties his bladder  On rapaflo  flomax has not helped        Requested/reviewed records from Nadeem Marin MD office and/or outside physician/EMR    (Patient's old records have been requested, reviewed and pertinent findings summarized in today's note.)    Procedures Today: N/A    Last several PSA's:  No results found for: PSA    Last total testosterone:  Lab Results   Component Value Date    TESTOSTERONE 3.47 08/06/2019       Urinalysis today:  No results found for this visit on 06/08/21. Last BUN and creatinine:  Lab Results   Component Value Date    BUN 17 03/30/2021     Lab Results   Component Value Date    CREATININE 0.7 03/30/2021       Imaging Reviewed during this Office Visit:   Jt Portillo MD independently reviewed the images and verified the radiology reports from:    Ct Abdomen Pelvis W Iv Contrast Additional Contrast? Oral    Result Date: 4/23/2019  CT ABDOMEN AND PELVIS WITH CONTRAST ENHANCEMENT: CLINICAL INFORMATION: Malignant neoplasm of appendix [facility]) COMPARISON: 4/17/2018 TECHNIQUE: Multiple axial 5 mm images of the abdomen, pelvis, and lung bases were obtained following the administration of oral and intravenous contrast material (ISOVUE).  Computer generated sagittal and coronal images of the inherent in voice recognition technology. ** Final report electronically signed by Dr. Naomy Prasad on 2019 10:45 AM      PAST MEDICAL, FAMILY AND SOCIAL HISTORY:  Past Medical History:   Diagnosis Date    Arthritis     Hypertension     Vitamin D deficiency      Past Surgical History:   Procedure Laterality Date    ARM SURGERY Right 2021    EXCISION OF MELANOMA RIGHT UPPER ARM WITH SENTNEL LYMPH NODE BIOPSY RIGHT AXILLA performed by Nieves Malik MD at [address], [facility]      INGUINAL HERNIA REPAIR      KNEE SURGERY      OTHER SURGICAL HISTORY  2017    robotic iliocecectomy    SKIN CANCER EXCISION  2021    melanoma removal Dr Nahomi Barnes- also removed lymph nodes      Family History   Problem Relation Age of Onset    Heart Disease Father     Cancer Father      Outpatient Medications Marked as Taking for the 21 encounter (Office Visit) with Renuka Moseley MD   Medication Sig Dispense Refill    amLODIPine (NORVASC) 10 MG tablet Take 10 mg by mouth daily      silodosin (RAPAFLO) 8 MG CAPS Take 1 capsule by mouth every evening 90 capsule 3    medical marijuana Take by mouth as needed.  Gummys         Penicillins  Social History     Tobacco Use   Smoking Status Former Smoker    Packs/day: 1.00    Years: 25.00    Pack years: 25.00    Types: Cigarettes    Start date: 1968   [facility] Quit date:     Years since quittin.4   Smokeless Tobacco Never Used      (If patient a smoker, smoking cessation counseling offered)   Social History     Substance and Sexual Activity   Alcohol Use Yes    Alcohol/week: 2.0 standard drinks    Types: 2 Standard drinks or equivalent per week    Comment: occasional       REVIEW OF SYSTEMS:  Constitutional: negative  Eyes: negative  Respiratory: negative  Cardiovascular: negative  Gastrointestinal: negative  Genitourinary: see HPI  Musculoskeletal: negative  Skin: negative   Neurological: negative  Hematological/Lymphatic: negative  Psychological: negative        Physical Exam:    This a 79 y.o. male  There were no vitals filed for this visit. Body mass index is 28.35 kg/m². Constitutional: Patient in no acute distress;       Assessment and Plan        1. Benign localized prostatic hyperplasia with lower urinary tract symptoms (LUTS)    2. Erectile dysfunction, unspecified erectile dysfunction type               Plan:      PSA reviewed  ED- no longer interested in trimix or meds or IPP. BPH- Refill rapaflo and start finasteride. Discussed adding anticholinergic/mirabegron but discussed small retention risk. Also discussed possible need for cystoscopy if medications are not effective enough. Prescriptions Ordered:  No orders of the defined types were placed in this encounter. Orders Placed:  No orders of the defined types were placed in this encounter.            Kinza Kent MD

## 2021-07-28 ENCOUNTER — HOSPITAL ENCOUNTER (EMERGENCY)
Age: 71
Discharge: HOME | End: 2021-07-28
Attending: INTERNAL MEDICINE
Payer: MEDICARE

## 2021-07-28 ENCOUNTER — APPOINTMENT (OUTPATIENT)
Dept: GENERAL RADIOLOGY | Age: 71
End: 2021-07-28
Payer: MEDICARE

## 2021-07-28 ENCOUNTER — APPOINTMENT (OUTPATIENT)
Dept: CT IMAGING | Age: 71
End: 2021-07-28
Payer: MEDICARE

## 2021-07-28 VITALS
BODY MASS INDEX: 28.14 KG/M2 | WEIGHT: 190 LBS | RESPIRATION RATE: 16 BRPM | SYSTOLIC BLOOD PRESSURE: 139 MMHG | HEIGHT: 69 IN | HEART RATE: 83 BPM | TEMPERATURE: 97.6 F | DIASTOLIC BLOOD PRESSURE: 76 MMHG | OXYGEN SATURATION: 97 %

## 2021-07-28 DIAGNOSIS — R55 SYNCOPE AND COLLAPSE: Primary | ICD-10-CM

## 2021-07-28 DIAGNOSIS — E86.0 DEHYDRATION: ICD-10-CM

## 2021-07-28 LAB
ALBUMIN SERPL-MCNC: 4.2 G/DL (ref 3.5–5.1)
ALP BLD-CCNC: 87 U/L (ref 38–126)
ALT SERPL-CCNC: 13 U/L (ref 11–66)
ANION GAP SERPL CALCULATED.3IONS-SCNC: 11 MEQ/L (ref 8–16)
AST SERPL-CCNC: 16 U/L (ref 5–40)
BASOPHILS # BLD: 0.9 %
BASOPHILS ABSOLUTE: 0 THOU/MM3 (ref 0–0.1)
BILIRUB SERPL-MCNC: 0.5 MG/DL (ref 0.3–1.2)
BILIRUBIN URINE: NEGATIVE
BLOOD, URINE: NEGATIVE
BUN BLDV-MCNC: 19 MG/DL (ref 7–22)
CALCIUM SERPL-MCNC: 9.5 MG/DL (ref 8.5–10.5)
CHARACTER, URINE: CLEAR
CHLORIDE BLD-SCNC: 101 MEQ/L (ref 98–111)
CO2: 24 MEQ/L (ref 23–33)
COLOR: YELLOW
CREAT SERPL-MCNC: 0.9 MG/DL (ref 0.4–1.2)
EOSINOPHIL # BLD: 3.6 %
EOSINOPHILS ABSOLUTE: 0.2 THOU/MM3 (ref 0–0.4)
ERYTHROCYTE [DISTWIDTH] IN BLOOD BY AUTOMATED COUNT: 13.2 % (ref 11.5–14.5)
ERYTHROCYTE [DISTWIDTH] IN BLOOD BY AUTOMATED COUNT: 45.3 FL (ref 35–45)
GFR SERPL CREATININE-BSD FRML MDRD: 83 ML/MIN/1.73M2
GLUCOSE BLD-MCNC: 107 MG/DL (ref 70–108)
GLUCOSE BLD-MCNC: 109 MG/DL (ref 70–108)
GLUCOSE URINE: NEGATIVE MG/DL
HCT VFR BLD CALC: 41.4 % (ref 42–52)
HEMOGLOBIN: 13.8 GM/DL (ref 14–18)
IMMATURE GRANS (ABS): 0.01 THOU/MM3 (ref 0–0.07)
IMMATURE GRANULOCYTES: 0.2 %
KETONES, URINE: ABNORMAL
LEUKOCYTE ESTERASE, URINE: NEGATIVE
LIPASE: 30.9 U/L (ref 5.6–51.3)
LYMPHOCYTES # BLD: 24.5 %
LYMPHOCYTES ABSOLUTE: 1.3 THOU/MM3 (ref 1–4.8)
MCH RBC QN AUTO: 30.7 PG (ref 26–33)
MCHC RBC AUTO-ENTMCNC: 33.3 GM/DL (ref 32.2–35.5)
MCV RBC AUTO: 92.2 FL (ref 80–94)
MONOCYTES # BLD: 9.4 %
MONOCYTES ABSOLUTE: 0.5 THOU/MM3 (ref 0.4–1.3)
NITRITE, URINE: NEGATIVE
NUCLEATED RED BLOOD CELLS: 0 /100 WBC
OSMOLALITY CALCULATION: 274.7 MOSMOL/KG (ref 275–300)
PH UA: 6.5 (ref 5–9)
PLATELET # BLD: 231 THOU/MM3 (ref 130–400)
PMV BLD AUTO: 11.1 FL (ref 9.4–12.4)
POTASSIUM REFLEX MAGNESIUM: 4.2 MEQ/L (ref 3.5–5.2)
PROTEIN UA: NEGATIVE
RBC # BLD: 4.49 MILL/MM3 (ref 4.7–6.1)
SARS-COV-2, NAAT: NOT DETECTED
SEG NEUTROPHILS: 61.4 %
SEGMENTED NEUTROPHILS ABSOLUTE COUNT: 3.3 THOU/MM3 (ref 1.8–7.7)
SODIUM BLD-SCNC: 136 MEQ/L (ref 135–145)
SPECIFIC GRAVITY, URINE: 1.01 (ref 1–1.03)
TOTAL PROTEIN: 6.9 G/DL (ref 6.1–8)
TROPONIN T: < 0.01 NG/ML
UROBILINOGEN, URINE: 0.2 EU/DL (ref 0–1)
WBC # BLD: 5.3 THOU/MM3 (ref 4.8–10.8)

## 2021-07-28 PROCEDURE — 36415 COLL VENOUS BLD VENIPUNCTURE: CPT

## 2021-07-28 PROCEDURE — 96361 HYDRATE IV INFUSION ADD-ON: CPT

## 2021-07-28 PROCEDURE — 83690 ASSAY OF LIPASE: CPT

## 2021-07-28 PROCEDURE — 99284 EMERGENCY DEPT VISIT MOD MDM: CPT

## 2021-07-28 PROCEDURE — 2580000003 HC RX 258: Performed by: INTERNAL MEDICINE

## 2021-07-28 PROCEDURE — 81003 URINALYSIS AUTO W/O SCOPE: CPT

## 2021-07-28 PROCEDURE — 72125 CT NECK SPINE W/O DYE: CPT

## 2021-07-28 PROCEDURE — 85025 COMPLETE CBC W/AUTO DIFF WBC: CPT

## 2021-07-28 PROCEDURE — 80053 COMPREHEN METABOLIC PANEL: CPT

## 2021-07-28 PROCEDURE — 84484 ASSAY OF TROPONIN QUANT: CPT

## 2021-07-28 PROCEDURE — 82948 REAGENT STRIP/BLOOD GLUCOSE: CPT

## 2021-07-28 PROCEDURE — 70450 CT HEAD/BRAIN W/O DYE: CPT

## 2021-07-28 PROCEDURE — 93005 ELECTROCARDIOGRAM TRACING: CPT | Performed by: EMERGENCY MEDICINE

## 2021-07-28 PROCEDURE — 87635 SARS-COV-2 COVID-19 AMP PRB: CPT

## 2021-07-28 PROCEDURE — 96360 HYDRATION IV INFUSION INIT: CPT

## 2021-07-28 PROCEDURE — 71045 X-RAY EXAM CHEST 1 VIEW: CPT

## 2021-07-28 RX ORDER — 0.9 % SODIUM CHLORIDE 0.9 %
1000 INTRAVENOUS SOLUTION INTRAVENOUS ONCE
Status: COMPLETED | OUTPATIENT
Start: 2021-07-28 | End: 2021-07-28

## 2021-07-28 RX ADMIN — SODIUM CHLORIDE 1000 ML: 9 INJECTION, SOLUTION INTRAVENOUS at 14:30

## 2021-07-28 NOTE — ED NOTES
Bed: 021A  Expected date:   Expected time:   Means of arrival: [facility]  Comments:     Faina Shaver RN  07/28/21 7639

## 2021-07-28 NOTE — ED PROVIDER NOTES
eMERGENCY dEPARTMENT eNCOUnter      [address] [facility]    Chief Complaint   Patient presents with    Loss of Consciousness       HPI    Melisa Hendrix is a 79 y.o. male who presents to the emergency department after a syncopal episode. Patient passed out for about a minute and fell on the ground. Patient does not complain of any pain in any part of the body. Patient did not have any warning before patient fell. Patient was not feeling dizzy or tired. Patient did not eat anything today. Patient has similar symptoms about 3 weeks ago which were relieved when patient sat down. There is no chest pain or chest pressure. There is no nausea, vomiting, dizziness, sweating or palpitations associated with the symptoms. Patient did not have any weakness tingling numbness in any part of the body. Patient is feeling back to normal at the time of my examination.   Patient is not immunized against Covid but is not complaining of any anosmia or problem with his taste, fever chills or shortness of breath    PAST MEDICAL HISTORY    Past Medical History:   Diagnosis Date    Arthritis     Hypertension     Vitamin D deficiency        SURGICAL HISTORY    Past Surgical History:   Procedure Laterality Date    ARM SURGERY Right 4/14/2021    EXCISION OF MELANOMA RIGHT UPPER ARM WITH SENTNEL LYMPH NODE BIOPSY RIGHT AXILLA performed by Nalini Forrester MD at [facility] [address] [facility]      INGUINAL HERNIA REPAIR      KNEE SURGERY      OTHER SURGICAL HISTORY  04/04/2017    robotic iliocecectomy    SKIN CANCER EXCISION  03/2021    melanoma removal Dr Irma Carrasco- also removed lymph nodes        CURRENT MEDICATIONS    Current Outpatient Rx   Medication Sig Dispense Refill    finasteride (PROSCAR) 5 MG tablet Take 1 tablet by mouth daily 90 tablet 3    amLODIPine (NORVASC) 10 MG tablet Take 10 mg by mouth daily      silodosin (RAPAFLO) 8 MG CAPS Take 1 capsule by mouth every evening 90 capsule 3  medical marijuana Take by mouth as needed. Gummys         ALLERGIES    Allergies   Allergen Reactions    Penicillins      Unsure of reaction HAPPENED WHEN A WAS A TEENAGER OR EARLY TWENTY'S       FAMILY HISTORY    Family History   Problem Relation Age of Onset    Heart Disease Father     Cancer Father        SOCIAL HISTORY    Social History     Socioeconomic History    Marital status:      Spouse name: None    Number of children: None    Years of education: None    Highest education level: None   Occupational History    None   Tobacco Use    Smoking status: Former Smoker     Packs/day: 1.00     Years: 25.00     Pack years: 25.00     Types: Cigarettes     Start date: 1968     Quit date:      Years since quittin.5    Smokeless tobacco: Never Used   Substance and Sexual Activity    Alcohol use: Yes     Alcohol/week: 2.0 standard drinks     Types: 2 Standard drinks or equivalent per week     Comment: occasional    Drug use: Yes     Types: Marijuana     Comment: medical    Sexual activity: None   Other Topics Concern    None   Social History Narrative    None     Social Determinants of Health     Financial Resource Strain:     Difficulty of Paying Living Expenses:    Food Insecurity:     Worried About Running Out of Food in the Last Year:     Ran Out of Food in the Last Year:    Transportation Needs:     Lack of Transportation (Medical):      Lack of Transportation (Non-Medical):    Physical Activity:     Days of Exercise per Week:     Minutes of Exercise per Session:    Stress:     Feeling of Stress :    Social Connections:     Frequency of Communication with Friends and Family:     Frequency of Social Gatherings with Friends and Family:     Attends Zoroastrian Services:     Active Member of Clubs or Organizations:     Attends Club or Organization Meetings:     Marital Status:    Intimate Partner Violence:     Fear of Current or Ex-Partner:     Emotionally Abused:     Physically Abused:     Sexually Abused:        REVIEW OF SYSTEMS    Constitutional:  Denies fever, chills, weight loss or weakness   Eyes:  Denies photophobia or discharge   HENT:  Denies sore throat or ear pain   Respiratory:  Denies cough or shortness of breath   Cardiovascular:  Denies chest pain, palpitations or swelling   GI:  Denies abdominal pain, nausea, vomiting, or diarrhea   Musculoskeletal:  Denies back pain   Skin:  Denies rash   Neurologic:  Denies headache, focal weakness or sensory changes   Endocrine:  Denies polyuria or polydypsia   Lymphatic:  Denies swollen glands   Psychiatric:  Denies depression, suicidal ideation or homicidal ideation   All systems negative except as marked. PHYSICAL EXAM    VITAL SIGNS: /76   Pulse 83   Temp 97.6 °F (36.4 °C) (Oral)   Resp 16   Ht 5' 9\" (1.753 m)   Wt 190 lb (86.2 kg)   SpO2 97%   BMI 28.06 kg/m²    Constitutional:  Well developed, Well nourished, No acute distress, Non-toxic appearance. HENT:  Normocephalic, Atraumatic, Bilateral external ears normal, Oropharynx moist, No oral exudates, Nose normal. Neck- Normal range of motion, No tenderness, Supple, No stridor. Eyes:  PERRL, EOMI, Conjunctiva normal, No discharge. Respiratory:  Normal breath sounds, No respiratory distress, No wheezing, No chest tenderness. Cardiovascular:  Normal heart rate, Normal rhythm, No murmurs, No rubs, No gallops. GI:  Bowel sounds normal, Soft, No tenderness, No masses, No pulsatile masses. : External genitalia appear normal, No masses or lesions. No discharge. No CVA tenderness. Musculoskeletal:  Intact distal pulses, No edema, No tenderness, No cyanosis, No clubbing. Good range of motion in all major joints. No tenderness to palpation or major deformities noted. Back- No tenderness. Integument:  Warm, Dry, No erythema, No rash. Lymphatic:  No lymphadenopathy noted.    Neurologic:  Alert & oriented x 3, Normal motor function, Normal sensory function, No focal deficits noted. Psychiatric:  Affect normal, Judgment normal, Mood normal.     EKG    Sinus rhythm with first-degree AV block  Left axis deviation  Ventricular to 68  MA interval 242 ms  QRS duration 80 ms  QTc interval 393 ms  RADIOLOGY    XR CHEST PORTABLE   Final Result   Stable chest no acute cardiopulmonary disease            **This report has been created using voice recognition software. It may contain minor errors which are inherent in voice recognition technology. **      Final report electronically signed by Dr. Christi Santiago on 7/28/2021 2:59 PM      CT Head WO Contrast   Final Result   No acute intracranial pathology            **This report has been created using voice recognition software. It may contain minor errors which are inherent in voice recognition technology. **      Final report electronically signed by Dr. Christi Santiago on 7/28/2021 2:41 PM      CT Cervical Spine WO Contrast   Final Result   1. No acute osseous abnormality   2. Multilevel bilateral neural foraminal narrowing and central canal stenosis at C5-6 and C6-7            **This report has been created using voice recognition software. It may contain minor errors which are inherent in voice recognition technology. **      Final report electronically signed by Dr. Christi Santiago on 7/28/2021 2:50 PM          LABS  Labs Reviewed   CBC WITH AUTO DIFFERENTIAL - Abnormal; Notable for the following components:       Result Value    RBC 4.49 (*)     Hemoglobin 13.8 (*)     Hematocrit 41.4 (*)     RDW-SD 45.3 (*)     All other components within normal limits   URINE RT REFLEX TO CULTURE - Abnormal; Notable for the following components:    Ketones, Urine TRACE (*)     All other components within normal limits   OSMOLALITY - Abnormal; Notable for the following components:    Osmolality Calc 274.7 (*)     All other components within normal limits   GLOMERULAR FILTRATION RATE, ESTIMATED - Abnormal; Notable for the following components:    Est, Glom Filt Rate 83 (*)     All other components within normal limits   POCT GLUCOSE - Abnormal; Notable for the following components:    POC Glucose 109 (*)     All other components within normal limits   COVID-19, RAPID   COMPREHENSIVE METABOLIC PANEL W/ REFLEX TO MG FOR LOW K   TROPONIN   LIPASE   ANION GAP       ED COURSE & MEDICAL DECISION MAKING    Pertinent Labs & Imaging studies reviewed. (See chart for details)  Clinically patient looks dehydrated. Patient was started on IV fluids and patient is feeling much better with that. Patient did not have any oral intake this morning that probably may be the reason of his syncope. Patient CT scan of the head, cervical spine showed no acute osseous abnormality. , chest x-ray. Labs are all reviewed discussed with the patient and the spouse in the room. Patient is feeling back to normal and is getting discharged home to follow-up with his primary care physician. He is also advised to come back to the emergency department if the symptoms get worse. Wero Weber FINAL IMPRESSION    1. Syncope and collapse    2.  Dehydration             Christine Coon MD  07/28/21 2115

## 2021-07-28 NOTE — ED TRIAGE NOTES
Patient comes to ED via EMS [facility]  about a half hour ago. Patient was talking to family members when he got diaphoretic and weak and LOC. Patient did not hit head. Patient states he has not ate at all today. Patient was talking to family at [address] when he got overwhelmed and got lightheaded. Patient denies pain at this time.

## 2021-07-29 LAB
EKG ATRIAL RATE: 68 BPM
EKG P AXIS: 25 DEGREES
EKG P-R INTERVAL: 242 MS
EKG Q-T INTERVAL: 370 MS
EKG QRS DURATION: 80 MS
EKG QTC CALCULATION (BAZETT): 393 MS
EKG R AXIS: -33 DEGREES
EKG T AXIS: 14 DEGREES
EKG VENTRICULAR RATE: 68 BPM

## 2021-08-17 ENCOUNTER — OFFICE VISIT (OUTPATIENT)
Dept: CARDIOLOGY CLINIC | Age: 71
End: 2021-08-17
Payer: MEDICARE

## 2021-08-17 VITALS
WEIGHT: 189 LBS | BODY MASS INDEX: 27.06 KG/M2 | DIASTOLIC BLOOD PRESSURE: 73 MMHG | HEART RATE: 83 BPM | HEIGHT: 70 IN | SYSTOLIC BLOOD PRESSURE: 127 MMHG

## 2021-08-17 DIAGNOSIS — Z87.898 HISTORY OF SYNCOPE: ICD-10-CM

## 2021-08-17 DIAGNOSIS — R94.31 ABNORMAL EKG: ICD-10-CM

## 2021-08-17 DIAGNOSIS — R06.02 SOB (SHORTNESS OF BREATH) ON EXERTION: ICD-10-CM

## 2021-08-17 DIAGNOSIS — R42 DIZZINESS ON STANDING: ICD-10-CM

## 2021-08-17 DIAGNOSIS — I10 ESSENTIAL HYPERTENSION: ICD-10-CM

## 2021-08-17 PROCEDURE — 4040F PNEUMOC VAC/ADMIN/RCVD: CPT | Performed by: INTERNAL MEDICINE

## 2021-08-17 PROCEDURE — 3017F COLORECTAL CA SCREEN DOC REV: CPT | Performed by: INTERNAL MEDICINE

## 2021-08-17 PROCEDURE — 1123F ACP DISCUSS/DSCN MKR DOCD: CPT | Performed by: INTERNAL MEDICINE

## 2021-08-17 PROCEDURE — 99204 OFFICE O/P NEW MOD 45 MIN: CPT | Performed by: INTERNAL MEDICINE

## 2021-08-17 PROCEDURE — 1036F TOBACCO NON-USER: CPT | Performed by: INTERNAL MEDICINE

## 2021-08-17 PROCEDURE — G8417 CALC BMI ABV UP PARAM F/U: HCPCS | Performed by: INTERNAL MEDICINE

## 2021-08-17 PROCEDURE — G8427 DOCREV CUR MEDS BY ELIG CLIN: HCPCS | Performed by: INTERNAL MEDICINE

## 2021-08-17 NOTE — PROGRESS NOTES
Chief Complaint   Patient presents with   [address] [facility] Patient     ref by PCP    Loss of Consciousness     New patient here for check up ref by PCP syncope -EKG changes    Denied cp or edema    Sob on exertion    Occasional dizziness on standing 1to 2 x/ week    NO  plapitation    Hx of syncope 3 weeks back  predrome of dizziness and syncope  No post drome  It was hot day    Drink 64 oz  Now and used to drink 32 oz    Nonsmoker    FHX  Father had CABG in his 76        Past Surgical History:   Procedure Laterality Date    ARM SURGERY Right 2021    EXCISION OF MELANOMA RIGHT UPPER ARM WITH SENTNEL LYMPH NODE BIOPSY RIGHT AXILLA performed by Isidro Haywood MD at [facility] [address] [facility], ESOPHAGUS     [facility]rea Nigel [facility] 1935      OTHER SURGICAL HISTORY  2017    robotic iliocecectomy    SKIN CANCER EXCISION  2021    melanoma removal Dr Charlie Lau- also removed lymph nodes        Allergies   Allergen Reactions    Penicillins      Unsure of reaction HAPPENED WHEN A WAS A TEENAGER OR EARLY TWENTY'S        Family History   Problem Relation Age of Onset    Heart Disease Father     Cancer Father         Social History     Socioeconomic History    Marital status:      Spouse name: Not on file    Number of children: Not on file    Years of education: Not on file    Highest education level: Not on file   Occupational History    Not on file   Tobacco Use    Smoking status: Former Smoker     Packs/day: 1.00     Years: 25.00     Pack years: 25.00     Types: Cigarettes     Start date: 1968     Quit date:      Years since quittin.6    Smokeless tobacco: Never Used   Vaping Use    Vaping Use: Former   Substance and Sexual Activity    Alcohol use:  Yes     Alcohol/week: 2.0 standard drinks     Types: 2 Standard drinks or equivalent per week     Comment: occasional    Drug use: Yes     Types: Marijuana     Comment: medical    Sexual activity: Not on file   Other Topics Concern    Not on file   Social History Narrative    Not on file     Social Determinants of Health     Financial Resource Strain:     Difficulty of Paying Living Expenses:    Food Insecurity:     Worried About Running Out of Food in the Last Year:     [address] in the Last Year:    Transportation Needs:     Lack of Transportation (Medical):  Lack of Transportation (Non-Medical):    Physical Activity:     Days of Exercise per Week:     Minutes of Exercise per Session:    Stress:     Feeling of Stress :    Social Connections:     Frequency of Communication with Friends and Family:     Frequency of Social Gatherings with Friends and Family:     Attends Episcopal Services:     Active Member of Clubs or Organizations:     Attends Club or Organization Meetings:     Marital Status:    Intimate Partner Violence:     Fear of Current or Ex-Partner:     Emotionally Abused:     Physically Abused:     Sexually Abused:        Current Outpatient Medications   Medication Sig Dispense Refill    NONFORMULARY ALJ VEGITABS DAILY PRN      NONFORMULARY FENUGREEK & THYME PRN      finasteride (PROSCAR) 5 MG tablet Take 1 tablet by mouth daily 90 tablet 3    amLODIPine (NORVASC) 10 MG tablet Take 10 mg by mouth daily      silodosin (RAPAFLO) 8 MG CAPS Take 1 capsule by mouth every evening 90 capsule 3    medical marijuana Take by mouth as needed. Gummys       No current facility-administered medications for this visit. Review of Systems -     General ROS: negative  Psychological ROS: negative  Hematological and Lymphatic ROS: No history of blood clots or bleeding disorder.    Respiratory ROS: no cough,  or wheezing, the rest see HPI  Cardiovascular ROS: See HPI  Gastrointestinal ROS: negative  Genito-Urinary ROS: no dysuria, trouble voiding, or hematuria  Musculoskeletal ROS: negative  Neurological ROS: no TIA or stroke symptoms  Dermatological ROS: negative      Blood pressure 127/73, pulse 83, height 5' 9.5\" (1.765 m), weight 189 lb (85.7 kg). Physical Examination:    General appearance - alert, well appearing, and in no distress  HEENT- Pink conjunctiva  , Non-icteri sclera,PERRLA  Mental status - alert, oriented to person, place, and time  Neck - supple, no significant adenopathy, no JVD, or carotid bruits  Chest - clear to auscultation, no wheezes, rales or rhonchi, symmetric air entry  Heart - normal rate, regular rhythm, normal S1, S2, no murmurs, rubs, clicks or gallops  Abdomen - soft, nontender, nondistended, no masses or organomegaly  NADEEM- no CVA or flank tenderness, no suprapubic tenderness  Neurological - alert, oriented, normal speech, no focal findings or movement disorder noted  Musculoskeletal/limbs - no joint tenderness, deformity or swelling   - peripheral pulses normal, no pedal edema, no clubbing or cyanosis  Skin - normal coloration and turgor, no rashes, no suspicious skin lesions noted  Psych- appropriate mood and affect    Lab  No results for input(s): CKTOTAL, CKMB, CKMBINDEX, TROPONINI in the last 72 hours.   CBC:   Lab Results   Component Value Date    WBC 5.3 07/28/2021    RBC 4.49 07/28/2021    RBC 4.88 11/01/2019    HGB 13.8 07/28/2021    HCT 41.4 07/28/2021    MCV 92.2 07/28/2021    MCH 30.7 07/28/2021    MCHC 33.3 07/28/2021    RDW 14.1 11/01/2019     07/28/2021    MPV 11.1 07/28/2021     BMP:    Lab Results   Component Value Date     07/28/2021    K 4.2 07/28/2021     07/28/2021    CO2 24 07/28/2021    BUN 19 07/28/2021    LABALBU 4.2 07/28/2021    CREATININE 0.9 07/28/2021    CALCIUM 9.5 07/28/2021    LABGLOM 83 07/28/2021    GLUCOSE 107 07/28/2021    GLUCOSE 93 02/18/2021     Hepatic Function Panel:    Lab Results   Component Value Date    ALKPHOS 87 07/28/2021    ALT 13 07/28/2021    AST 16 07/28/2021    PROT 6.9 07/28/2021    BILITOT 0.5 07/28/2021    BILITOT NEGATIVE 05/20/2019    BILIDIR <0.2 03/30/2021    LABALBU 4.2 07/28/2021     Magnesium:    Lab Results   Component Value Date    MG 2.1 11/01/2019     Warfarin PT/INR:  No components found for: Corona Hunter  HgBA1c:    Lab Results   Component Value Date    LABA1C 5.3 11/01/2019     FLP:    Lab Results   Component Value Date    TRIG 67 11/01/2019    HDL 81 11/01/2019    LDLCALC 102 11/01/2019    LABVLDL 13 11/01/2019     TSH:    Lab Results   Component Value Date    TSH 2.82 08/06/2019   Sinus rhythm with 1st degree A-V block with occasional Premature ventricular complexes Minimal voltage criteria for LVH, may be normal variant Borderline ECG When compared with ECG of 28-SEP-1995 11:22, Premature ventricular complexes are now Present [place] interval has increased Confirmed by Christie Smith MD, Aga Sam (33    ekg 7/28/21  NSR,prwp. 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 SOB (shortness of breath) on exertion  NM MYOCARDIAL SPECT REST EXERCISE OR RX    ECHO Complete 2D W Doppler W Color    Stress test, lexiscan   2. Dizziness on standing  ECHO Complete 2D W Doppler W Color    Stress test, lexiscan   3. History of syncope  ECHO Complete 2D W Doppler W Color    Stress test, lexiscan   4. Abnormal EKG  Stress test, lexiscan   5. Essential hypertension  Stress test, lexiscan     FHX of CAD      Plan     Meds reviewed    Dizziness  Hx of syncope  Sob  Risk factors  Abn ekg  Echo  Rich nuc   Walk with Cane  Had knee surgery    Cont hydration as dizziness better    Hypertension, on medical treatment. Seems to be under good control. Patient is compliant with medical treatment.      D/w the pat the plan of care      Mani Couch, [facility]

## 2021-08-31 ENCOUNTER — HOSPITAL ENCOUNTER (OUTPATIENT)
Dept: NON INVASIVE DIAGNOSTICS | Age: 71
Discharge: HOME | End: 2021-08-31
Payer: MEDICARE

## 2021-08-31 DIAGNOSIS — I10 ESSENTIAL HYPERTENSION: ICD-10-CM

## 2021-08-31 DIAGNOSIS — Z87.898 HISTORY OF SYNCOPE: ICD-10-CM

## 2021-08-31 DIAGNOSIS — R06.02 SOB (SHORTNESS OF BREATH) ON EXERTION: ICD-10-CM

## 2021-08-31 DIAGNOSIS — R94.31 ABNORMAL EKG: ICD-10-CM

## 2021-08-31 DIAGNOSIS — R42 DIZZINESS ON STANDING: ICD-10-CM

## 2021-08-31 LAB
LV EF: 60 %
LVEF MODALITY: NORMAL

## 2021-08-31 PROCEDURE — 93306 TTE W/DOPPLER COMPLETE: CPT

## 2021-08-31 PROCEDURE — A9500 TC99M SESTAMIBI: HCPCS | Performed by: INTERNAL MEDICINE

## 2021-08-31 PROCEDURE — 6360000002 HC RX W HCPCS

## 2021-08-31 PROCEDURE — 78452 HT MUSCLE IMAGE SPECT MULT: CPT

## 2021-08-31 PROCEDURE — 3430000000 HC RX DIAGNOSTIC RADIOPHARMACEUTICAL: Performed by: INTERNAL MEDICINE

## 2021-08-31 PROCEDURE — 93017 CV STRESS TEST TRACING ONLY: CPT

## 2021-08-31 RX ADMIN — Medication 8.5 MILLICURIE: at 10:45

## 2021-08-31 RX ADMIN — Medication 28.6 MILLICURIE: at 11:45

## 2021-09-14 ENCOUNTER — OFFICE VISIT (OUTPATIENT)
Dept: CARDIOLOGY CLINIC | Age: 71
End: 2021-09-14
Payer: MEDICARE

## 2021-09-14 VITALS
BODY MASS INDEX: 26.92 KG/M2 | SYSTOLIC BLOOD PRESSURE: 146 MMHG | HEIGHT: 70 IN | DIASTOLIC BLOOD PRESSURE: 74 MMHG | WEIGHT: 188 LBS | HEART RATE: 80 BPM

## 2021-09-14 DIAGNOSIS — R42 DIZZINESS ON STANDING: Primary | ICD-10-CM

## 2021-09-14 DIAGNOSIS — I10 ESSENTIAL HYPERTENSION: ICD-10-CM

## 2021-09-14 DIAGNOSIS — Z87.898 HISTORY OF SYNCOPE: ICD-10-CM

## 2021-09-14 DIAGNOSIS — R94.31 ABNORMAL EKG: ICD-10-CM

## 2021-09-14 PROCEDURE — 3017F COLORECTAL CA SCREEN DOC REV: CPT | Performed by: INTERNAL MEDICINE

## 2021-09-14 PROCEDURE — G8427 DOCREV CUR MEDS BY ELIG CLIN: HCPCS | Performed by: INTERNAL MEDICINE

## 2021-09-14 PROCEDURE — 1123F ACP DISCUSS/DSCN MKR DOCD: CPT | Performed by: INTERNAL MEDICINE

## 2021-09-14 PROCEDURE — G8417 CALC BMI ABV UP PARAM F/U: HCPCS | Performed by: INTERNAL MEDICINE

## 2021-09-14 PROCEDURE — 99214 OFFICE O/P EST MOD 30 MIN: CPT | Performed by: INTERNAL MEDICINE

## 2021-09-14 PROCEDURE — 1036F TOBACCO NON-USER: CPT | Performed by: INTERNAL MEDICINE

## 2021-09-14 PROCEDURE — 4040F PNEUMOC VAC/ADMIN/RCVD: CPT | Performed by: INTERNAL MEDICINE

## 2021-09-14 NOTE — PROGRESS NOTES
No chief complaint on file. Originally  patient here for check up ref by PCP syncope -EKG changes    Pt is here for: f/u 4 wks    Last EKG was done on: 2021    Dizziness  Improved  Markedly after hydration- once in the last 1 month    Denied cp or edema or palpitation    Sob on exertion    NO  plapitation    Hx of syncope- last and fisrt 2021  predrome of dizziness and syncope  No post drome  It was hot day    Drink 64 oz  Now and used to drink 32 oz    Nonsmoker    [id number]  Father had CABG in his 76        Past Surgical History:   Procedure Laterality Date    ARM SURGERY Right 2021    EXCISION OF MELANOMA RIGHT UPPER ARM WITH SENTNEL LYMPH NODE BIOPSY RIGHT AXILLA performed by Hu Ruby MD at [facility] [address] [facility]      [address] [facility]      OTHER SURGICAL HISTORY  2017    robotic iliocecectomy    SKIN CANCER EXCISION  2021    melanoma removal Dr Guillermo Guillen- also removed lymph nodes        Allergies   Allergen Reactions    Penicillins      Unsure of reaction HAPPENED WHEN A WAS A TEENAGER OR EARLY TWENTY'S        Family History   Problem Relation Age of Onset    Heart Disease Father     Cancer Father         Social History     Socioeconomic History    Marital status:      Spouse name: Not on file    Number of children: Not on file    Years of education: Not on file    Highest education level: Not on file   Occupational History    Not on file   Tobacco Use    Smoking status: Former Smoker     Packs/day: 1.00     Years: 25.00     Pack years: 25.00     Types: Cigarettes     Start date: 1968     Quit date:      Years since quittin.7    Smokeless tobacco: Never Used   Vaping Use    Vaping Use: Former   Substance and Sexual Activity    Alcohol use: Yes     Alcohol/week: 2.0 standard drinks     Types: 2 Standard drinks or equivalent per week     Comment: occasional    Drug use:  Yes Types: Marijuana     Comment: medical    Sexual activity: Not on file   Other Topics Concern    Not on file   Social History Narrative    Not on file     Social Determinants of Health     Financial Resource Strain:     Difficulty of Paying Living Expenses:    Food Insecurity:     Worried About Running Out of Food in the Last Year:     [address] in the Last Year:    Transportation Needs:     Lack of Transportation (Medical):  Lack of Transportation (Non-Medical):    Physical Activity:     Days of Exercise per Week:     Minutes of Exercise per Session:    Stress:     Feeling of Stress :    Social Connections:     Frequency of Communication with Friends and Family:     Frequency of Social Gatherings with Friends and Family:     Attends Hindu Services:     Active Member of Clubs or Organizations:     Attends Club or Organization Meetings:     Marital Status:    Intimate Partner Violence:     Fear of Current or Ex-Partner:     Emotionally Abused:     Physically Abused:     Sexually Abused:        Current Outpatient Medications   Medication Sig Dispense Refill    NONFORMULARY ALJ VEGITABS DAILY PRN      NONFORMULARY FENUGREEK & THYME PRN      amLODIPine (NORVASC) 10 MG tablet Take 10 mg by mouth daily      medical marijuana Take by mouth as needed. Gummys      finasteride (PROSCAR) 5 MG tablet Take 1 tablet by mouth daily 90 tablet 3    silodosin (RAPAFLO) 8 MG CAPS Take 1 capsule by mouth every evening 90 capsule 3     No current facility-administered medications for this visit. Review of Systems -     General ROS: negative  Psychological ROS: negative  Hematological and Lymphatic ROS: No history of blood clots or bleeding disorder.    Respiratory ROS: no cough,  or wheezing, the rest see HPI  Cardiovascular ROS: See HPI  Gastrointestinal ROS: negative  Genito-Urinary ROS: no dysuria, trouble voiding, or hematuria  Musculoskeletal ROS: negative  Neurological ROS: no TIA or stroke symptoms  Dermatological ROS: negative      Blood pressure (!) 146/74, pulse 80, height 5' 9.5\" (1.765 m), weight 188 lb (85.3 kg). Physical Examination:    General appearance - alert, well appearing, and in no distress  HEENT- Pink conjunctiva  , Non-icteri sclera,PERRLA  Mental status - alert, oriented to person, place, and time  Neck - supple, no significant adenopathy, no JVD, or carotid bruits  Chest - clear to auscultation, no wheezes, rales or rhonchi, symmetric air entry  Heart - normal rate, regular rhythm, normal S1, S2, no murmurs, rubs, clicks or gallops  Abdomen - soft, nontender, nondistended, no masses or organomegaly  NADEEM- no CVA or flank tenderness, no suprapubic tenderness  Neurological - alert, oriented, normal speech, no focal findings or movement disorder noted  Musculoskeletal/limbs - no joint tenderness, deformity or swelling   - peripheral pulses normal, no pedal edema, no clubbing or cyanosis  Skin - normal coloration and turgor, no rashes, no suspicious skin lesions noted  Psych- appropriate mood and affect    Lab  No results for input(s): CKTOTAL, CKMB, CKMBINDEX, TROPONINI in the last 72 hours.   CBC:   Lab Results   Component Value Date    WBC 5.3 07/28/2021    RBC 4.49 07/28/2021    RBC 4.88 11/01/2019    HGB 13.8 07/28/2021    HCT 41.4 07/28/2021    MCV 92.2 07/28/2021    MCH 30.7 07/28/2021    MCHC 33.3 07/28/2021    RDW 14.1 11/01/2019     07/28/2021    MPV 11.1 07/28/2021     BMP:    Lab Results   Component Value Date     07/28/2021    K 4.2 07/28/2021     07/28/2021    CO2 24 07/28/2021    BUN 19 07/28/2021    LABALBU 4.2 07/28/2021    CREATININE 0.9 07/28/2021    CALCIUM 9.5 07/28/2021    LABGLOM 83 07/28/2021    GLUCOSE 107 07/28/2021    GLUCOSE 93 02/18/2021     Hepatic Function Panel:    Lab Results   Component Value Date    ALKPHOS 87 07/28/2021    ALT 13 07/28/2021    AST 16 07/28/2021    PROT 6.9 07/28/2021    BILITOT 0.5 07/28/2021    BILITOT NEGATIVE 05/20/2019    BILIDIR <0.2 03/30/2021    LABALBU 4.2 07/28/2021     Magnesium:    Lab Results   Component Value Date    MG 2.1 11/01/2019     Warfarin PT/INR:  No components found for: PTPATWAR, PTINRWAR  HgBA1c:    Lab Results   Component Value Date    LABA1C 5.3 11/01/2019     FLP:    Lab Results   Component Value Date    TRIG 67 11/01/2019    HDL 81 11/01/2019    LDLCALC 102 11/01/2019    LABVLDL 13 11/01/2019     TSH:    Lab Results   Component Value Date    TSH 2.82 08/06/2019     Echo   Conclusions    Summary   Normal left ventricle size and systolic function. Ejection fraction was   estimated at 60 %. There were no regional left ventricular wall motion   abnormalities and wall thickness was within normal limits. The left atrium is Mildly dilated. Signature    ----------------------------------------------------------------   Electronically signed by Luci Lopez MD (Interpreting   physician) on 08/31/2021 at 06:07 PM   ---------------------------------------------------------------      -    Conclusions      Summary   Lexiscan EKG stress test is not suggestive for ischemia. The nuclear images is not suggestive for myocardial ischemia. Signatures      ----------------------------------------------------------------   Electronically signed by Luci Lopez MD (Interpreting   Cardiologist) on 08/31/2021 at 18:50   ----------------------------------------------------------------      Sinus rhythm with 1st degree A-V block with occasional Premature ventricular complexes Minimal voltage criteria for LVH, may be normal variant Borderline ECG When compared with ECG of 28-SEP-1995 11:22, Premature ventricular complexes are now Present [place] interval has increased Confirmed by Wilton Galeano MD, Noble Doss (33    ekg 7/28/21  NSR,prwp. 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 Dizziness on standing     2. History of syncope     3. Essential hypertension     4.  Abnormal

## 2021-11-22 ENCOUNTER — HOSPITAL ENCOUNTER (OUTPATIENT)
Age: 71
Discharge: HOME | End: 2021-11-22
Payer: MEDICARE

## 2021-11-22 ENCOUNTER — HOSPITAL ENCOUNTER (OUTPATIENT)
Dept: GENERAL RADIOLOGY | Age: 71
Discharge: HOME | End: 2021-11-22
Payer: MEDICARE

## 2021-11-22 DIAGNOSIS — C43.9 MALIGNANT MELANOMA, UNSPECIFIED SITE (HCC): ICD-10-CM

## 2021-11-22 LAB
ALBUMIN SERPL-MCNC: 4.8 G/DL (ref 3.5–5.1)
ALP BLD-CCNC: 106 U/L (ref 38–126)
ALT SERPL-CCNC: 11 U/L (ref 11–66)
ANION GAP SERPL CALCULATED.3IONS-SCNC: 11 MEQ/L (ref 8–16)
AST SERPL-CCNC: 18 U/L (ref 5–40)
BILIRUB SERPL-MCNC: 0.4 MG/DL (ref 0.3–1.2)
BILIRUBIN DIRECT: < 0.2 MG/DL (ref 0–0.3)
BUN BLDV-MCNC: 17 MG/DL (ref 7–22)
C-REACTIVE PROTEIN: < 0.3 MG/DL (ref 0–1)
CALCIUM SERPL-MCNC: 10.1 MG/DL (ref 8.5–10.5)
CHLORIDE BLD-SCNC: 102 MEQ/L (ref 98–111)
CO2: 27 MEQ/L (ref 23–33)
CREAT SERPL-MCNC: 0.8 MG/DL (ref 0.4–1.2)
ERYTHROCYTE [DISTWIDTH] IN BLOOD BY AUTOMATED COUNT: 13.4 % (ref 11.5–14.5)
ERYTHROCYTE [DISTWIDTH] IN BLOOD BY AUTOMATED COUNT: 47.3 FL (ref 35–45)
GFR SERPL CREATININE-BSD FRML MDRD: > 90 ML/MIN/1.73M2
GLUCOSE BLD-MCNC: 103 MG/DL (ref 70–108)
HCT VFR BLD CALC: 46.7 % (ref 42–52)
HEMOGLOBIN: 15 GM/DL (ref 14–18)
MCH RBC QN AUTO: 30.6 PG (ref 26–33)
MCHC RBC AUTO-ENTMCNC: 32.1 GM/DL (ref 32.2–35.5)
MCV RBC AUTO: 95.3 FL (ref 80–94)
PLATELET # BLD: 290 THOU/MM3 (ref 130–400)
PMV BLD AUTO: 10.9 FL (ref 9.4–12.4)
POTASSIUM SERPL-SCNC: 5.4 MEQ/L (ref 3.5–5.2)
RBC # BLD: 4.9 MILL/MM3 (ref 4.7–6.1)
SODIUM BLD-SCNC: 140 MEQ/L (ref 135–145)
TOTAL PROTEIN: 7.7 G/DL (ref 6.1–8)
WBC # BLD: 4.9 THOU/MM3 (ref 4.8–10.8)

## 2021-11-22 PROCEDURE — 80053 COMPREHEN METABOLIC PANEL: CPT

## 2021-11-22 PROCEDURE — 85027 COMPLETE CBC AUTOMATED: CPT

## 2021-11-22 PROCEDURE — 36415 COLL VENOUS BLD VENIPUNCTURE: CPT

## 2021-11-22 PROCEDURE — 71046 X-RAY EXAM CHEST 2 VIEWS: CPT

## 2021-11-22 PROCEDURE — 86140 C-REACTIVE PROTEIN: CPT

## 2021-11-22 PROCEDURE — 82248 BILIRUBIN DIRECT: CPT

## 2022-01-18 ENCOUNTER — PATIENT MESSAGE (OUTPATIENT)
Dept: UROLOGY | Age: 72
End: 2022-01-18

## 2022-01-18 ENCOUNTER — NURSE ONLY (OUTPATIENT)
Dept: LAB | Age: 72
End: 2022-01-18

## 2022-01-18 NOTE — TELEPHONE ENCOUNTER
From: Damir Terrazas  To: Dr. Berman Chinquapin: 1/18/2022 3:50 PM EST  Subject: Need refill of Silodosin 8mg    I am out of this prescription and no refills.

## 2022-01-19 LAB — PROSTATE SPECIFIC ANTIGEN: 0.86 NG/ML (ref 0–1)

## 2022-01-19 RX ORDER — SILODOSIN 8 MG/1
8 CAPSULE ORAL EVERY EVENING
Qty: 90 CAPSULE | Refills: 3 | Status: SHIPPED | OUTPATIENT
Start: 2022-01-19 | End: 2022-06-08 | Stop reason: SDUPTHER

## 2022-03-22 ENCOUNTER — OFFICE VISIT (OUTPATIENT)
Dept: CARDIOLOGY CLINIC | Age: 72
End: 2022-03-22
Payer: MEDICARE

## 2022-03-22 VITALS
HEIGHT: 70 IN | SYSTOLIC BLOOD PRESSURE: 121 MMHG | WEIGHT: 195 LBS | HEART RATE: 102 BPM | DIASTOLIC BLOOD PRESSURE: 81 MMHG | BODY MASS INDEX: 27.92 KG/M2

## 2022-03-22 DIAGNOSIS — I10 ESSENTIAL HYPERTENSION: ICD-10-CM

## 2022-03-22 DIAGNOSIS — R06.02 SOB (SHORTNESS OF BREATH) ON EXERTION: ICD-10-CM

## 2022-03-22 DIAGNOSIS — Z87.898 HISTORY OF SYNCOPE: ICD-10-CM

## 2022-03-22 DIAGNOSIS — R42 DIZZINESS ON STANDING: Primary | ICD-10-CM

## 2022-03-22 DIAGNOSIS — R94.31 ABNORMAL EKG: ICD-10-CM

## 2022-03-22 PROCEDURE — G8484 FLU IMMUNIZE NO ADMIN: HCPCS | Performed by: INTERNAL MEDICINE

## 2022-03-22 PROCEDURE — 99213 OFFICE O/P EST LOW 20 MIN: CPT | Performed by: INTERNAL MEDICINE

## 2022-03-22 PROCEDURE — 1123F ACP DISCUSS/DSCN MKR DOCD: CPT | Performed by: INTERNAL MEDICINE

## 2022-03-22 PROCEDURE — 4040F PNEUMOC VAC/ADMIN/RCVD: CPT | Performed by: INTERNAL MEDICINE

## 2022-03-22 PROCEDURE — G8427 DOCREV CUR MEDS BY ELIG CLIN: HCPCS | Performed by: INTERNAL MEDICINE

## 2022-03-22 PROCEDURE — 1036F TOBACCO NON-USER: CPT | Performed by: INTERNAL MEDICINE

## 2022-03-22 PROCEDURE — G8417 CALC BMI ABV UP PARAM F/U: HCPCS | Performed by: INTERNAL MEDICINE

## 2022-03-22 PROCEDURE — 3017F COLORECTAL CA SCREEN DOC REV: CPT | Performed by: INTERNAL MEDICINE

## 2022-03-22 NOTE — PROGRESS NOTES
Chief Complaint   Patient presents with    6 Month Follow-Up    Hypertension     Originally  patient here for check up ref by PCP syncope -EKG changes      6 month follow up. EKG done 2021. Dizziness  Improved  Markedly after hydration- once in the last 1 month    Denied cp or edema or palpitation    Sob on exertion- better    NO  plapitation    Hx of syncope- last and   predrome of dizziness and syncope  No post drome  It was hot day    Drink 64 oz  Now and used to drink 32 oz    Nonsmoker    FHX  Father had CABG in his 76        Past Surgical History:   Procedure Laterality Date    ARM SURGERY Right 2021    EXCISION OF MELANOMA RIGHT UPPER ARM WITH SENTNEL LYMPH NODE BIOPSY RIGHT AXILLA performed by Peggy Sacks, MD at [facility] [address] [facility]      [address] [place] [address]      OTHER SURGICAL HISTORY  2017    robotic iliocecectomy    SKIN CANCER EXCISION  2021    melanoma removal Dr Rukhsana Verduzco- also removed lymph nodes        Allergies   Allergen Reactions    Penicillins      Unsure of reaction HAPPENED WHEN A WAS A TEENAGER OR EARLY TWENTY'S        Family History   Problem Relation Age of Onset    Heart Disease Father     Cancer Father         Social History     Socioeconomic History    Marital status:      Spouse name: Not on file    Number of children: Not on file    Years of education: Not on file    Highest education level: Not on file   Occupational History    Not on file   Tobacco Use    Smoking status: Former Smoker     Packs/day: 1.00     Years: 25.00     Pack years: 25.00     Types: Cigarettes     Start date: 1968     Quit date:      Years since quittin.2    Smokeless tobacco: Never Used   Vaping Use    Vaping Use: Former   Substance and Sexual Activity    Alcohol use:  Yes     Alcohol/week: 2.0 standard drinks     Types: 2 Standard drinks or equivalent per week Comment: occasional    Drug use: Yes     Types: Marijuana Dolph [place])     Comment: medical    Sexual activity: Not on file   Other Topics Concern    Not on file   Social History Narrative    Not on file     Social Determinants of Health     Financial Resource Strain:     Difficulty of Paying Living Expenses: Not on file   Food Insecurity:     Worried About Running Out of Food in the Last Year: Not on file    Diana of Food in the Last Year: Not on file   Transportation Needs:     Lack of Transportation (Medical): Not on file    Lack of Transportation (Non-Medical): Not on file   Physical Activity:     Days of Exercise per Week: Not on file    Minutes of Exercise per Session: Not on file   Stress:     Feeling of Stress : Not on file   Social Connections:     Frequency of Communication with Friends and Family: Not on file    Frequency of Social Gatherings with Friends and Family: Not on file    Attends Restorationist Services: Not on file    Active Member of [address] [place] [address] or Organizations: Not on file    Attends Club or Organization Meetings: Not on file    Marital Status: Not on file   Intimate Partner Violence:     Fear of Current or Ex-Partner: Not on file    Emotionally Abused: Not on file    Physically Abused: Not on file    Sexually Abused: Not on file   Housing Stability:     Unable to Pay for Housing in the Last Year: Not on file    Number of [place] in the Last Year: Not on file    Unstable Housing in the Last Year: Not on file       Current Outpatient Medications   Medication Sig Dispense Refill    silodosin (RAPAFLO) 8 MG CAPS Take 1 capsule by mouth every evening 90 capsule 3    NONFORMULARY ALJ VEGITABS DAILY PRN      NONFORMULARY FENUGREEK & THYME PRN      finasteride (PROSCAR) 5 MG tablet Take 1 tablet by mouth daily 90 tablet 3    amLODIPine (NORVASC) 10 MG tablet Take 10 mg by mouth daily      medical marijuana Take by mouth as needed.  Gummys       No current facility-administered K 5.4 11/22/2021    K 4.2 07/28/2021     11/22/2021    CO2 27 11/22/2021    BUN 17 11/22/2021    LABALBU 4.8 11/22/2021    CREATININE 0.8 11/22/2021    CALCIUM 10.1 11/22/2021    LABGLOM >90 11/22/2021    GLUCOSE 103 11/22/2021    GLUCOSE 93 02/18/2021     Hepatic Function Panel:    Lab Results   Component Value Date    ALKPHOS 106 11/22/2021    ALT 11 11/22/2021    AST 18 11/22/2021    PROT 7.7 11/22/2021    BILITOT 0.4 11/22/2021    BILITOT NEGATIVE 05/20/2019    BILIDIR <0.2 11/22/2021    LABALBU 4.8 11/22/2021     Magnesium:    Lab Results   Component Value Date    MG 2.1 11/01/2019     Warfarin PT/INR:  No components found for: PTPATWAR, PTINRWAR  HgBA1c:    Lab Results   Component Value Date    LABA1C 5.3 11/01/2019     FLP:    Lab Results   Component Value Date    TRIG 67 11/01/2019    HDL 81 11/01/2019    LDLCALC 102 11/01/2019    LABVLDL 13 11/01/2019     TSH:    Lab Results   Component Value Date    TSH 2.82 08/06/2019     Echo   Conclusions    Summary   Normal left ventricle size and systolic function. Ejection fraction was   estimated at 60 %. There were no regional left ventricular wall motion   abnormalities and wall thickness was within normal limits. The left atrium is Mildly dilated. Signature    ----------------------------------------------------------------   Electronically signed by Dudley Meyers MD (Interpreting   physician) on 08/31/2021 at 06:07 PM   ---------------------------------------------------------------      -    Conclusions      Summary   Lexiscan EKG stress test is not suggestive for ischemia. The nuclear images is not suggestive for myocardial ischemia.       Signatures      ----------------------------------------------------------------   Electronically signed by Dudley Meyers MD (Interpreting   Cardiologist) on 08/31/2021 at 18:50   ----------------------------------------------------------------      Sinus rhythm with 1st degree A-V block with occasional Premature ventricular complexes Minimal voltage criteria for LVH, may be normal variant Borderline ECG When compared with ECG of 28-SEP-1995 11:22, Premature ventricular complexes are now Present [place] interval has increased Confirmed by Guillermo Vega MD, Mari Babcock (33    ekg 7/28/21  NSR,prwp. 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 Dizziness on standing     2. Essential hypertension     3. Abnormal EKG     4. History of syncope     5. SOB (shortness of breath) on exertion       FHX of CAD      Plan     The current  Labs and Meds reviewed    Dizziness on standing -better  Hx of syncope once  Risk factors  Abn ekg  Echo and Rich nuc - WNL  Walk with Cane  Had knee surgery    Cont hydration as dizziness better    Hypertension, on medical treatment. Seems to be under good control. Patient is compliant with medical treatment. Home sbp 120 to 140     D/w the pat the plan of care    Pat is overall Stable and doing better    Discussed use, benefit, and side effects of prescribed medications. All patient questions answered. Pt voiced understanding. Instructed to continue current medications, diet and exercise. Continue risk factor modification and medical management. Patient agreed with treatment plan. Follow up as directed.       RTC in 6 months      [facility]

## 2022-05-31 ENCOUNTER — HOSPITAL ENCOUNTER (OUTPATIENT)
Dept: GENERAL RADIOLOGY | Age: 72
Discharge: HOME | End: 2022-05-31
Payer: MEDICARE

## 2022-05-31 ENCOUNTER — HOSPITAL ENCOUNTER (OUTPATIENT)
Age: 72
Discharge: HOME | End: 2022-05-31
Payer: MEDICARE

## 2022-05-31 DIAGNOSIS — Z87.891 PERSONAL HISTORY OF TOBACCO USE, PRESENTING HAZARDS TO HEALTH: ICD-10-CM

## 2022-05-31 DIAGNOSIS — C43.61 MALIGNANT MELANOMA OF RIGHT UPPER EXTREMITY INCLUDING SHOULDER (HCC): ICD-10-CM

## 2022-05-31 DIAGNOSIS — E55.9 VITAMIN D DEFICIENCY: ICD-10-CM

## 2022-05-31 LAB
ALBUMIN SERPL-MCNC: 4.8 G/DL (ref 3.5–5.1)
ALP BLD-CCNC: 96 U/L (ref 38–126)
ALT SERPL-CCNC: 13 U/L (ref 11–66)
ANION GAP SERPL CALCULATED.3IONS-SCNC: 12 MEQ/L (ref 8–16)
AST SERPL-CCNC: 18 U/L (ref 5–40)
BASOPHILS # BLD: 0.8 %
BASOPHILS ABSOLUTE: 0.1 THOU/MM3 (ref 0–0.1)
BILIRUB SERPL-MCNC: 0.7 MG/DL (ref 0.3–1.2)
BILIRUBIN DIRECT: < 0.2 MG/DL (ref 0–0.3)
BUN BLDV-MCNC: 13 MG/DL (ref 7–22)
C-REACTIVE PROTEIN: < 0.3 MG/DL (ref 0–1)
CALCIUM SERPL-MCNC: 9.6 MG/DL (ref 8.5–10.5)
CEA: 0.9 NG/ML (ref 0–5)
CHLORIDE BLD-SCNC: 100 MEQ/L (ref 98–111)
CO2: 25 MEQ/L (ref 23–33)
CREAT SERPL-MCNC: 0.7 MG/DL (ref 0.4–1.2)
EOSINOPHIL # BLD: 1.4 %
EOSINOPHILS ABSOLUTE: 0.1 THOU/MM3 (ref 0–0.4)
ERYTHROCYTE [DISTWIDTH] IN BLOOD BY AUTOMATED COUNT: 13.3 % (ref 11.5–14.5)
ERYTHROCYTE [DISTWIDTH] IN BLOOD BY AUTOMATED COUNT: 45.1 FL (ref 35–45)
GFR SERPL CREATININE-BSD FRML MDRD: > 90 ML/MIN/1.73M2
GLUCOSE BLD-MCNC: 98 MG/DL (ref 70–108)
HCT VFR BLD CALC: 44.2 % (ref 42–52)
HEMOGLOBIN: 14.5 GM/DL (ref 14–18)
IMMATURE GRANS (ABS): 0.02 THOU/MM3 (ref 0–0.07)
IMMATURE GRANULOCYTES: 0.3 %
LYMPHOCYTES # BLD: 21.6 %
LYMPHOCYTES ABSOLUTE: 1.4 THOU/MM3 (ref 1–4.8)
MCH RBC QN AUTO: 30.5 PG (ref 26–33)
MCHC RBC AUTO-ENTMCNC: 32.8 GM/DL (ref 32.2–35.5)
MCV RBC AUTO: 92.9 FL (ref 80–94)
MONOCYTES # BLD: 6.8 %
MONOCYTES ABSOLUTE: 0.4 THOU/MM3 (ref 0.4–1.3)
NUCLEATED RED BLOOD CELLS: 0 /100 WBC
PLATELET # BLD: 265 THOU/MM3 (ref 130–400)
PMV BLD AUTO: 11 FL (ref 9.4–12.4)
POTASSIUM SERPL-SCNC: 3.5 MEQ/L (ref 3.5–5.2)
RBC # BLD: 4.76 MILL/MM3 (ref 4.7–6.1)
SEG NEUTROPHILS: 69.1 %
SEGMENTED NEUTROPHILS ABSOLUTE COUNT: 4.5 THOU/MM3 (ref 1.8–7.7)
SODIUM BLD-SCNC: 137 MEQ/L (ref 135–145)
TOTAL PROTEIN: 7.6 G/DL (ref 6.1–8)
VITAMIN D 25-HYDROXY: 45 NG/ML (ref 30–100)
WBC # BLD: 6.5 THOU/MM3 (ref 4.8–10.8)

## 2022-05-31 PROCEDURE — 71046 X-RAY EXAM CHEST 2 VIEWS: CPT

## 2022-05-31 PROCEDURE — 82248 BILIRUBIN DIRECT: CPT

## 2022-05-31 PROCEDURE — 80053 COMPREHEN METABOLIC PANEL: CPT

## 2022-05-31 PROCEDURE — 82378 CARCINOEMBRYONIC ANTIGEN: CPT

## 2022-05-31 PROCEDURE — 86140 C-REACTIVE PROTEIN: CPT

## 2022-05-31 PROCEDURE — 82306 VITAMIN D 25 HYDROXY: CPT

## 2022-05-31 PROCEDURE — 85025 COMPLETE CBC W/AUTO DIFF WBC: CPT

## 2022-05-31 PROCEDURE — 36415 COLL VENOUS BLD VENIPUNCTURE: CPT

## 2022-06-08 ENCOUNTER — OFFICE VISIT (OUTPATIENT)
Dept: UROLOGY | Age: 72
End: 2022-06-08
Payer: MEDICARE

## 2022-06-08 VITALS
WEIGHT: 188 LBS | HEIGHT: 70 IN | BODY MASS INDEX: 26.92 KG/M2 | DIASTOLIC BLOOD PRESSURE: 70 MMHG | SYSTOLIC BLOOD PRESSURE: 128 MMHG

## 2022-06-08 DIAGNOSIS — N40.1 BENIGN LOCALIZED PROSTATIC HYPERPLASIA WITH LOWER URINARY TRACT SYMPTOMS (LUTS): Primary | ICD-10-CM

## 2022-06-08 LAB
BILIRUBIN URINE: NEGATIVE
BLOOD URINE, POC: NEGATIVE
CHARACTER, URINE: CLEAR
COLOR, URINE: YELLOW
GLUCOSE URINE: NEGATIVE MG/DL
KETONES, URINE: NEGATIVE
LEUKOCYTE CLUMPS, URINE: NEGATIVE
NITRITE, URINE: NEGATIVE
PH, URINE: 7 (ref 5–9)
POST VOID RESIDUAL (PVR): 214 ML
PROTEIN, URINE: NEGATIVE MG/DL
SPECIFIC GRAVITY, URINE: 1.01 (ref 1–1.03)
UROBILINOGEN, URINE: 0.2 EU/DL (ref 0–1)

## 2022-06-08 PROCEDURE — 81003 URINALYSIS AUTO W/O SCOPE: CPT | Performed by: NURSE PRACTITIONER

## 2022-06-08 PROCEDURE — 99214 OFFICE O/P EST MOD 30 MIN: CPT | Performed by: NURSE PRACTITIONER

## 2022-06-08 PROCEDURE — G8417 CALC BMI ABV UP PARAM F/U: HCPCS | Performed by: NURSE PRACTITIONER

## 2022-06-08 PROCEDURE — 51798 US URINE CAPACITY MEASURE: CPT | Performed by: NURSE PRACTITIONER

## 2022-06-08 PROCEDURE — 3017F COLORECTAL CA SCREEN DOC REV: CPT | Performed by: NURSE PRACTITIONER

## 2022-06-08 PROCEDURE — G8427 DOCREV CUR MEDS BY ELIG CLIN: HCPCS | Performed by: NURSE PRACTITIONER

## 2022-06-08 PROCEDURE — 1036F TOBACCO NON-USER: CPT | Performed by: NURSE PRACTITIONER

## 2022-06-08 PROCEDURE — 1123F ACP DISCUSS/DSCN MKR DOCD: CPT | Performed by: NURSE PRACTITIONER

## 2022-06-08 RX ORDER — FINASTERIDE 5 MG/1
5 TABLET, FILM COATED ORAL DAILY
Qty: 90 TABLET | Refills: 3 | Status: SHIPPED | OUTPATIENT
Start: 2022-06-08 | End: 2022-09-19 | Stop reason: SDUPTHER

## 2022-06-08 RX ORDER — SILODOSIN 8 MG/1
8 CAPSULE ORAL EVERY EVENING
Qty: 90 CAPSULE | Refills: 3 | Status: SHIPPED | OUTPATIENT
Start: 2022-06-08 | End: 2022-10-17 | Stop reason: SDUPTHER

## 2022-06-08 ASSESSMENT — ENCOUNTER SYMPTOMS
NAUSEA: 0
ABDOMINAL PAIN: 0
VOMITING: 0
BACK PAIN: 0

## 2022-06-08 NOTE — PROGRESS NOTES
[address] [facility].  [address] [place] [address]  Dept: [phone]  Loc: [phone]    Visit Date: 6/8/2022        HPI:     Bay Simms is a 70 y.o. male who presents today for:  Chief Complaint   Patient presents with    Follow-up    Benign Prostatic Hypertrophy     psa done in january        HPI   Pt seen in follow up for BPH. Pt has a hx of BPH with nocturia. Flomax failed to help. Pt is currently on rapaflo 8 mg daily and finasteride 5 mg daily was started at appt 8/17/21. PVR today 214 mls. Notes symptoms have recently worsened. Nocturia 4-5 x per night. IPSS score 26/35. Unhappy with symptoms at this time. Current Outpatient Medications   Medication Sig Dispense Refill    silodosin (RAPAFLO) 8 MG CAPS Take 1 capsule by mouth every evening 90 capsule 3    NONFORMULARY ALJ VEGITABS DAILY PRN      NONFORMULARY FENUGREEK & THYME PRN      finasteride (PROSCAR) 5 MG tablet Take 1 tablet by mouth daily 90 tablet 3    amLODIPine (NORVASC) 10 MG tablet Take 10 mg by mouth daily      medical marijuana Take by mouth as needed. Gummys       No current facility-administered medications for this visit. Past Medical History  Merry Atkinson  has a past medical history of Arthritis, Hypertension, and Vitamin D deficiency. Past Surgical History  The patient  has a past surgical history that includes Colonoscopy; Inguinal hernia repair; knee surgery; Dilatation, esophagus; other surgical history (04/04/2017); Arm Surgery (Right, 4/14/2021); and Skin cancer excision (03/2021). Family History  This patient's family history includes Cancer in his father; Heart Disease in his father. Social History  Merry Atkinson  reports that he quit smoking about 20 years ago. His smoking use included cigarettes. He started smoking about 53 years ago. He has a 25.00 pack-year smoking history.  He has never used smokeless tobacco. He reports current alcohol use of about 2.0 standard drinks of alcohol per week. He reports current drug use. Drug: Marijuana Fide [place]). Subjective:      Review of Systems   Constitutional: Negative for activity change, appetite change, chills, diaphoresis, fatigue, fever and unexpected weight change. Gastrointestinal: Negative for abdominal pain, nausea and vomiting. Genitourinary: Positive for frequency and urgency. Negative for decreased urine volume, difficulty urinating, dysuria, flank pain and hematuria. Nocturia 4-5 x per night. Musculoskeletal: Negative for back pain. Objective:   /70   Ht 5' 9.5\" (1.765 m)   Wt 188 lb (85.3 kg)   BMI 27.36 kg/m²     Physical Exam  Vitals reviewed. Constitutional:       General: He is not in acute distress. Appearance: Normal appearance. He is well-developed. He is not ill-appearing or diaphoretic. HENT:      Head: Normocephalic and atraumatic. Right Ear: External ear normal.      Left Ear: External ear normal.      Nose: Nose normal.      Mouth/Throat:      Mouth: Mucous membranes are moist.   Eyes:      General: No scleral icterus. Right eye: No discharge. Left eye: No discharge. Neck:      Vascular: No JVD. Trachea: No tracheal deviation. Pulmonary:      Effort: Pulmonary effort is normal. No respiratory distress. Abdominal:      General: There is no distension. Tenderness: There is no abdominal tenderness. There is no right CVA tenderness or left CVA tenderness. Musculoskeletal:         General: Normal range of motion. Neurological:      Mental Status: He is alert and oriented to person, place, and time. Mental status is at baseline. Psychiatric:         Mood and Affect: Mood normal.         Behavior: Behavior normal.         Thought Content:  Thought content normal.         POC  Results for POC orders placed in visit on 06/08/22   POCT Urinalysis No Micro (Auto)   Result Value Ref Range    Glucose, Ur Negative NEGATIVE mg/dl    Bilirubin Urine Negative     Ketones, Urine Negative NEGATIVE    Specific Gravity, Urine 1.015 1.002 - 1.030    Blood, UA POC Negative NEGATIVE    pH, Urine 7.00 5.0 - 9.0    Protein, Urine Negative NEGATIVE mg/dl    Urobilinogen, Urine 0.20 0.0 - 1.0 eu/dl    Nitrite, Urine Negative NEGATIVE    Leukocyte Clumps, Urine Negative NEGATIVE    Color, Urine Yellow [id number]-STRAW    Character, Urine Clear [id number].[id number]   poct post void residual   Result Value Ref Range    post void residual 214 ml         Patients recent PSA values are as follows  Lab Results   Component Value Date    PSA 0.86 01/18/2022        Recent BUN/Creatinine:  Lab Results   Component Value Date    BUN 13 05/31/2022    CREATININE 0.7 05/31/2022         Assessment:   BPH with Nocturia  Hx ED    Plan:     Pt's LUTS uncontrolled. PVR increased to 214 mls. Already on silodosin 8 mg and finasteride 5 mg daily. Discussed cystoscopy to evaluate for PAUL and pt is agreeable. We will schedule Leo for a cystoscopy with Dr. Daisy Platt.

## 2022-07-12 ENCOUNTER — PROCEDURE VISIT (OUTPATIENT)
Dept: UROLOGY | Age: 72
End: 2022-07-12
Payer: MEDICARE

## 2022-07-12 VITALS
BODY MASS INDEX: 26.34 KG/M2 | SYSTOLIC BLOOD PRESSURE: 158 MMHG | DIASTOLIC BLOOD PRESSURE: 80 MMHG | WEIGHT: 184 LBS | HEIGHT: 70 IN

## 2022-07-12 DIAGNOSIS — N40.1 BENIGN LOCALIZED PROSTATIC HYPERPLASIA WITH LOWER URINARY TRACT SYMPTOMS (LUTS): Primary | ICD-10-CM

## 2022-07-12 DIAGNOSIS — N52.9 ERECTILE DYSFUNCTION, UNSPECIFIED ERECTILE DYSFUNCTION TYPE: ICD-10-CM

## 2022-07-12 PROCEDURE — 52000 CYSTOURETHROSCOPY: CPT | Performed by: UROLOGY

## 2022-07-12 RX ORDER — CALCIUM CARBONATE 300MG(750)
TABLET,CHEWABLE ORAL
COMMUNITY

## 2022-07-12 NOTE — PROGRESS NOTES
Cystoscopy    Operative Note    Patient:  Mandeep Ellis  MRN: [id number]  YOB: 1950    Date: 07/12/22  Surgeon: Malachi Holder MD  Anesthesia: Drake Abel  Indications:     Pt seen in follow up for BPH. Pt has a hx of BPH with nocturia. Flomax failed to help. Pt is currently on rapaflo 8 mg daily and finasteride 5 mg daily was started at appt 8/17/21. PVR today 214 mls. Notes symptoms have recently worsened. Nocturia 4-5 x per night. IPSS score 26/35. Unhappy with symptoms at this time. Position: Supine  EBL: 0 ml    Findings:   The patient was prepped and draped in the usual sterile fashion. The flexible cystoscope was advanced through the urethra and into the bladder. The bladder was thoroughly inspected and the following was noted:    Residual Urine: moderate. Urine clear, with no obvious infection  Urethra: narrowed bulbar urethra. Passed with the scope. Urethral dilation was not performed. Prostate: lateral lobe hypertrophy ++ present, prostate obstructing, intravesical extension of prostate not present. There was no previous TURP defect. Bladder: no tumor noted . Moderate trabeculation noted. no bladder diverticulum. Ureters: Orifices with normal configuration and location. The cystoscope was removed. The patient tolerated the procedure well. Soft urethral stricture vs bph. Both are likely components to his voiding dysfunction  Discussed PVP. Brochure given. Attn: evelyn can schedule in future if pt need be but for now we will manage conservatively w meds.

## 2022-09-19 RX ORDER — FINASTERIDE 5 MG/1
5 TABLET, FILM COATED ORAL DAILY
Qty: 90 TABLET | Refills: 3 | Status: SHIPPED | OUTPATIENT
Start: 2022-09-19 | End: 2022-12-18

## 2022-09-22 ENCOUNTER — OFFICE VISIT (OUTPATIENT)
Dept: CARDIOLOGY CLINIC | Age: 72
End: 2022-09-22
Payer: MEDICARE

## 2022-09-22 VITALS
HEIGHT: 70 IN | HEART RATE: 78 BPM | BODY MASS INDEX: 26.77 KG/M2 | WEIGHT: 187 LBS | DIASTOLIC BLOOD PRESSURE: 82 MMHG | SYSTOLIC BLOOD PRESSURE: 132 MMHG

## 2022-09-22 DIAGNOSIS — R42 DIZZINESS ON STANDING: Primary | ICD-10-CM

## 2022-09-22 DIAGNOSIS — Z87.898 HISTORY OF SYNCOPE: ICD-10-CM

## 2022-09-22 DIAGNOSIS — R94.31 ABNORMAL EKG: ICD-10-CM

## 2022-09-22 DIAGNOSIS — I10 ESSENTIAL HYPERTENSION: ICD-10-CM

## 2022-09-22 PROCEDURE — 3017F COLORECTAL CA SCREEN DOC REV: CPT | Performed by: INTERNAL MEDICINE

## 2022-09-22 PROCEDURE — G8427 DOCREV CUR MEDS BY ELIG CLIN: HCPCS | Performed by: INTERNAL MEDICINE

## 2022-09-22 PROCEDURE — 99214 OFFICE O/P EST MOD 30 MIN: CPT | Performed by: INTERNAL MEDICINE

## 2022-09-22 PROCEDURE — G8417 CALC BMI ABV UP PARAM F/U: HCPCS | Performed by: INTERNAL MEDICINE

## 2022-09-22 PROCEDURE — 93000 ELECTROCARDIOGRAM COMPLETE: CPT | Performed by: INTERNAL MEDICINE

## 2022-09-22 PROCEDURE — 1123F ACP DISCUSS/DSCN MKR DOCD: CPT | Performed by: INTERNAL MEDICINE

## 2022-09-22 PROCEDURE — 1036F TOBACCO NON-USER: CPT | Performed by: INTERNAL MEDICINE

## 2022-09-22 NOTE — PROGRESS NOTES
Chief Complaint   Patient presents with    6 Month Follow-Up     Originally  patient here for check up ref by PCP syncope -EKG changes            6 month follow up. EKG done today. Hx of Dizziness  Improved  Markedly after hydration-     Denied cp or edema or palpitation    Sob on exertion- better    NO  plapitation    Hx of syncope- last and fisrt 2021  predrome of dizziness and syncope  No post drome  It was hot day    Drink 64 oz  Now and used to drink 32 oz    Nonsmoker    FHX  Father had CABG in his 76        Past Surgical History:   Procedure Laterality Date    ARM SURGERY Right 2021    EXCISION OF MELANOMA RIGHT UPPER ARM WITH SENTNEL LYMPH NODE BIOPSY RIGHT AXILLA performed by Joseph Raza MD at [facility]      INGUINAL HERNIA REPAIR      KNEE SURGERY      OTHER SURGICAL HISTORY  2017    robotic iliocecectomy    SKIN CANCER EXCISION  2021    melanoma removal Dr Sanchez Innocent- also removed lymph nodes        Allergies   Allergen Reactions    Penicillins      Unsure of reaction HAPPENED WHEN A WAS A TEENAGER OR EARLY TWENTY'S        Family History   Problem Relation Age of Onset    Heart Disease Father     Cancer Father         Social History     Socioeconomic History    Marital status:      Spouse name: Not on file    Number of children: Not on file    Years of education: Not on file    Highest education level: Not on file   Occupational History    Not on file   Tobacco Use    Smoking status: Former     Packs/day: 1.00     Years: 25.00     Pack years: 25.00     Types: Cigarettes     Start date: 1968     Quit date:      Years since quittin.7    Smokeless tobacco: Never   Vaping Use    Vaping Use: Former   Substance and Sexual Activity    Alcohol use:  Yes     Alcohol/week: 2.0 standard drinks     Types: 2 Standard drinks or equivalent per week     Comment: occasional    Drug use: Yes     Types: Marijuana Darryle Pimple) Comment: medical    Sexual activity: Not on file   Other Topics Concern    Not on file   Social History Narrative    Not on file     Social Determinants of Health     Financial Resource Strain: Not on file   Food Insecurity: Not on file   Transportation Needs: Not on file   Physical Activity: Not on file   Stress: Not on file   Social Connections: Not on file   Intimate Partner Violence: Not on file   Housing Stability: Not on file       Current Outpatient Medications   Medication Sig Dispense Refill    finasteride (PROSCAR) 5 MG tablet Take 1 tablet by mouth daily 90 tablet 3    Magnesium 400 MG TABS Take by mouth      Cholecalciferol (D3 ADULT PO) Take by mouth      NONFORMULARY ALJ VEGITABS DAILY PRN      NONFORMULARY FENUGREEK & THYME PRN      amLODIPine (NORVASC) 10 MG tablet Take 10 mg by mouth daily      medical marijuana Take by mouth as needed. Gummys      silodosin (RAPAFLO) 8 MG CAPS Take 1 capsule by mouth every evening 90 capsule 3     No current facility-administered medications for this visit. Review of Systems -     General ROS: negative  Psychological ROS: negative  Hematological and Lymphatic ROS: No history of blood clots or bleeding disorder. Respiratory ROS: no cough,  or wheezing, the rest see HPI  Cardiovascular ROS: See HPI  Gastrointestinal ROS: negative  Genito-Urinary ROS: no dysuria, trouble voiding, or hematuria  Musculoskeletal ROS: negative  Neurological ROS: no TIA or stroke symptoms  Dermatological ROS: negative      Blood pressure 132/82, pulse 78, height 5' 9.5\" (1.765 m), weight 187 lb (84.8 kg).         Physical Examination:    General appearance - alert, well appearing, and in no distress  HEENT- Pink conjunctiva  , Non-icteri sclera,PERRLA  Mental status - alert, oriented to person, place, and time  Neck - supple, no significant adenopathy, no JVD, or carotid bruits  Chest - clear to auscultation, no wheezes, rales or rhonchi, symmetric air entry  Heart - normal rate, regular rhythm, normal S1, S2, no murmurs, rubs, clicks or gallops  Abdomen - soft, nontender, nondistended, no masses or organomegaly  NADEEM- no CVA or flank tenderness, no suprapubic tenderness  Neurological - alert, oriented, normal speech, no focal findings or movement disorder noted  Musculoskeletal/limbs - no joint tenderness, deformity or swelling   - peripheral pulses normal, no pedal edema, no clubbing or cyanosis  Skin - normal coloration and turgor, no rashes, no suspicious skin lesions noted  Psych- appropriate mood and affect    Lab  No results for input(s): CKTOTAL, CKMB, CKMBINDEX, TROPONINI in the last 72 hours.   CBC:   Lab Results   Component Value Date/Time    WBC 6.5 05/31/2022 01:57 PM    RBC 4.76 05/31/2022 01:57 PM    RBC 4.88 11/01/2019 08:15 AM    HGB 14.5 05/31/2022 01:57 PM    HCT 44.2 05/31/2022 01:57 PM    MCV 92.9 05/31/2022 01:57 PM    MCH 30.5 05/31/2022 01:57 PM    MCHC 32.8 05/31/2022 01:57 PM    RDW 14.1 11/01/2019 08:15 AM     05/31/2022 01:57 PM    MPV 11.0 05/31/2022 01:57 PM     BMP:    Lab Results   Component Value Date/Time     05/31/2022 01:57 PM    K 3.5 05/31/2022 01:57 PM    K 4.2 07/28/2021 02:09 PM     05/31/2022 01:57 PM    CO2 25 05/31/2022 01:57 PM    BUN 13 05/31/2022 01:57 PM    LABALBU 4.8 05/31/2022 01:57 PM    CREATININE 0.7 05/31/2022 01:57 PM    CALCIUM 9.6 05/31/2022 01:57 PM    LABGLOM >90 05/31/2022 01:57 PM    GLUCOSE 98 05/31/2022 01:57 PM    GLUCOSE 93 02/18/2021 01:45 PM     Hepatic Function Panel:    Lab Results   Component Value Date/Time    ALKPHOS 96 05/31/2022 01:57 PM    ALT 13 05/31/2022 01:57 PM    AST 18 05/31/2022 01:57 PM    PROT 7.6 05/31/2022 01:57 PM    BILITOT 0.7 05/31/2022 01:57 PM    BILITOT NEGATIVE 05/20/2019 11:00 AM    BILIDIR <0.2 05/31/2022 01:57 PM    LABALBU 4.8 05/31/2022 01:57 PM     Magnesium:    Lab Results   Component Value Date/Time    MG 2.1 11/01/2019 08:15 AM     Warfarin PT/INR:  No components found for: PTPATWAR, PTINRWAR  HgBA1c:    Lab Results   Component Value Date/Time    LABA1C 5.3 11/01/2019 08:15 AM     FLP:    Lab Results   Component Value Date/Time    TRIG 67 11/01/2019 08:15 AM    HDL 81 11/01/2019 08:15 AM    LDLCALC 102 11/01/2019 08:15 AM    LABVLDL 13 11/01/2019 08:15 AM     TSH:    Lab Results   Component Value Date/Time    TSH 2.82 08/06/2019 08:15 AM     Echo   Conclusions    Summary   Normal left ventricle size and systolic function. Ejection fraction was   estimated at 60 %. There were no regional left ventricular wall motion   abnormalities and wall thickness was within normal limits. The left atrium is Mildly dilated. Signature    ----------------------------------------------------------------   Electronically signed by Merari Crowe MD (Interpreting   physician) on 08/31/2021 at 06:07 PM   ---------------------------------------------------------------      -    Conclusions      Summary   Lexiscan EKG stress test is not suggestive for ischemia. The nuclear images is not suggestive for myocardial ischemia. Signatures      ----------------------------------------------------------------   Electronically signed by Merari Crowe MD (Interpreting   Cardiologist) on 08/31/2021 at 18:50   ----------------------------------------------------------------      Sinus rhythm with 1st degree A-V block with occasional Premature ventricular complexes Minimal voltage criteria for LVH, may be normal variant Borderline ECG When compared with ECG of 28-SEP-1995 11:22, Premature ventricular complexes are now Present PA interval has increased Confirmed by Magnolia Gross MD, David Montalvo (33    ekg 7/28/21  NSR,prwp. Old anterior infarction, lad, nonsp t wave abn    Ekg 9/22/22  Sinus  Rhythm  -First degree A-V block   Ernie = 222  -RSR(V1) -nondiagnostic. BORDERLINE RHYTHM    Assessment     Diagnosis Orders   1. Dizziness on standing        2. Essential hypertension        3. History of syncope        4. Abnormal EKG          FHX of CAD      Plan     The most current  Labs and Meds reviewed    Hx of Dizziness on standing -better with hydration  Hx of syncope once  Risk factors  Abn ekg  Echo and Rich nuc - WNL  Walk with Cane  Had knee surgery    Cont hydration as dizziness better    Hypertension, on medical treatment. Seems to be under good control. Patient is compliant with medical treatment. Home sbp 120 to 140     D/w the pat the plan of care    Overall the Pat is l Stable and doing better    Discussed use, benefit, and side effects of prescribed medications. All patient questions answered. Pt voiced understanding. Instructed to continue current medications, diet and exercise. Continue risk factor modification and medical management. Patient agreed with treatment plan. Follow up as directed.       RTC in 12 months      [facility]

## 2022-10-17 RX ORDER — SILODOSIN 8 MG/1
8 CAPSULE ORAL EVERY EVENING
Qty: 90 CAPSULE | Refills: 1 | Status: SHIPPED | OUTPATIENT
Start: 2022-10-17 | End: 2023-01-15

## 2022-12-05 ENCOUNTER — HOSPITAL ENCOUNTER (OUTPATIENT)
Dept: GENERAL RADIOLOGY | Age: 72
Discharge: HOME | End: 2022-12-05
Payer: MEDICARE

## 2022-12-05 ENCOUNTER — HOSPITAL ENCOUNTER (OUTPATIENT)
Age: 72
Discharge: HOME | End: 2022-12-05
Payer: MEDICARE

## 2022-12-05 DIAGNOSIS — Z85.820 PERSONAL HISTORY OF MALIGNANT MELANOMA OF SKIN: ICD-10-CM

## 2022-12-05 DIAGNOSIS — E55.9 VITAMIN D DEFICIENCY: ICD-10-CM

## 2022-12-05 DIAGNOSIS — C43.61 MALIGNANT MELANOMA OF RIGHT UPPER EXTREMITY INCLUDING SHOULDER (HCC): ICD-10-CM

## 2022-12-05 DIAGNOSIS — Z01.818 PRE-OP TESTING: ICD-10-CM

## 2022-12-05 DIAGNOSIS — Z87.891 PERSONAL HISTORY OF TOBACCO USE, PRESENTING HAZARDS TO HEALTH: ICD-10-CM

## 2022-12-05 LAB
ALBUMIN SERPL-MCNC: 4.1 G/DL (ref 3.5–5.1)
ALP BLD-CCNC: 94 U/L (ref 38–126)
ALT SERPL-CCNC: 18 U/L (ref 11–66)
ANION GAP SERPL CALCULATED.3IONS-SCNC: 9 MEQ/L (ref 8–16)
AST SERPL-CCNC: 19 U/L (ref 5–40)
BILIRUB SERPL-MCNC: 0.4 MG/DL (ref 0.3–1.2)
BILIRUBIN DIRECT: < 0.2 MG/DL (ref 0–0.3)
BUN BLDV-MCNC: 14 MG/DL (ref 7–22)
C-REACTIVE PROTEIN: < 0.3 MG/DL (ref 0–1)
CALCIUM SERPL-MCNC: 9.8 MG/DL (ref 8.5–10.5)
CHLORIDE BLD-SCNC: 102 MEQ/L (ref 98–111)
CO2: 26 MEQ/L (ref 23–33)
CREAT SERPL-MCNC: 0.7 MG/DL (ref 0.4–1.2)
ERYTHROCYTE [DISTWIDTH] IN BLOOD BY AUTOMATED COUNT: 13.7 % (ref 11.5–14.5)
ERYTHROCYTE [DISTWIDTH] IN BLOOD BY AUTOMATED COUNT: 46.8 FL (ref 35–45)
GFR SERPL CREATININE-BSD FRML MDRD: > 60 ML/MIN/1.73M2
GLUCOSE BLD-MCNC: 100 MG/DL (ref 70–108)
HCT VFR BLD CALC: 43.5 % (ref 42–52)
HEMOGLOBIN: 14.3 GM/DL (ref 14–18)
MCH RBC QN AUTO: 30.6 PG (ref 26–33)
MCHC RBC AUTO-ENTMCNC: 32.9 GM/DL (ref 32.2–35.5)
MCV RBC AUTO: 93.1 FL (ref 80–94)
PLATELET # BLD: 341 THOU/MM3 (ref 130–400)
PMV BLD AUTO: 10.5 FL (ref 9.4–12.4)
POTASSIUM SERPL-SCNC: 4.5 MEQ/L (ref 3.5–5.2)
RBC # BLD: 4.67 MILL/MM3 (ref 4.7–6.1)
SODIUM BLD-SCNC: 137 MEQ/L (ref 135–145)
TOTAL PROTEIN: 7.5 G/DL (ref 6.1–8)
VITAMIN D 25-HYDROXY: 48 NG/ML (ref 30–100)
WBC # BLD: 5.2 THOU/MM3 (ref 4.8–10.8)

## 2022-12-05 PROCEDURE — 80053 COMPREHEN METABOLIC PANEL: CPT

## 2022-12-05 PROCEDURE — 36415 COLL VENOUS BLD VENIPUNCTURE: CPT

## 2022-12-05 PROCEDURE — 82306 VITAMIN D 25 HYDROXY: CPT

## 2022-12-05 PROCEDURE — 82248 BILIRUBIN DIRECT: CPT

## 2022-12-05 PROCEDURE — 85027 COMPLETE CBC AUTOMATED: CPT

## 2022-12-05 PROCEDURE — 86140 C-REACTIVE PROTEIN: CPT

## 2022-12-05 PROCEDURE — 71046 X-RAY EXAM CHEST 2 VIEWS: CPT

## 2023-01-10 ENCOUNTER — OFFICE VISIT (OUTPATIENT)
Dept: UROLOGY | Age: 73
End: 2023-01-10
Payer: MEDICARE

## 2023-01-10 ENCOUNTER — TELEPHONE (OUTPATIENT)
Dept: UROLOGY | Age: 73
End: 2023-01-10

## 2023-01-10 VITALS
BODY MASS INDEX: 26.48 KG/M2 | SYSTOLIC BLOOD PRESSURE: 122 MMHG | DIASTOLIC BLOOD PRESSURE: 64 MMHG | HEIGHT: 70 IN | WEIGHT: 185 LBS

## 2023-01-10 DIAGNOSIS — Z01.818 PRE-OP TESTING: ICD-10-CM

## 2023-01-10 DIAGNOSIS — N40.1 BENIGN LOCALIZED PROSTATIC HYPERPLASIA WITH LOWER URINARY TRACT SYMPTOMS (LUTS): Primary | ICD-10-CM

## 2023-01-10 LAB — POST VOID RESIDUAL (PVR): 206 ML

## 2023-01-10 PROCEDURE — 3017F COLORECTAL CA SCREEN DOC REV: CPT | Performed by: NURSE PRACTITIONER

## 2023-01-10 PROCEDURE — G8427 DOCREV CUR MEDS BY ELIG CLIN: HCPCS | Performed by: NURSE PRACTITIONER

## 2023-01-10 PROCEDURE — 1036F TOBACCO NON-USER: CPT | Performed by: NURSE PRACTITIONER

## 2023-01-10 PROCEDURE — G8484 FLU IMMUNIZE NO ADMIN: HCPCS | Performed by: NURSE PRACTITIONER

## 2023-01-10 PROCEDURE — 99214 OFFICE O/P EST MOD 30 MIN: CPT | Performed by: NURSE PRACTITIONER

## 2023-01-10 PROCEDURE — 1123F ACP DISCUSS/DSCN MKR DOCD: CPT | Performed by: NURSE PRACTITIONER

## 2023-01-10 PROCEDURE — 3074F SYST BP LT 130 MM HG: CPT | Performed by: NURSE PRACTITIONER

## 2023-01-10 PROCEDURE — G8417 CALC BMI ABV UP PARAM F/U: HCPCS | Performed by: NURSE PRACTITIONER

## 2023-01-10 PROCEDURE — 51798 US URINE CAPACITY MEASURE: CPT | Performed by: NURSE PRACTITIONER

## 2023-01-10 PROCEDURE — 3078F DIAST BP <80 MM HG: CPT | Performed by: NURSE PRACTITIONER

## 2023-01-10 RX ORDER — CLINDAMYCIN HYDROCHLORIDE 150 MG/1
CAPSULE ORAL
COMMUNITY
Start: 2022-11-23

## 2023-01-10 ASSESSMENT — ENCOUNTER SYMPTOMS
NAUSEA: 0
BACK PAIN: 0
ABDOMINAL PAIN: 0
VOMITING: 0

## 2023-01-10 NOTE — TELEPHONE ENCOUNTER
Pre op Risk Assessment    Procedure Greenlight Photo Vaporization of Prostate  Physician DR NUÑEZ [facility]  Date of surgery/procedure 2-3-23    Last OV 9-22-22  Last Stress 8-17-21  Last Echo 8-17-21  Last Cath NONE IN EPIC  Last Stent NONE IN EPIC  Is patient on blood thinners NO

## 2023-01-10 NOTE — TELEPHONE ENCOUNTER
Patient is scheduled for a Cystoscopy, Greenlight Photo Vaporization of Prostate with Dr. Carl Brooks on 2/3/23. We are requesting clearance from Dr. Bibi Lind. Thank you.

## 2023-01-10 NOTE — PROGRESS NOTES
[address] [facility] [id number]  Dept: [phone]  Loc: [phone]    Visit Date: 1/10/2023        HPI:     Kaitlin Mena is a 67 y.o. male who presents today for:  Chief Complaint   Patient presents with    Benign Prostatic Hypertrophy       HPI  Pt seen in follow up for BPH. Pt has a hx of BPH with nocturia. Flomax failed to help. Pt is currently on rapaflo 8 mg daily and finasteride 5 mg daily was started at appt 8/17/21. At [address] in June pt noted worsening in urinary symptoms with nocturia 4-5 x per night. Underwent office cystoscopy by Dr. Shreyas Lee 7/12/22 that noted narrowed bulbar urethra, lateral lobe hypertrophy ++ present, prostate obstructing, moderate bladder wall trabeculation. It was discussed at that time that soft urethral stricture and BPH were both likely contributing to his voiding dysfunction. PVP discussed. Pt reports he continues to wake now up to 6-8 x per night. Has significant LUTs. Current Outpatient Medications   Medication Sig Dispense Refill    silodosin (RAPAFLO) 8 MG CAPS Take 1 capsule by mouth every evening 90 capsule 1    Magnesium 400 MG TABS Take by mouth      Cholecalciferol (D3 ADULT PO) Take by mouth      amLODIPine (NORVASC) 10 MG tablet Take 10 mg by mouth daily      medical marijuana Take by mouth as needed. Gummys      clindamycin (CLEOCIN) 150 MG capsule TAKE 1 CAPSULE BY MOUTH 3 TIMES A DAY UNTIL GONE (Patient not taking: Reported on 1/10/2023)      finasteride (PROSCAR) 5 MG tablet Take 1 tablet by mouth daily 90 tablet 3    NONFORMULARY ALJ VEGITABS DAILY PRN (Patient not taking: Reported on 1/10/2023)      NONFORMULARY FENUGREEK & THYME PRN (Patient not taking: Reported on 1/10/2023)       No current facility-administered medications for this visit.        Past Medical History  Eduarannalise Salas  has a past medical history of Arthritis, Hypertension, and Vitamin D deficiency. Past Surgical History  The patient  has a past surgical history that includes Colonoscopy; Inguinal hernia repair; knee surgery; Dilatation, esophagus; other surgical history (04/04/2017); Arm Surgery (Right, 4/14/2021); and Skin cancer excision (03/2021). Family History  This patient's family history includes Cancer in his father; Heart Disease in his father. Social History  Zackary Enciso  reports that he quit smoking about 21 years ago. His smoking use included cigarettes. He started smoking about 54 years ago. He has a 25.00 pack-year smoking history. He has never used smokeless tobacco. He reports current alcohol use of about 2.0 standard drinks per week. He reports current drug use. Drug: Marijuana Aliza Suhas). Subjective:      Review of Systems   Constitutional:  Negative for activity change, appetite change, chills, diaphoresis, fatigue, fever and unexpected weight change. Gastrointestinal:  Negative for abdominal pain, nausea and vomiting. Genitourinary:  Positive for frequency and urgency. Negative for decreased urine volume, difficulty urinating, dysuria, flank pain and hematuria. Hesitation, weak stream, straining to urinate   Musculoskeletal:  Negative for back pain. Objective:   /64   Ht 5' 9.5\" (1.765 m)   Wt 185 lb (83.9 kg)   BMI 26.93 kg/m²     Physical Exam  Vitals reviewed. Constitutional:       General: He is not in acute distress. Appearance: Normal appearance. He is well-developed. He is not ill-appearing or diaphoretic. HENT:      Head: Normocephalic and atraumatic. Right Ear: External ear normal.      Left Ear: External ear normal.      Nose: Nose normal.      Mouth/Throat:      Mouth: Mucous membranes are moist.   Eyes:      General: No scleral icterus. Right eye: No discharge. Left eye: No discharge. Neck:      Vascular: No JVD. Trachea: No tracheal deviation.    Cardiovascular:      Rate and Rhythm: Normal rate and regular rhythm. Pulmonary:      Effort: Pulmonary effort is normal. No respiratory distress. Breath sounds: Normal breath sounds. Abdominal:      General: There is no distension. Tenderness: There is no abdominal tenderness. There is no right CVA tenderness or left CVA tenderness. Musculoskeletal:         General: Normal range of motion. Neurological:      Mental Status: He is alert and oriented to person, place, and time. Mental status is at baseline. Psychiatric:         Mood and Affect: Mood normal.         Behavior: Behavior normal.         Thought Content: Thought content normal.       POC  No results found for this visit on 01/10/23. Patients recent PSA values are as follows  Lab Results   Component Value Date    PSA 0.86 01/18/2022        Recent BUN/Creatinine:  Lab Results   Component Value Date/Time    BUN 14 12/05/2022 08:30 AM    CREATININE 0.7 12/05/2022 08:30 AM       Assessment:   BPH with Nocturia  Bulbar urethral stricture  Hx ED    Plan:     Pt is having continued uncontrolled LUTs despite silodosin and finasteride. PVR still 205 mls and pt unable to urinate in office today. Nocturia 6-8 x per day. Discussed PVP and possible urethral dilation today and Nico Kohli would like to proceed. I described the procedure in detail and also described the associated risks and benefits at length. We discussed possible alternative therapies. We discussed the risks and benefits of not undergoing therapy. Patient understands these risks and benefits and desires to proceed. Office to facilitate scheduling of cystoscopy with Greenlight photovaporization and possible urethral dilation with Dr. Ever Bucio.

## 2023-01-10 NOTE — TELEPHONE ENCOUNTER
Patient is scheduled for a Cystoscopy, Greenlight Photo Vaporization of Prostate with Dr. Rusty Jeffrey on 2/3/23. We are requesting clearance from Dr. Timmy Burns. Thank you.

## 2023-01-10 NOTE — TELEPHONE ENCOUNTER
Patient scheduled with Dr. Green Fail on 2/3/23. Surgery consent to be done upon arrival.  Dr. Bertha Pollard to clear. Patient to do urine culture on 120/23; orders mailed to patient. Surgery instructions mailed to patient. Patient will have an adult over the age of 25 with them at discharge and 24 hours after procedure.       [facility]#[id number]

## 2023-01-10 NOTE — TELEPHONE ENCOUNTER
DO NOT TAKE ASPIRIN,  FISH OIL, IBUPROFEN, MOTRIN-LIKE DRUGS AND ANY MULTIVITAMINS OR OVER THE COUNTER SUPPLEMENTS 14 DAYS PRIOR TO SURGERY. MUST HAVE AN ADULT OVER THE AGE OF 18 WITH YOU AT THE TIME OF THE PROCEDURE AND WITH YOU AT HOME AFTER THE PROCEDURE FOR 24 HOURS       Christian Westfall 1950 Diagnosis:     Surgical Physician: Dr. Tim Garcia have been scheduled for the procedure marked below:      Surgery: Cystoscopy, Greenlight Photo Vaporization of Prostate and possible urethral dilation         Date: 2/3/2023     Anesthesia: Anesthesiologist (General/Spinal)     Place of Service: [facility] Second Floor Same Day Surgery         Arrive to same day surgery by:  9:00am  (Surgery time is subject to change)      INSTRUCTIONS AS MARKED BELOW:    1.  DO NOT eat or drink anything after midnight before surgery. 2.  We prefer you shower or bathe with an antibacterial soap (Dial) the morning of surgery. 3  Please bring a current medication list, photo ID and insurance card(s) with you  4. Okay to take Tylenol  5. If you take Glucophage, Metformin or Janumet, hold 48-hours prior to surgery  6. Take blood pressure or heart medication as directed, if taken in the morning take with a small sip of water  7. The office will call you in 1-2 days after your procedure to schedule a follow up. DATE SENSITIVE TESTING-DO ON THE DATE LISTED*WALK IN *([facility] IN [facility]) NO APPOINTMENT    DO URINE CULTURE ON 1/20/23. ORDERS INCLUDED.         Date: 1/10/2023

## 2023-01-10 NOTE — TELEPHONE ENCOUNTER
SURGERY [address]  [address] [place] [address] [address] [facility] [address] OFELIA RINALDI AM OFFENEGG II.DONNA, [address]      Phone *[phone] *[phone]   Surgical Scheduling Direct Line Phone *[phone] Fax *[phone]      Arvil Guess 1950 male    [facility] [address] [place] [address] [address]   Marital Status:          Home Phone: [phone]      Cell Phone:    Telephone Information:   Mobile [phone]          Surgeon: Dr. James Chinchilla Surgery Date: 2/3/2023   Time: 11:00am    Procedure: Cystoscopy, Greenlight Photo Vaporization of Prostate    Diagnosis: BPH     Important Medical History:  In [facility]    Special Inst/Equip: JUANA Navarro NIALLZ#[id number]    CPT Codes:    00418  Latex Allergy: No     Cardiac Device:  No    Anesthesia:  General          Admission Type:  Same Day                        Admit Prior to Day of Surgery: No    Case Location:  Main OR            Preadmission Testing:  Phone Call          PAT Date and Time:______________________________________________________    PAT Confirmation #: ______________________________________________________    Post Op Visit: ___________________________________________________________    Need Preop Cardiac Clearance: Yes    Does Patient have Cardiologist/physician?      Dr. Xenia Ayon    Surgery Confirmation #: __________________________________________________    Kevin Fayey: ________________________   Date: __________________________     [employer] Name: Medicare

## 2023-01-20 ENCOUNTER — NURSE ONLY (OUTPATIENT)
Dept: LAB | Age: 73
End: 2023-01-20

## 2023-01-20 DIAGNOSIS — N40.1 BENIGN LOCALIZED PROSTATIC HYPERPLASIA WITH LOWER URINARY TRACT SYMPTOMS (LUTS): ICD-10-CM

## 2023-01-20 DIAGNOSIS — Z01.818 PRE-OP TESTING: ICD-10-CM

## 2023-01-20 PROBLEM — R19.7 DIARRHEA DUE TO MALABSORPTION: Status: RESOLVED | Noted: 2018-09-13 | Resolved: 2023-01-20

## 2023-01-20 PROBLEM — K59.01 SLOW TRANSIT CONSTIPATION: Status: RESOLVED | Noted: 2018-09-13 | Resolved: 2023-01-20

## 2023-01-20 PROBLEM — K90.9 DIARRHEA DUE TO MALABSORPTION: Status: RESOLVED | Noted: 2018-09-13 | Resolved: 2023-01-20

## 2023-01-22 LAB
BACTERIA UR CULT: ABNORMAL
ORGANISM: ABNORMAL

## 2023-01-23 ENCOUNTER — PREP FOR PROCEDURE (OUTPATIENT)
Dept: UROLOGY | Age: 73
End: 2023-01-23

## 2023-01-23 RX ORDER — SODIUM CHLORIDE 9 MG/ML
INJECTION, SOLUTION INTRAVENOUS PRN
Status: CANCELLED | OUTPATIENT
Start: 2023-01-23

## 2023-01-23 RX ORDER — SODIUM CHLORIDE 0.9 % (FLUSH) 0.9 %
5-40 SYRINGE (ML) INJECTION EVERY 12 HOURS SCHEDULED
Status: CANCELLED | OUTPATIENT
Start: 2023-01-23

## 2023-01-23 RX ORDER — SODIUM CHLORIDE 0.9 % (FLUSH) 0.9 %
5-40 SYRINGE (ML) INJECTION PRN
Status: CANCELLED | OUTPATIENT
Start: 2023-01-23

## 2023-01-24 ENCOUNTER — TELEPHONE (OUTPATIENT)
Dept: UROLOGY | Age: 73
End: 2023-01-24

## 2023-01-24 RX ORDER — CEPHALEXIN 500 MG/1
500 CAPSULE ORAL 3 TIMES DAILY
Qty: 21 CAPSULE | Refills: 0 | Status: SHIPPED | OUTPATIENT
Start: 2023-01-24 | End: 2023-01-31

## 2023-01-24 NOTE — TELEPHONE ENCOUNTER
Patient advised of the urine results and keflex was sent to the pharmacy. He voiced understanding. If he develops an allergic reaction, he will stop the medication and call the office.

## 2023-01-24 NOTE — TELEPHONE ENCOUNTER
Please review urine culture on 1/20/2023. Surgery with  [facility] on 2/3/23 for a GREENLIGHT. Thanks.

## 2023-01-24 NOTE — TELEPHONE ENCOUNTER
Dr Silver Johnson would like Keflex sent  PCN allergy listed  Have pt watch for reaction such as rash, sob, cp  Stop medication if he develops reaction

## 2023-01-27 NOTE — FLOWSHEET NOTE
Follow all instructions given by your physician  NPO after midnight   Sips of water am of surgery with allowed medications  Bring insurance info and 's license  Wear comfortable clean, loose fitting clothing  No jewelry or contact lenses to be worn day of surgery  No glue on dentures morning of surgery;you will be asked to remove them for surgery. Case for glasses. Shower night before and morning of surgery with a liquid antibacterial soap, dry with fresh clean towel; no lotions, creams or powder. Clean sheets and pillow case on bed night before surgery  Bring medications in original bottles      Please refer to the SSI-Surgical Site Infection Flyer you hopefully received in the mail-together we can prevent infections; signs and symptoms reviewed. When discharged be sure you understand how to care for your wound and that you have the phone # to call if you have any concerns or questions about your wound.  needed at discharge and someone over 18 to stay with you for 24 hours overnight (surgery may be cancelled if you don't have this)  Report to [facility] on 2nd floor  If you would become ill prior to surgery, please call the surgeon  May have a visitor with you, we request that you limit to 2 visitors in pre-op area  Masks are recommended but not required, new masks at entrance desk  Call [phone] for any questions    Covid questionnaire Complete; Patient negative for symptoms or exposure. See documentation.

## 2023-01-31 ENCOUNTER — PATIENT MESSAGE (OUTPATIENT)
Dept: UROLOGY | Age: 73
End: 2023-01-31

## 2023-01-31 NOTE — TELEPHONE ENCOUNTER
From: Chau Reid  To: Dr. Brayden Correa  Sent: 1/31/2023 4:11 PM EST  Subject: Urine culture    Finished my antibiotics today. Do I need to retest before my surgery this Friday? Thank you!

## 2023-02-01 ENCOUNTER — PREP FOR PROCEDURE (OUTPATIENT)
Dept: SURGERY | Age: 73
End: 2023-02-01

## 2023-02-01 ENCOUNTER — OFFICE VISIT (OUTPATIENT)
Dept: SURGERY | Age: 73
End: 2023-02-01
Payer: MEDICARE

## 2023-02-01 VITALS
WEIGHT: 184.4 LBS | SYSTOLIC BLOOD PRESSURE: 122 MMHG | RESPIRATION RATE: 18 BRPM | BODY MASS INDEX: 26.4 KG/M2 | OXYGEN SATURATION: 100 % | DIASTOLIC BLOOD PRESSURE: 62 MMHG | HEART RATE: 99 BPM | TEMPERATURE: 97.8 F | HEIGHT: 70 IN

## 2023-02-01 DIAGNOSIS — K40.90 RIGHT INGUINAL HERNIA: Primary | ICD-10-CM

## 2023-02-01 PROCEDURE — 3078F DIAST BP <80 MM HG: CPT | Performed by: SURGERY

## 2023-02-01 PROCEDURE — G8484 FLU IMMUNIZE NO ADMIN: HCPCS | Performed by: SURGERY

## 2023-02-01 PROCEDURE — 1123F ACP DISCUSS/DSCN MKR DOCD: CPT | Performed by: SURGERY

## 2023-02-01 PROCEDURE — 1036F TOBACCO NON-USER: CPT | Performed by: SURGERY

## 2023-02-01 PROCEDURE — 99203 OFFICE O/P NEW LOW 30 MIN: CPT | Performed by: SURGERY

## 2023-02-01 PROCEDURE — 3074F SYST BP LT 130 MM HG: CPT | Performed by: SURGERY

## 2023-02-01 PROCEDURE — G8427 DOCREV CUR MEDS BY ELIG CLIN: HCPCS | Performed by: SURGERY

## 2023-02-01 PROCEDURE — 3017F COLORECTAL CA SCREEN DOC REV: CPT | Performed by: SURGERY

## 2023-02-01 PROCEDURE — G8417 CALC BMI ABV UP PARAM F/U: HCPCS | Performed by: SURGERY

## 2023-02-01 RX ORDER — CIPROFLOXACIN 2 MG/ML
400 INJECTION, SOLUTION INTRAVENOUS
Status: CANCELLED | OUTPATIENT
Start: 2023-02-01

## 2023-02-01 RX ORDER — SODIUM CHLORIDE 9 MG/ML
INJECTION, SOLUTION INTRAVENOUS CONTINUOUS
Status: CANCELLED | OUTPATIENT
Start: 2023-02-01

## 2023-02-01 NOTE — TELEPHONE ENCOUNTER
Urine culture positive for streptococcus gallolytical ssp pasteurianus. Recent antibiotic therapy was appropriate.

## 2023-02-03 ENCOUNTER — ANESTHESIA EVENT (OUTPATIENT)
Dept: OPERATING ROOM | Age: 73
End: 2023-02-03
Payer: MEDICARE

## 2023-02-03 ENCOUNTER — ANESTHESIA (OUTPATIENT)
Dept: OPERATING ROOM | Age: 73
End: 2023-02-03
Payer: MEDICARE

## 2023-02-03 ENCOUNTER — HOSPITAL ENCOUNTER (OUTPATIENT)
Age: 73
Setting detail: OUTPATIENT SURGERY
Discharge: HOME | End: 2023-02-03
Attending: UROLOGY | Admitting: UROLOGY
Payer: MEDICARE

## 2023-02-03 VITALS
RESPIRATION RATE: 18 BRPM | TEMPERATURE: 97 F | SYSTOLIC BLOOD PRESSURE: 127 MMHG | HEART RATE: 83 BPM | OXYGEN SATURATION: 100 % | WEIGHT: 176 LBS | HEIGHT: 69 IN | DIASTOLIC BLOOD PRESSURE: 71 MMHG | BODY MASS INDEX: 26.07 KG/M2

## 2023-02-03 DIAGNOSIS — G89.18 POSTOPERATIVE PAIN: Primary | ICD-10-CM

## 2023-02-03 PROCEDURE — 6360000002 HC RX W HCPCS: Performed by: NURSE ANESTHETIST, CERTIFIED REGISTERED

## 2023-02-03 PROCEDURE — 2580000003 HC RX 258: Performed by: UROLOGY

## 2023-02-03 PROCEDURE — 6360000002 HC RX W HCPCS: Performed by: UROLOGY

## 2023-02-03 RX ORDER — MEPERIDINE HYDROCHLORIDE 25 MG/ML
12.5 INJECTION INTRAMUSCULAR; INTRAVENOUS; SUBCUTANEOUS EVERY 5 MIN PRN
Status: DISCONTINUED | OUTPATIENT
Start: 2023-02-03 | End: 2023-02-03 | Stop reason: HOSPADM

## 2023-02-03 RX ORDER — IPRATROPIUM BROMIDE AND ALBUTEROL SULFATE 2.5; .5 MG/3ML; MG/3ML
1 SOLUTION RESPIRATORY (INHALATION)
Status: DISCONTINUED | OUTPATIENT
Start: 2023-02-03 | End: 2023-02-03 | Stop reason: CLARIF

## 2023-02-03 RX ORDER — SODIUM CHLORIDE 0.9 % (FLUSH) 0.9 %
5-40 SYRINGE (ML) INJECTION EVERY 12 HOURS SCHEDULED
Status: DISCONTINUED | OUTPATIENT
Start: 2023-02-03 | End: 2023-02-03 | Stop reason: HOSPADM

## 2023-02-03 RX ORDER — FENTANYL CITRATE 50 UG/ML
50 INJECTION, SOLUTION INTRAMUSCULAR; INTRAVENOUS EVERY 5 MIN PRN
Status: DISCONTINUED | OUTPATIENT
Start: 2023-02-03 | End: 2023-02-03 | Stop reason: HOSPADM

## 2023-02-03 RX ORDER — LABETALOL HYDROCHLORIDE 5 MG/ML
10 INJECTION, SOLUTION INTRAVENOUS
Status: DISCONTINUED | OUTPATIENT
Start: 2023-02-03 | End: 2023-02-03 | Stop reason: HOSPADM

## 2023-02-03 RX ORDER — MORPHINE SULFATE 2 MG/ML
2 INJECTION, SOLUTION INTRAMUSCULAR; INTRAVENOUS EVERY 5 MIN PRN
Status: DISCONTINUED | OUTPATIENT
Start: 2023-02-03 | End: 2023-02-03 | Stop reason: HOSPADM

## 2023-02-03 RX ORDER — ONDANSETRON 2 MG/ML
4 INJECTION INTRAMUSCULAR; INTRAVENOUS
Status: DISCONTINUED | OUTPATIENT
Start: 2023-02-03 | End: 2023-02-03 | Stop reason: HOSPADM

## 2023-02-03 RX ORDER — CIPROFLOXACIN 500 MG/1
500 TABLET, FILM COATED ORAL 2 TIMES DAILY
Qty: 10 TABLET | Refills: 0 | Status: SHIPPED | OUTPATIENT
Start: 2023-02-03 | End: 2023-02-08

## 2023-02-03 RX ORDER — HYDROCODONE BITARTRATE AND ACETAMINOPHEN 5; 325 MG/1; MG/1
1 TABLET ORAL EVERY 6 HOURS PRN
Qty: 12 TABLET | Refills: 0 | Status: SHIPPED | OUTPATIENT
Start: 2023-02-03 | End: 2023-02-08

## 2023-02-03 RX ORDER — SODIUM CHLORIDE 9 MG/ML
25 INJECTION, SOLUTION INTRAVENOUS PRN
Status: DISCONTINUED | OUTPATIENT
Start: 2023-02-03 | End: 2023-02-03 | Stop reason: HOSPADM

## 2023-02-03 RX ORDER — SODIUM CHLORIDE 0.9 % (FLUSH) 0.9 %
5-40 SYRINGE (ML) INJECTION PRN
Status: DISCONTINUED | OUTPATIENT
Start: 2023-02-03 | End: 2023-02-03 | Stop reason: HOSPADM

## 2023-02-03 RX ORDER — PROPOFOL 10 MG/ML
INJECTION, EMULSION INTRAVENOUS PRN
Status: DISCONTINUED | OUTPATIENT
Start: 2023-02-03 | End: 2023-02-03 | Stop reason: SDUPTHER

## 2023-02-03 RX ORDER — DROPERIDOL 2.5 MG/ML
0.62 INJECTION, SOLUTION INTRAMUSCULAR; INTRAVENOUS
Status: DISCONTINUED | OUTPATIENT
Start: 2023-02-03 | End: 2023-02-03 | Stop reason: HOSPADM

## 2023-02-03 RX ORDER — DIPHENHYDRAMINE HYDROCHLORIDE 50 MG/ML
12.5 INJECTION INTRAMUSCULAR; INTRAVENOUS
Status: DISCONTINUED | OUTPATIENT
Start: 2023-02-03 | End: 2023-02-03 | Stop reason: HOSPADM

## 2023-02-03 RX ORDER — ONDANSETRON 2 MG/ML
INJECTION INTRAMUSCULAR; INTRAVENOUS PRN
Status: DISCONTINUED | OUTPATIENT
Start: 2023-02-03 | End: 2023-02-03 | Stop reason: SDUPTHER

## 2023-02-03 RX ORDER — LORAZEPAM 2 MG/ML
0.5 INJECTION INTRAMUSCULAR
Status: DISCONTINUED | OUTPATIENT
Start: 2023-02-03 | End: 2023-02-03 | Stop reason: HOSPADM

## 2023-02-03 RX ORDER — PHENYLEPHRINE HYDROCHLORIDE 10 MG/ML
INJECTION INTRAVENOUS PRN
Status: DISCONTINUED | OUTPATIENT
Start: 2023-02-03 | End: 2023-02-03 | Stop reason: SDUPTHER

## 2023-02-03 RX ORDER — DEXAMETHASONE SODIUM PHOSPHATE 10 MG/ML
INJECTION, EMULSION INTRAMUSCULAR; INTRAVENOUS PRN
Status: DISCONTINUED | OUTPATIENT
Start: 2023-02-03 | End: 2023-02-03 | Stop reason: SDUPTHER

## 2023-02-03 RX ORDER — HYDRALAZINE HYDROCHLORIDE 20 MG/ML
10 INJECTION INTRAMUSCULAR; INTRAVENOUS
Status: DISCONTINUED | OUTPATIENT
Start: 2023-02-03 | End: 2023-02-03 | Stop reason: HOSPADM

## 2023-02-03 RX ORDER — ALBUTEROL SULFATE 2.5 MG/3ML
2.5 SOLUTION RESPIRATORY (INHALATION)
Status: DISCONTINUED | OUTPATIENT
Start: 2023-02-03 | End: 2023-02-03 | Stop reason: HOSPADM

## 2023-02-03 RX ORDER — PHENAZOPYRIDINE HYDROCHLORIDE 200 MG/1
200 TABLET, FILM COATED ORAL 3 TIMES DAILY PRN
Qty: 15 TABLET | Refills: 1 | Status: SHIPPED | OUTPATIENT
Start: 2023-02-03 | End: 2023-02-08

## 2023-02-03 RX ORDER — SODIUM CHLORIDE 9 MG/ML
INJECTION, SOLUTION INTRAVENOUS PRN
Status: DISCONTINUED | OUTPATIENT
Start: 2023-02-03 | End: 2023-02-03 | Stop reason: HOSPADM

## 2023-02-03 RX ORDER — FENTANYL CITRATE 50 UG/ML
INJECTION, SOLUTION INTRAMUSCULAR; INTRAVENOUS PRN
Status: DISCONTINUED | OUTPATIENT
Start: 2023-02-03 | End: 2023-02-03 | Stop reason: SDUPTHER

## 2023-02-03 RX ADMIN — PROPOFOL 200 MG: 10 INJECTION, EMULSION INTRAVENOUS at 10:28

## 2023-02-03 RX ADMIN — FENTANYL CITRATE 50 MCG: 50 INJECTION, SOLUTION INTRAMUSCULAR; INTRAVENOUS at 10:28

## 2023-02-03 RX ADMIN — ONDANSETRON 4 MG: 2 INJECTION INTRAMUSCULAR; INTRAVENOUS at 10:43

## 2023-02-03 RX ADMIN — PHENYLEPHRINE HYDROCHLORIDE 100 MCG: 10 INJECTION INTRAVENOUS at 10:35

## 2023-02-03 RX ADMIN — PHENYLEPHRINE HYDROCHLORIDE 100 MCG: 10 INJECTION INTRAVENOUS at 10:37

## 2023-02-03 RX ADMIN — Medication 100 MG: at 10:28

## 2023-02-03 RX ADMIN — PHENYLEPHRINE HYDROCHLORIDE 100 MCG: 10 INJECTION INTRAVENOUS at 10:49

## 2023-02-03 RX ADMIN — DEXAMETHASONE SODIUM PHOSPHATE 8 MG: 10 INJECTION, EMULSION INTRAMUSCULAR; INTRAVENOUS at 10:23

## 2023-02-03 RX ADMIN — Medication 2000 MG: at 10:28

## 2023-02-03 RX ADMIN — PHENYLEPHRINE HYDROCHLORIDE 100 MCG: 10 INJECTION INTRAVENOUS at 10:45

## 2023-02-03 RX ADMIN — SODIUM CHLORIDE: 9 INJECTION, SOLUTION INTRAVENOUS at 09:45

## 2023-02-03 RX ADMIN — PHENYLEPHRINE HYDROCHLORIDE 100 MCG: 10 INJECTION INTRAVENOUS at 10:47

## 2023-02-03 RX ADMIN — FENTANYL CITRATE 50 MCG: 50 INJECTION, SOLUTION INTRAMUSCULAR; INTRAVENOUS at 10:23

## 2023-02-03 ASSESSMENT — PAIN SCALES - GENERAL
PAINLEVEL_OUTOF10: 1
PAINLEVEL_OUTOF10: 1
PAINLEVEL_OUTOF10: 0
PAINLEVEL_OUTOF10: 1

## 2023-02-03 ASSESSMENT — ENCOUNTER SYMPTOMS: SHORTNESS OF BREATH: 1

## 2023-02-03 ASSESSMENT — PAIN - FUNCTIONAL ASSESSMENT: PAIN_FUNCTIONAL_ASSESSMENT: NONE - DENIES PAIN

## 2023-02-03 NOTE — DISCHARGE INSTRUCTIONS
-OK to dc home.  -Home with koroma; you may see some blood in urine in the tubing and this is normal as long as the catheter is draining well  -If catheter does not draining, call the office or report to the ER  -Stay well hydrated  -No heavy lifting >20lbs for 6 weeks  -You may see blood with urination on and off for 4 weeks, this is normal and will improve. The most important thing is if the catheter is draining and you are able to empty your bladder.  -You may have burning with urination on and off for 2-3 weeks, this is normal and should improve  - Report to the ER if chest pain, shortness of breath, fever >101.5  - You may take tylenol or motrin OTC as needed for pain  - Take antibiotics as prescribed the day before catheter removal  - Make sure you take stool softeners so that you are not straining during bowel movements    Pt will Follow up with Abdi Iraheta MD for a void trial. Call office for follow up. You may resume your blood thinners in 72 hours.

## 2023-02-03 NOTE — ANESTHESIA POSTPROCEDURE EVALUATION
Department of Anesthesiology  Postprocedure Note    Patient: Em Almonte  MRN: [id number]  YOB: 1950  Date of evaluation: 2/3/2023      Procedure Summary     Date: 02/03/23 Room / Location: [address] MEAGAN Lau    Anesthesia Start: 7399 Anesthesia Stop: 1108    Procedure: CYSTO, GREENLIGHT PHOTO VAPORIZATION OF PROSTATE Diagnosis:       Benign prostatic hyperplasia, unspecified whether lower urinary tract symptoms present      (Benign prostatic hyperplasia, unspecified whether lower urinary tract symptoms present [N40.0])    Surgeons: Sarah Spencer MD Responsible Provider: Lidia Weaver MD    Anesthesia Type: general ASA Status: 3          Anesthesia Type: No value filed.     Liat Phase I: Liat Score: 10    Liat Phase II:        Anesthesia Post Evaluation    Complications: no

## 2023-02-03 NOTE — BRIEF OP NOTE
Brief Postoperative Note      Patient: Mellissa Runner  YOB: 1950  MRN: [id number]    Date of Procedure: 2/3/2023    Pre-Op Diagnosis: Benign prostatic hyperplasia, unspecified whether lower urinary tract symptoms present [N40.0]    Post-Op Diagnosis: Same       Procedure(s):  CYSTO, GREENLIGHT PHOTO VAPORIZATION OF PROSTATE    Surgeon(s):  Jamir Benítez MD    Assistant:  * No surgical staff found *    Anesthesia: General    Estimated Blood Loss (mL): Minimal    Complications: None    Specimens:   * No specimens in log *    Implants:  * No implants in log *      Drains: * No LDAs found *    Findings: koroma out Monday. Jeaneth two weeks.  Rylan Gonzalez 4 months    Electronically signed by Augustnia Belcher MD on 2/3/2023 at 11:17 AM

## 2023-02-03 NOTE — PROGRESS NOTES
Patient admitted to [facility] room 14 with wife at bedside. Bed in low position side rails up call light in reach. Patient denies questions at this time.

## 2023-02-03 NOTE — PROGRESS NOTES
Pt returned to [facility] room 14. Vitals and assessment as charted. 0.9 infusing, @950ml to count from PACU. Pt has muffin and water. Family at the bedside. Pt and family verbalized understanding of discharge criteria and call light use. Call light in reach.

## 2023-02-03 NOTE — OP NOTE
[address] [place] [address]. [place]    DATE: 2/3/2023  Patient:  Guerrero Ochoa  MRN: [id number]  YOB: 1950    SURGEON: Yasir Gomez MD.    ASSISTANT: none    PREOPERATIVE DIAGNOSIS: BPH with outlet obstruction    POSTOPERATIVE DIAGNOSIS: BPH with outlet obstruction    PROCEDURE PERFORMED:  Cystoscopy, Greenlight Photovaporization of Prostate,        ANESTHESIA: General    COMPLICATIONS: none    OR BLOOD LOSS:  Minimal    FLUIDS: Cystalloids per Anesthesia    SPECIMENS:  * No specimens in log *  none    DRAINS: [address] koroma    INDICATIONS FOR PROCEDURE:  The patient is a 67 y.o. male who presents today with Benign prostatic hyperplasia, unspecified whether lower urinary tract symptoms present [N40.0] here for CYSTO, GREENLIGHT PHOTO VAPORIZATION OF PROSTATE. After risks, benefits and alternatives of the procedure were discussed with the patient, the patient elected to proceed. DETAILS OF PROCEDURE:  After informed consent was obtained in the preoperative area, the patient was taken back to the operating room and transferred to the operating table in supine position. EPC cuffs were placed. The machine was turned on. Anesthesia was induced and antibiotics were given. The patient was placed in modified dorsal lithotomy position and sterilely prepped and draped in a standard fashion. A timeout occurred. Two patient identifiers were used. The 25F rigid scope with the visual obturator was carefully advanced through the urethra. .  Once within the bladder the visual obturator was exchanged for the resection bridge. The ureteral orifices were located and noted to be remote from the bladder neck. The prostate was surveyed. the lateral lobes were noted to be significantly obstructing. . There was no median lobe present. Enucleation of the median lobe was not performed. The vaporization was started proximal with a power setting of 80W.  Both the left and right lateral lobes were vaporized in their entirety down to the level of the surgical capsule up to a power of 180 barcenas. With this complete, the apical dissection was carried out delicately. All of the obstructing adenoma was vaporized. Exquisite care was taken to ensure the integrity of the urinary sphincter. Once completed, the sphincter was evaluated and found to be clear of the resection bed, and completely intact. The anterior adenoma was the last tissue to be addressed. The scope was rotated for ease of resection. One last evaluation of the prostatic urethra demonstrated complete vaporization of the gland to the surgical capsule circumferentially. The scope was advanced into the bladder. The ureteral orifices were re-surveyed and noted to be in pristine condition, free of laser scatter. The cystoscope was then removed, and a 22F 3-way [place] catheter was placed into the bladder. When urine returned, the balloon was instilled with sterile water. The catheter was attached to gentle bladder irrigation using 0.9% normal saline. The catheter was fixed to the leg using a Austen strap. The patient was then awakened and discharged back to the PACU in good and stable condition. Follow-Up: The bladder will be irrigated in the PACU.  As long as the urine remains clear, the irrigation port will be plugged and the patient will be discharged home with plans to follow for catheter removal.

## 2023-02-03 NOTE — PROGRESS NOTES
1105- Pt to PACU. Alert and oriented. VSS on RA. Respirations equal and unlabored. Denies pain and nausea. CBI bags marked, 950ml emptied from koroma bag, and flow slowed. 1115- Resting with eyes closed. Denies pain and nausea. VSS on RA.     1125- Denies pain and nausea. VSS on RA.     1135- Pt met PACU discharge criteria, transferred to [facility].

## 2023-02-03 NOTE — ANESTHESIA PRE PROCEDURE
Department of Anesthesiology  Preprocedure Note       Name:  Chet Wolff   Age:  67 y.o.  :  1950                                          MRN:  [id number]         Date:  2/3/2023      Surgeon: Juan José Gross):  Dinesh Lee MD    Procedure: Procedure(s):  CYSTO, GREENLIGHT PHOTO VAPORIZATION OF PROSTATE    Medications prior to admission:   Prior to Admission medications    Medication Sig Start Date End Date Taking? Authorizing Provider   silodosin (RAPAFLO) 8 MG CAPS Take 1 capsule by mouth every evening 10/17/22 1/27/23  Sabrina Toth PA-C   finasteride (PROSCAR) 5 MG tablet Take 1 tablet by mouth daily 22  CICI Luz - CNP   Magnesium 400 MG TABS Take 1 tablet by mouth daily    Historical Provider, MD   Cholecalciferol (D3 ADULT PO) Take 400 mg by mouth in the morning and at bedtime    Historical Provider, MD   amLODIPine (NORVASC) 10 MG tablet Take 10 mg by mouth daily    Historical Provider, MD   medical marijuana Take by mouth as needed. Bc    Historical Provider, MD       Current medications:    Current Facility-Administered Medications   Medication Dose Route Frequency Provider Last Rate Last Admin    sodium chloride flush 0.9 % injection 5-40 mL  5-40 mL IntraVENous 2 times per day Dinesh Lee MD        sodium chloride flush 0.9 % injection 5-40 mL  5-40 mL IntraVENous PRN Dinesh Lee MD        0.9 % sodium chloride infusion   IntraVENous PRN Dinesh Lee MD        ceFAZolin (ANCEF) 2000 mg in 0.9% sodium chloride 50 mL IVPB  2,000 mg IntraVENous On Call to [id number] [place], MD           Allergies:     Allergies   Allergen Reactions    Penicillins      Unsure of reaction HAPPENED WHEN A WAS A TEENAGER OR EARLY TWENTY'S       Problem List:    Patient Active Problem List   Diagnosis Code    Colonic mass K63.89    Excessive sweating R61    Weight gain R63.5    Sneeze R06.7    Vision abnormalities H53.9    Palpitation R00.2    Heat intolerance R68.89    Aches R52    Tinea pedis of both feet B35.3    Lightheaded R42    Numbness and tingling in both hands R20.0, R20.2    Bruising T14. 8XXA    Sleep difficulties G47.9    Memory difficulties R41.3    Anxiety F41.9    Elevated lipids E78.5    Blood glucose elevated R73.9    Abnormal EKG R94.31    Essential hypertension I10    SOB (shortness of breath) on exertion R06.02    Dizziness on standing R42    History of syncope Z87.898       Past Medical History:        Diagnosis Date    Arthritis     Cancer [facility]) 2017    colon dr Ronna Rosales ; [place]    Chronic sinusitis     Hypertension     Ag    Vitamin D deficiency        Past Surgical History:        Procedure Laterality Date    ARM SURGERY Right 2021    EXCISION OF MELANOMA RIGHT UPPER ARM WITH SENTNEL LYMPH NODE BIOPSY RIGHT AXILLA performed by Ayesha Chung MD at [facility]      [place]    DILATATION, ESOPHAGUS      INGUINAL HERNIA REPAIR Left     30 years ago    KNEE SURGERY Left     OTHER SURGICAL HISTORY  2017    robotic ileocecectomy-Dr Espino    SKIN CANCER EXCISION  2021    melanoma removal Dr Hossein Paz- also removed lymph nodes        Social History:    Social History     Tobacco Use    Smoking status: Former     Packs/day: 1.00     Years: 25.00     Pack years: 25.00     Types: Cigarettes     Start date: 1968     Quit date:      Years since quittin.1    Smokeless tobacco: Never   Substance Use Topics    Alcohol use:  Yes     Alcohol/week: 2.0 standard drinks     Types: 2 Standard drinks or equivalent per week     Comment: occasional                                Counseling given: Not Answered      Vital Signs (Current):   Vitals:    23 1415 23 0851 23 0855   BP:  (!) 152/73    Pulse:  93    Resp:  16    Temp:  97.7 °F (36.5 °C)    TempSrc:  Temporal    SpO2:  98%    Weight: 170 lb 9.6 oz (77.4 kg)  176 lb (79.8 kg)   Height: 5' 9.5\" (1.765 m)  5' 9\" (1.753 m) BP Readings from Last 3 Encounters:   02/03/23 (!) 152/73   02/01/23 122/62   01/10/23 122/64       NPO Status: Time of last liquid consumption: 0700                        Time of last solid consumption: 1900                        Date of last liquid consumption: 02/03/23                        Date of last solid food consumption: 02/02/23    BMI:   Wt Readings from Last 3 Encounters:   02/03/23 176 lb (79.8 kg)   02/01/23 184 lb 6.4 oz (83.6 kg)   01/10/23 185 lb (83.9 kg)     Body mass index is 25.99 kg/m². CBC:   Lab Results   Component Value Date/Time    WBC 5.2 12/05/2022 08:30 AM    RBC 4.67 12/05/2022 08:30 AM    RBC 4.88 11/01/2019 08:15 AM    HGB 14.3 12/05/2022 08:30 AM    HCT 43.5 12/05/2022 08:30 AM    MCV 93.1 12/05/2022 08:30 AM    RDW 14.1 11/01/2019 08:15 AM     12/05/2022 08:30 AM       CMP:   Lab Results   Component Value Date/Time     12/05/2022 08:30 AM    K 4.5 12/05/2022 08:30 AM    K 4.2 07/28/2021 02:09 PM     12/05/2022 08:30 AM    CO2 26 12/05/2022 08:30 AM    BUN 14 12/05/2022 08:30 AM    CREATININE 0.7 12/05/2022 08:30 AM    LABGLOM >60 12/05/2022 08:24 AM    GLUCOSE 100 12/05/2022 08:30 AM    GLUCOSE 93 02/18/2021 01:45 PM    PROT 7.5 12/05/2022 08:30 AM    CALCIUM 9.8 12/05/2022 08:30 AM    BILITOT 0.4 12/05/2022 08:30 AM    BILITOT NEGATIVE 05/20/2019 11:00 AM    ALKPHOS 94 12/05/2022 08:30 AM    AST 19 12/05/2022 08:30 AM    ALT 18 12/05/2022 08:30 AM       POC Tests: No results for input(s): POCGLU, POCNA, POCK, POCCL, POCBUN, POCHEMO, POCHCT in the last 72 hours.     Coags: No results found for: PROTIME, INR, APTT    HCG (If Applicable): No results found for: PREGTESTUR, PREGSERUM, HCG, HCGQUANT     ABGs: No results found for: PHART, PO2ART, MVB3AVZ, GJT2JXK, BEART, Z2VIFMNK     Type & Screen (If Applicable):  Lab Results   Component Value Date    LAB POS 04/04/2017       Drug/Infectious Status (If Applicable):  No results found for: HIV, HEPCAB    COVID-19 Screening (If Applicable):   Lab Results   Component Value Date/Time    COVID19 NOT DETECTED 07/28/2021 05:30 PM    COVID19 Not Detected 04/07/2021 01:15 PM           Anesthesia Evaluation    Airway: Mallampati: II          Dental:          Pulmonary:   (+) shortness of breath:                             Cardiovascular:    (+) hypertension:, BEGUM:,                   Neuro/Psych:               GI/Hepatic/Renal:             Endo/Other:                     Abdominal:             Vascular: Other Findings:           Anesthesia Plan      general     ASA 3       Induction: intravenous. Anesthetic plan and risks discussed with patient.                         Kassie Best MD   2/3/2023

## 2023-02-03 NOTE — PROGRESS NOTES
Pt has met discharge criteria and states he is ready for discharge to home. IV removed, gauze and tape applied. Dressed in own clothes and personal belongings gathered. Discharge instructions (with opioid medication education information) given to pt and family; pt and family verbalized understanding of discharge instructions, prescriptions and follow up appointments. Pt transported to discharge lobby by [facility] staff. Pt and wife given info on koroma, all questions answered.

## 2023-02-03 NOTE — H&P
Hao Segura MD  History and Physical    Patient:  Michael Harper  MRN: [id number]  YOB: 1950    HISTORY OF PRESENT ILLNESS:     The patient is a 67 y.o. male who presents with bph. Here for procedure. Patient's old records, notes and chart reviewed and summarized above. Hao Segura MD independently reviewed the images and verified the radiology reports from:    XR CHEST (2 VW)    Result Date: 12/5/2022  PROCEDURE: XR CHEST (2 VW) CLINICAL INFORMATION: Personal history of tobacco use. Malignant melanoma of right upper extremity/shoulder. COMPARISON: Chest x-ray 5/31/2022. TECHNIQUE: PA and lateral views of the chest performed. FINDINGS: Lines/tubes: None. Heart/mediastinum: The heart size is normal. The pulmonary vascularity is unremarkable. Lungs: The lungs are hyperinflated with flattening of the hemidiaphragms bilaterally. No focal consolidation, pleural effusion, or pneumothorax is observed. Bones: Surgical clips in the right axilla are unchanged. The visualized skeletal structures appear intact. Diffuse osteopenia is observed. Degenerative disc disease in the thoracic spine is stable. No acute intrathoracic process. **This report has been created using voice recognition software. It may contain minor errors which are inherent in voice recognition technology. ** Final report electronically signed by Dr Linda Wan on 12/5/2022 8:55 AM        Past Medical History:    Past Medical History:   Diagnosis Date    Arthritis     Cancer [facility]) 2017    colon dr Ramo Ferro ; Mariano Rivero    Chronic sinusitis     Hypertension     Ag    Vitamin D deficiency        Past Surgical History:    Past Surgical History:   Procedure Laterality Date    ARM SURGERY Right 04/14/2021    EXCISION OF MELANOMA RIGHT UPPER ARM WITH SENTNEL LYMPH NODE BIOPSY RIGHT AXILLA performed by Richie Canela MD at [address] [place] [address]  2021    [address], ESOPHAGUS      INGUINAL HERNIA REPAIR Left     30 years ago    KNEE SURGERY Left     OTHER SURGICAL HISTORY  2017    robotic ileocecectomy-Dr Espino    SKIN CANCER EXCISION  2021    melanoma removal Dr Mani Monteiro- also removed lymph nodes        Medications Prior to Admission:    Prior to Admission medications    Medication Sig Start Date End Date Taking? Authorizing Provider   silodosin (RAPAFLO) 8 MG CAPS Take 1 capsule by mouth every evening 10/17/22 1/27/23  Nubia Toth PA-C   finasteride (PROSCAR) 5 MG tablet Take 1 tablet by mouth daily 22  CICI Allen - CNP   Magnesium 400 MG TABS Take 1 tablet by mouth daily    Historical Provider, MD   Cholecalciferol (D3 ADULT PO) Take 400 mg by mouth in the morning and at bedtime    Historical Provider, MD   amLODIPine (NORVASC) 10 MG tablet Take 10 mg by mouth daily    Historical Provider, MD   medical marijuana Take by mouth as needed. Bc    Historical Provider, MD       Allergies:  Penicillins    Social History:    Social History     Socioeconomic History    Marital status:      Spouse name: Not on file    Number of children: Not on file    Years of education: Not on file    Highest education level: Not on file   Occupational History    Not on file   Tobacco Use    Smoking status: Former     Packs/day: 1.00     Years: 25.00     Pack years: 25.00     Types: Cigarettes     Start date: 1968     Quit date:      Years since quittin.1    Smokeless tobacco: Never   Vaping Use    Vaping Use: Some days    Substances: CBD   Substance and Sexual Activity    Alcohol use:  Yes     Alcohol/week: 2.0 standard drinks     Types: 2 Standard drinks or equivalent per week     Comment: occasional    Drug use: Yes     Types: Marijuana Gilbert Calamity)     Comment: medical    Sexual activity: Not on file   Other Topics Concern    Not on file   Social History Narrative    Not on file     Social Determinants of Health     Financial Resource Strain: Not on file   Food Insecurity: Not on file   Transportation Needs: Not on file   Physical Activity: Not on file   Stress: Not on file   Social Connections: Not on file   Intimate Partner Violence: Not on file   Housing Stability: Not on file       Family History:    Family History   Problem Relation Age of Onset    Heart Disease Father     Cancer Father        REVIEW OF SYSTEMS:  Constitutional: negative  Eyes: negative  Respiratory: negative  Cardiovascular: negative  Gastrointestinal: negative  Genitourinary: no acute issues  Musculoskeletal: negative  Skin: negative   Neurological: negative  Hematological/Lymphatic: negative  Psychological: negative    Physical Exam:      No data found. Constitutional: Patient in no acute distress; Neuro: alert and oriented to person place and time. Psych: Mood and affect normal.  Skin: Normal  Lungs: Respiratory effort normal, CTA  Cardiovascular:  Normal peripheral pulses; no murmur. Normal rhythm  Abdomen: Soft, non-tender, non-distended with no CVA, flank pain, hepatosplenomegaly or hernia. Kidneys normal.  Bladder non-tender and not distended. LABS:   No results for input(s): WBC, HGB, HCT, MCV, PLT in the last 72 hours. No results for input(s): NA, K, CL, CO2, PHOS, BUN, CREATININE, CA in the last 72 hours. Lab Results   Component Value Date    PSA 0.86 01/18/2022         Urinalysis: No results for input(s): COLORU, PHUR, LABCAST, WBCUA, RBCUA, MUCUS, TRICHOMONAS, YEAST, BACTERIA, CLARITYU, SPECGRAV, LEUKOCYTESUR, UROBILINOGEN, Gwynneth Pedro Luis in the last 72 hours.     Invalid input(s): NITRATE, GLUCOSEUKETONESUAMORPHOUS     -----------------------------------------------------------------      Assessment and Plan     Impression:    Patient Active Problem List   Diagnosis    Colonic mass    Excessive sweating    Weight gain    Sneeze    Vision abnormalities    Palpitation    Heat intolerance    Aches    Tinea pedis of both feet    Lightheaded    Numbness and tingling in both hands Bruising    Sleep difficulties    Memory difficulties    Anxiety    Elevated lipids    Blood glucose elevated    Abnormal EKG    Essential hypertension    SOB (shortness of breath) on exertion    Dizziness on standing    History of syncope       Plan:     Consent obtained; greenlight  in OR today    Charo Godoy MD  6:43 AM 2/3/2023

## 2023-02-04 ASSESSMENT — ENCOUNTER SYMPTOMS
SINUS PRESSURE: 0
NAUSEA: 0
ABDOMINAL DISTENTION: 0
TROUBLE SWALLOWING: 0
ABDOMINAL PAIN: 1
EYE PAIN: 0
VOICE CHANGE: 0
CHOKING: 0
BACK PAIN: 0
COLOR CHANGE: 0
SHORTNESS OF BREATH: 0
ALLERGIC/IMMUNOLOGIC NEGATIVE: 1
RECTAL PAIN: 0
APNEA: 0
EYE DISCHARGE: 0
EYE REDNESS: 0
EYE ITCHING: 0
DIARRHEA: 0
WHEEZING: 0
CONSTIPATION: 0
RHINORRHEA: 0
PHOTOPHOBIA: 0
VOMITING: 0
ANAL BLEEDING: 0
CHEST TIGHTNESS: 0
COUGH: 0
BLOOD IN STOOL: 0
FACIAL SWELLING: 0
STRIDOR: 0
SORE THROAT: 0

## 2023-02-04 NOTE — PROGRESS NOTES
Orlando Jiang (:  1950)     ASSESSMENT:  1. Right inguinal hernia    PLAN:  1. Schedule Colette Callahan for robotic repair right inguinal hernia with mesh; robotic repair left inguinal hernia with mesh if identified intraoperatively. 2. He will undergo pre-operative clearance per anesthesia guidelines with risk factors listed under the past medical history diagnosis & problem list.  3. The risks, benefits and alternatives were discussed with Colette Callahan including non-operative management. The pros and cons of robotic, laparoscopic and open techniques were discussed. The pros and cons of mesh insertion were discussed. All questions answered. He understands and wishes to proceed with surgical intervention. 4. Restrictions discussed with Colette Callahan and he expresses understanding. 5. He is advised to call back directly if there are further questions/concerns, or if his symptoms worsen prior to surgery. SUBJECTIVE/OBJECTIVE:    Chief Complaint   Patient presents with    Surgical Consult     New patient-referred by LAKSHMI West MD-Right inguinal hernia     HPI  Colette Callahan is a 66-year-old male who presents for initial evaluation secondary to a right inguinal hernia. He has known about this for years. 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 At that time the hernia was small and observation was undertaken. Over time the bulge has gradually increased in size. Now difficult to reduce. Mild to moderate discomfort. Worse with increased activity, lifting and bending over. Worsens throughout the day when he is very active. Improves with rest.  No issues on the left groin side that he is aware of. Occasionally have some radiation of discomfort down towards the right scrotal region. Still tolerating regular diet. No change in bowel function. No hematochezia or melena. No new urinary complaints. No nausea or vomiting.   No fever, chills or sweats. Review of Systems   Constitutional:  Negative for activity change, appetite change, chills, diaphoresis, fatigue, fever and unexpected weight change. HENT:  Negative for congestion, dental problem, drooling, ear discharge, ear pain, facial swelling, hearing loss, mouth sores, nosebleeds, postnasal drip, rhinorrhea, sinus pressure, sneezing, sore throat, tinnitus, trouble swallowing and voice change. Eyes:  Negative for photophobia, pain, discharge, redness, itching and visual disturbance. Respiratory:  Negative for apnea, cough, choking, chest tightness, shortness of breath, wheezing and stridor. Cardiovascular:  Negative for chest pain, palpitations and leg swelling. Gastrointestinal:  Positive for abdominal pain (right groin hernia). Negative for abdominal distention, anal bleeding, blood in stool, constipation, diarrhea, nausea, rectal pain and vomiting. Endocrine: Negative. Genitourinary:  Negative for decreased urine volume, difficulty urinating, dysuria, enuresis, flank pain, frequency, genital sores, hematuria, penile discharge, penile pain, penile swelling, scrotal swelling, testicular pain and urgency. Musculoskeletal:  Negative for arthralgias, back pain, gait problem, joint swelling, myalgias, neck pain and neck stiffness. Skin:  Negative for color change, pallor, rash and wound. Allergic/Immunologic: Negative. Neurological:  Negative for dizziness, tremors, seizures, syncope, facial asymmetry, speech difficulty, weakness, light-headedness, numbness and headaches. Hematological:  Negative for adenopathy. Does not bruise/bleed easily. Psychiatric/Behavioral:  Negative for agitation, behavioral problems, confusion, decreased concentration, dysphoric mood, hallucinations, self-injury, sleep disturbance and suicidal ideas. The patient is not nervous/anxious and is not hyperactive.       Past Medical History:   Diagnosis Date    Arthritis     Cancer ([facility] [address] 2017 colon dr Kirstin Pelaez ; meloma Dr Kianna Chaves    Chronic sinusitis     Hypertension     Ag    Vitamin D deficiency        Past Surgical History:   Procedure Laterality Date    ARM SURGERY Right 04/14/2021    EXCISION OF MELANOMA RIGHT UPPER ARM WITH SENTNEL LYMPH NODE BIOPSY RIGHT AXILLA performed by Madonna Pulido MD at [address] [place] [address]  2021    [place]    DILATATION, ESOPHAGUS      INGUINAL HERNIA REPAIR Left     30 years ago    KNEE SURGERY Left     OTHER SURGICAL HISTORY  04/04/2017    robotic ileocecectomy-Dr Espino    SKIN CANCER EXCISION  03/2021    melanoma removal Dr Kianna Chaves- also removed lymph nodes        Current Outpatient Medications   Medication Sig Dispense Refill    Magnesium 400 MG TABS Take 1 tablet by mouth daily      Cholecalciferol (D3 ADULT PO) Take 400 mg by mouth in the morning and at bedtime      amLODIPine (NORVASC) 10 MG tablet Take 10 mg by mouth daily      medical marijuana Take by mouth as needed. Gummys      HYDROcodone-acetaminophen (NORCO) 5-325 MG per tablet Take 1 tablet by mouth every 6 hours as needed for Pain for up to 5 days. Intended supply: 5 days. Take lowest dose possible to manage pain Max Daily Amount: 4 tablets 12 tablet 0    phenazopyridine (PYRIDIUM) 200 MG tablet Take 1 tablet by mouth 3 times daily as needed for Pain 15 tablet 1    ciprofloxacin (CIPRO) 500 MG tablet Take 1 tablet by mouth 2 times daily for 5 days 10 tablet 0    silodosin (RAPAFLO) 8 MG CAPS Take 1 capsule by mouth every evening 90 capsule 1    finasteride (PROSCAR) 5 MG tablet Take 1 tablet by mouth daily 90 tablet 3     No current facility-administered medications for this visit.        Allergies   Allergen Reactions    Penicillins      Unsure of reaction HAPPENED WHEN A WAS A TEENAGER OR EARLY TWENTY'S       Family History   Problem Relation Age of Onset    Heart Disease Father     Cancer Father        Social History     Socioeconomic History    Marital status:      Spouse name: Not on file    Number of children: Not on file    Years of education: Not on file    Highest education level: Not on file   Occupational History    Not on file   Tobacco Use    Smoking status: Former     Packs/day: 1.00     Years: 25.00     Pack years: 25.00     Types: Cigarettes     Start date: 1968     Quit date:      Years since quittin.1    Smokeless tobacco: Never   Vaping Use    Vaping Use: Some days    Substances: CBD   Substance and Sexual Activity    Alcohol use: Yes     Alcohol/week: 2.0 standard drinks     Types: 2 Standard drinks or equivalent per week     Comment: occasional    Drug use: Yes     Types: Marijuana Lucy Palm)     Comment: medical    Sexual activity: Not on file   Other Topics Concern    Not on file   Social History Narrative    Not on file     Social Determinants of Health     Financial Resource Strain: Not on file   Food Insecurity: Not on file   Transportation Needs: Not on file   Physical Activity: Not on file   Stress: Not on file   Social Connections: Not on file   Intimate Partner Violence: Not on file   Housing Stability: Not on file     Vitals:    23 0857   BP: 122/62   Site: Right Upper Arm   Position: Sitting   Cuff Size: Medium Adult   Pulse: 99   Resp: 18   Temp: 97.8 °F (36.6 °C)   TempSrc: Temporal   SpO2: 100%   Weight: 184 lb 6.4 oz (83.6 kg)   Height: 5' 9.5\" (1.765 m)     Body mass index is 26.84 kg/m². Wt Readings from Last 3 Encounters:   23 176 lb (79.8 kg)   23 184 lb 6.4 oz (83.6 kg)   01/10/23 185 lb (83.9 kg)     Physical Exam  Vitals reviewed. Constitutional:       General: He is not in acute distress. Appearance: He is well-developed. He is not diaphoretic. HENT:      Head: Normocephalic and atraumatic. Right Ear: External ear normal.      Left Ear: External ear normal.      Nose: Nose normal.   Eyes:      General: No scleral icterus. Right eye: No discharge. Left eye: No discharge. Conjunctiva/sclera: Conjunctivae normal.   Cardiovascular:      Rate and Rhythm: Normal rate and regular rhythm. Heart sounds: Normal heart sounds. Pulmonary:      Effort: Pulmonary effort is normal. No respiratory distress. Breath sounds: Normal breath sounds. No wheezing or rales. Chest:      Chest wall: No tenderness. Abdominal:      General: Bowel sounds are normal. There is no distension. Palpations: Abdomen is soft. There is no mass. Tenderness: There is no abdominal tenderness. There is no guarding or rebound. Hernia: A hernia is present. Hernia is present in the right inguinal area. There is no hernia in the left inguinal area. Musculoskeletal:         General: No tenderness. Normal range of motion. Cervical back: Normal range of motion and neck supple. Skin:     General: Skin is warm and dry. Coloration: Skin is not pale. Findings: No erythema or rash. Neurological:      Mental Status: He is alert and oriented to person, place, and time. Cranial Nerves: No cranial nerve deficit. Psychiatric:         Behavior: Behavior normal.         Thought Content:  Thought content normal.         Judgment: Judgment normal.     Lab Results   Component Value Date    WBC 5.2 12/05/2022    HGB 14.3 12/05/2022    HCT 43.5 12/05/2022    MCV 93.1 12/05/2022     12/05/2022     Lab Results   Component Value Date     12/05/2022    K 4.5 12/05/2022     12/05/2022    CO2 26 12/05/2022     Lab Results   Component Value Date    CREATININE 0.7 12/05/2022     Lab Results   Component Value Date    ALT 18 12/05/2022    AST 19 12/05/2022    ALKPHOS 94 12/05/2022    BILITOT 0.4 12/05/2022     Lab Results   Component Value Date    LIPASE 30.9 07/28/2021       Patient Active Problem List   Diagnosis    Colonic mass    Excessive sweating    Weight gain    Sneeze    Vision abnormalities    Palpitation    Heat intolerance    Aches    Tinea pedis of both feet Lightheaded    Numbness and tingling in both hands    Bruising    Sleep difficulties    Memory difficulties    Anxiety    Elevated lipids    Blood glucose elevated    Abnormal EKG    Essential hypertension    SOB (shortness of breath) on exertion    Dizziness on standing    History of syncope       An electronic signature was used to authenticate this note.     --Kendra Clark MD

## 2023-02-06 ENCOUNTER — NURSE ONLY (OUTPATIENT)
Dept: UROLOGY | Age: 73
End: 2023-02-06

## 2023-02-06 PROCEDURE — 99999 PR OFFICE/OUTPT VISIT,PROCEDURE ONLY: CPT

## 2023-02-06 NOTE — PROGRESS NOTES
Patient has given me verbal consent to perform koroma removal  Yes    30 cc of water deflated from koroma balloon. 22 Fr koroma removed without difficulty. Foreskin reduced back down? Yes      Pt will drink fluids and report to ER in 6-8 hours if patient unable to urinate. F/u with provider as scheduled. Patient was a post op Greenlight PVP cath removal today, patient was informed if he is unable to urinate and has to report to ER to have them notify [facility] prior to re-placing the koroma due to recent Greenlight PVP.

## 2023-02-16 ENCOUNTER — OFFICE VISIT (OUTPATIENT)
Dept: UROLOGY | Age: 73
End: 2023-02-16

## 2023-02-16 VITALS
WEIGHT: 183.5 LBS | DIASTOLIC BLOOD PRESSURE: 72 MMHG | SYSTOLIC BLOOD PRESSURE: 122 MMHG | HEIGHT: 69 IN | BODY MASS INDEX: 27.18 KG/M2

## 2023-02-16 DIAGNOSIS — N40.1 BENIGN LOCALIZED PROSTATIC HYPERPLASIA WITH LOWER URINARY TRACT SYMPTOMS (LUTS): Primary | ICD-10-CM

## 2023-02-16 LAB
BILIRUBIN URINE: NEGATIVE
BLOOD URINE, POC: ABNORMAL
CHARACTER, URINE: ABNORMAL
COLOR, URINE: ABNORMAL
GLUCOSE URINE: NEGATIVE MG/DL
KETONES, URINE: NEGATIVE
LEUKOCYTE CLUMPS, URINE: ABNORMAL
NITRITE, URINE: NEGATIVE
PH, URINE: 7 (ref 5–9)
POST VOID RESIDUAL (PVR): 154 ML
PROTEIN, URINE: 30 MG/DL
SPECIFIC GRAVITY, URINE: 1.01 (ref 1–1.03)
UROBILINOGEN, URINE: 0.2 EU/DL (ref 0–1)

## 2023-02-16 RX ORDER — SILODOSIN 8 MG/1
8 CAPSULE ORAL EVERY EVENING
Qty: 90 CAPSULE | Refills: 1 | Status: SHIPPED | OUTPATIENT
Start: 2023-02-16 | End: 2023-05-17

## 2023-02-16 ASSESSMENT — ENCOUNTER SYMPTOMS
VOMITING: 0
ABDOMINAL PAIN: 0
BACK PAIN: 0
NAUSEA: 0

## 2023-02-16 NOTE — PROGRESS NOTES
[facility] [address]  [address] [place] [address]  Dept: [phone]  Loc: [phone]    Visit Date: 2/16/2023        HPI:     Claribel Hampton is a 67 y.o. male who presents today for:  Chief Complaint   Patient presents with    Follow-up     Greenlight procedure     Benign Prostatic Hypertrophy       HPI  Pt seen in follow up for BPH. Pt has a hx of BPH with nocturia up to 10 x per night and urinary retention despite silodosin and finasteride. Underwent cystoscopy, greenlight photovaporization of the prostate 2/3/23 by Dr. Elvin Sifuentes. Austyn Ramirez reports he is doing well. Denies dysuria. Reports urinary frequency and urgency during the day is improved and he is down to waking 4 x per night to urinate. Denies fever/chills. Had some mild hematuria after riding his exercise bike but it resolved. No fever. Tolerating diet. Has upcoming hernia surgery. Current Outpatient Medications   Medication Sig Dispense Refill    silodosin (RAPAFLO) 8 MG CAPS Take 1 capsule by mouth every evening 90 capsule 1    finasteride (PROSCAR) 5 MG tablet Take 1 tablet by mouth daily 90 tablet 3    Magnesium 400 MG TABS Take 1 tablet by mouth daily      Cholecalciferol (D3 ADULT PO) Take 400 mg by mouth in the morning and at bedtime      amLODIPine (NORVASC) 10 MG tablet Take 10 mg by mouth daily      medical marijuana Take by mouth as needed. Gummys       No current facility-administered medications for this visit. Past Medical History  Gabriele Gipson  has a past medical history of Arthritis, Cancer ([address] Chronic sinusitis, Hypertension, and Vitamin D deficiency. Past Surgical History  The patient  has a past surgical history that includes Colonoscopy (2021); Inguinal hernia repair (Left); knee surgery (Left); Dilatation, esophagus; other surgical history (04/04/2017); Arm Surgery (Right, 04/14/2021);  Skin cancer excision (03/2021); and TURP (N/A, 2/3/2023). Family History  This patient's family history includes Cancer in his father; Heart Disease in his father. Social History  [place]  reports that he quit smoking about 21 years ago. His smoking use included cigarettes. He started smoking about 54 years ago. He has a 25.00 pack-year smoking history. He has never used smokeless tobacco. He reports current alcohol use of about 2.0 standard drinks per week. He reports current drug use. Drug: Marijuana Kari Rishi). Subjective:      Review of Systems   Constitutional:  Negative for activity change, appetite change, chills, diaphoresis, fatigue, fever and unexpected weight change. Gastrointestinal:  Negative for abdominal pain, nausea and vomiting. Genitourinary:  Negative for decreased urine volume, difficulty urinating, dysuria, flank pain, frequency, hematuria and urgency. Musculoskeletal:  Negative for back pain. Objective:   /72 (Site: Left Upper Arm, Position: Sitting)   Ht 5' 9\" (1.753 m)   Wt 183 lb 8 oz (83.2 kg)   BMI 27.10 kg/m²     Physical Exam  Vitals reviewed. Constitutional:       General: He is not in acute distress. Appearance: Normal appearance. He is well-developed. He is not ill-appearing or diaphoretic. HENT:      Head: Normocephalic and atraumatic. Right Ear: External ear normal.      Left Ear: External ear normal.      Nose: Nose normal.      Mouth/Throat:      Mouth: Mucous membranes are moist.   Eyes:      General: No scleral icterus. Right eye: No discharge. Left eye: No discharge. Neck:      Vascular: No JVD. Trachea: No tracheal deviation. Cardiovascular:      Rate and Rhythm: Normal rate and regular rhythm. Pulmonary:      Effort: Pulmonary effort is normal. No respiratory distress. Abdominal:      General: There is no distension. Tenderness: There is no abdominal tenderness. There is no right CVA tenderness or left CVA tenderness.    Musculoskeletal: General: Normal range of motion. Skin:     General: Skin is warm and dry. Neurological:      Mental Status: He is alert and oriented to person, place, and time. Mental status is at baseline. Psychiatric:         Mood and Affect: Mood normal.         Behavior: Behavior normal.         Thought Content: Thought content normal.       POC  Results for POC orders placed in visit on 02/16/23   POCT Urinalysis No Micro (Auto)   Result Value Ref Range    Glucose, Ur Negative NEGATIVE mg/dl    Bilirubin Urine Negative     Ketones, Urine Negative NEGATIVE    Specific Gravity, Urine 1.015 1.002 - 1.030    Blood, UA POC Moderate (A) NEGATIVE    pH, Urine 7.00 5.0 - 9.0    Protein, Urine 30 (A) NEGATIVE mg/dl    Urobilinogen, Urine 0.20 0.0 - 1.0 eu/dl    Nitrite, Urine Negative NEGATIVE    Leukocyte Clumps, Urine Small (A) NEGATIVE    Color, Urine Dark yellow (A) YELLOW-STRAW    Character, Urine Slightly Cloudy [id number]   poct post void residual   Result Value Ref Range    post void residual 154 ml         Patients recent PSA values are as follows  Lab Results   Component Value Date    PSA 0.86 01/18/2022        Recent BUN/Creatinine:  Lab Results   Component Value Date/Time    BUN 14 12/05/2022 08:30 AM    CREATININE 0.7 12/05/2022 08:30 AM         Assessment:   BPH with LUTS    Plan:     Pt doing well postoperatively. Reports LUTS and nocturia improved.  mls. Continue silodosin and finasteride at this time. Avoid heavy lifting, pushing, pulling, straining for 6 weeks after surgery. F/u with Dr. Silver Johnson in June as scheduled. F/u with Dr. Silver Johnson in June as scheduled.

## 2023-02-17 NOTE — H&P
Sweetie Powers (:  1950)      ASSESSMENT:  1. Right inguinal hernia     PLAN:  1. Schedule Mildred Keller for robotic repair right inguinal hernia with mesh; robotic repair left inguinal hernia with mesh if identified intraoperatively. 2. He will undergo pre-operative clearance per anesthesia guidelines with risk factors listed under the past medical history diagnosis & problem list.  3. The risks, benefits and alternatives were discussed with Mildred Keller including non-operative management. The pros and cons of robotic, laparoscopic and open techniques were discussed. The pros and cons of mesh insertion were discussed. All questions answered. He understands and wishes to proceed with surgical intervention. 4. Restrictions discussed with Mildred Keller and he expresses understanding. 5. He is advised to call back directly if there are further questions/concerns, or if his symptoms worsen prior to surgery. SUBJECTIVE/OBJECTIVE:          Chief Complaint   Patient presents with    Surgical Consult       New patient-referred by LAKSHMI Teran MD-Right inguinal hernia      HPI  Mildred Keller is a 40-year-old male who presents for initial evaluation secondary to a right inguinal hernia. He has known about this for years. 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 At that time the hernia was small and observation was undertaken. Over time the bulge has gradually increased in size. Now difficult to reduce. Mild to moderate discomfort. Worse with increased activity, lifting and bending over. Worsens throughout the day when he is very active. Improves with rest.  No issues on the left groin side that he is aware of. Occasionally have some radiation of discomfort down towards the right scrotal region. Still tolerating regular diet. No change in bowel function. No hematochezia or melena. No new urinary complaints. No nausea or vomiting.   No fever, chills or sweats. Review of Systems   Constitutional:  Negative for activity change, appetite change, chills, diaphoresis, fatigue, fever and unexpected weight change. HENT:  Negative for congestion, dental problem, drooling, ear discharge, ear pain, facial swelling, hearing loss, mouth sores, nosebleeds, postnasal drip, rhinorrhea, sinus pressure, sneezing, sore throat, tinnitus, trouble swallowing and voice change. Eyes:  Negative for photophobia, pain, discharge, redness, itching and visual disturbance. Respiratory:  Negative for apnea, cough, choking, chest tightness, shortness of breath, wheezing and stridor. Cardiovascular:  Negative for chest pain, palpitations and leg swelling. Gastrointestinal:  Positive for abdominal pain (right groin hernia). Negative for abdominal distention, anal bleeding, blood in stool, constipation, diarrhea, nausea, rectal pain and vomiting. Endocrine: Negative. Genitourinary:  Negative for decreased urine volume, difficulty urinating, dysuria, enuresis, flank pain, frequency, genital sores, hematuria, penile discharge, penile pain, penile swelling, scrotal swelling, testicular pain and urgency. Musculoskeletal:  Negative for arthralgias, back pain, gait problem, joint swelling, myalgias, neck pain and neck stiffness. Skin:  Negative for color change, pallor, rash and wound. Allergic/Immunologic: Negative. Neurological:  Negative for dizziness, tremors, seizures, syncope, facial asymmetry, speech difficulty, weakness, light-headedness, numbness and headaches. Hematological:  Negative for adenopathy. Does not bruise/bleed easily. Psychiatric/Behavioral:  Negative for agitation, behavioral problems, confusion, decreased concentration, dysphoric mood, hallucinations, self-injury, sleep disturbance and suicidal ideas. The patient is not nervous/anxious and is not hyperactive.        Past Medical History        Past Medical History:   Diagnosis Date    Arthritis      Cancer [facility]) 2017     colon dr Ramo Ferro ; Mariano Rivero    Chronic sinusitis      Hypertension       Ag    Vitamin D deficiency              Past Surgical History         Past Surgical History:   Procedure Laterality Date    ARM SURGERY Right 04/14/2021     EXCISION OF MELANOMA RIGHT UPPER ARM WITH SENTNEL LYMPH NODE BIOPSY RIGHT AXILLA performed by Richie Canela MD at [address] [place] [address]   2021     Neidich    DILATATION, ESOPHAGUS        INGUINAL HERNIA REPAIR Left       30 years ago    KNEE SURGERY Left      OTHER SURGICAL HISTORY   04/04/2017     robotic ileocecectomy-Dr Espino    SKIN CANCER EXCISION   03/2021     melanoma removal Dr Flaca Rivero- also removed lymph nodes             Current Facility-Administered Medications          Current Outpatient Medications   Medication Sig Dispense Refill    Magnesium 400 MG TABS Take 1 tablet by mouth daily        Cholecalciferol (D3 ADULT PO) Take 400 mg by mouth in the morning and at bedtime        amLODIPine (NORVASC) 10 MG tablet Take 10 mg by mouth daily        medical marijuana Take by mouth as needed. Gummys        HYDROcodone-acetaminophen (NORCO) 5-325 MG per tablet Take 1 tablet by mouth every 6 hours as needed for Pain for up to 5 days. Intended supply: 5 days. Take lowest dose possible to manage pain Max Daily Amount: 4 tablets 12 tablet 0    phenazopyridine (PYRIDIUM) 200 MG tablet Take 1 tablet by mouth 3 times daily as needed for Pain 15 tablet 1    ciprofloxacin (CIPRO) 500 MG tablet Take 1 tablet by mouth 2 times daily for 5 days 10 tablet 0    silodosin (RAPAFLO) 8 MG CAPS Take 1 capsule by mouth every evening 90 capsule 1    finasteride (PROSCAR) 5 MG tablet Take 1 tablet by mouth daily 90 tablet 3      No current facility-administered medications for this visit.                   Allergies   Allergen Reactions    Penicillins         Unsure of reaction HAPPENED WHEN A WAS A TEENAGER OR EARLY TWENTY'S Family History         Family History   Problem Relation Age of Onset    Heart Disease Father      Cancer Father              Social History               Socioeconomic History    Marital status:        Spouse name: Not on file    Number of children: Not on file    Years of education: Not on file    Highest education level: Not on file   Occupational History    Not on file   Tobacco Use    Smoking status: Former       Packs/day: 1.00       Years: 25.00       Pack years: 25.00       Types: Cigarettes       Start date: 1968       Quit date:        Years since quittin.1    Smokeless tobacco: Never   Vaping Use    Vaping Use: Some days    Substances: CBD   Substance and Sexual Activity    Alcohol use: Yes       Alcohol/week: 2.0 standard drinks       Types: 2 Standard drinks or equivalent per week       Comment: occasional    Drug use: Yes       Types: Marijuana Kareem Duran)       Comment: medical    Sexual activity: Not on file   Other Topics Concern    Not on file   Social History Narrative    Not on file      Social Determinants of Health      Financial Resource Strain: Not on file   Food Insecurity: Not on file   Transportation Needs: Not on file   Physical Activity: Not on file   Stress: Not on file   Social Connections: Not on file   Intimate Partner Violence: Not on file   Housing Stability: Not on file         Vitals       Vitals:     23 0857   BP: 122/62   Site: Right Upper Arm   Position: Sitting   Cuff Size: Medium Adult   Pulse: 99   Resp: 18   Temp: 97.8 °F (36.6 °C)   TempSrc: Temporal   SpO2: 100%   Weight: 184 lb 6.4 oz (83.6 kg)   Height: 5' 9.5\" (1.765 m)         Body mass index is 26.84 kg/m². Wt Readings from Last 3 Encounters:   23 176 lb (79.8 kg)   23 184 lb 6.4 oz (83.6 kg)   01/10/23 185 lb (83.9 kg)      Physical Exam  Vitals reviewed. Constitutional:       General: He is not in acute distress. Appearance: He is well-developed.  He is not diaphoretic. HENT:      Head: Normocephalic and atraumatic. Right Ear: External ear normal.      Left Ear: External ear normal.      Nose: Nose normal.   Eyes:      General: No scleral icterus. Right eye: No discharge. Left eye: No discharge. Conjunctiva/sclera: Conjunctivae normal.   Cardiovascular:      Rate and Rhythm: Normal rate and regular rhythm. Heart sounds: Normal heart sounds. Pulmonary:      Effort: Pulmonary effort is normal. No respiratory distress. Breath sounds: Normal breath sounds. No wheezing or rales. Chest:      Chest wall: No tenderness. Abdominal:      General: Bowel sounds are normal. There is no distension. Palpations: Abdomen is soft. There is no mass. Tenderness: There is no abdominal tenderness. There is no guarding or rebound. Hernia: A hernia is present. Hernia is present in the right inguinal area. There is no hernia in the left inguinal area. Musculoskeletal:         General: No tenderness. Normal range of motion. Cervical back: Normal range of motion and neck supple. Skin:     General: Skin is warm and dry. Coloration: Skin is not pale. Findings: No erythema or rash. Neurological:      Mental Status: He is alert and oriented to person, place, and time. Cranial Nerves: No cranial nerve deficit. Psychiatric:         Behavior: Behavior normal.         Thought Content:  Thought content normal.         Judgment: Judgment normal.            Lab Results   Component Value Date     WBC 5.2 12/05/2022     HGB 14.3 12/05/2022     HCT 43.5 12/05/2022     MCV 93.1 12/05/2022      12/05/2022            Lab Results   Component Value Date      12/05/2022     K 4.5 12/05/2022      12/05/2022     CO2 26 12/05/2022            Lab Results   Component Value Date     CREATININE 0.7 12/05/2022            Lab Results   Component Value Date     ALT 18 12/05/2022     AST 19 12/05/2022     ALKPHOS 94 12/05/2022     BILITOT 0.4 12/05/2022            Lab Results   Component Value Date     LIPASE 30.9 07/28/2021             Patient Active Problem List   Diagnosis    Colonic mass    Excessive sweating    Weight gain    Sneeze    Vision abnormalities    Palpitation    Heat intolerance    Aches    Tinea pedis of both feet    Lightheaded    Numbness and tingling in both hands    Bruising    Sleep difficulties    Memory difficulties    Anxiety    Elevated lipids    Blood glucose elevated    Abnormal EKG    Essential hypertension    SOB (shortness of breath) on exertion    Dizziness on standing    History of syncope         An electronic signature was used to authenticate this note.      --Olivia Tafoya MD

## 2023-02-18 LAB — BACTERIA UR CULT: NORMAL

## 2023-02-23 ENCOUNTER — HOSPITAL ENCOUNTER (OUTPATIENT)
Age: 73
Setting detail: OUTPATIENT SURGERY
Discharge: HOME | End: 2023-02-23
Attending: SURGERY | Admitting: SURGERY
Payer: MEDICARE

## 2023-02-23 ENCOUNTER — ANESTHESIA EVENT (OUTPATIENT)
Dept: OPERATING ROOM | Age: 73
End: 2023-02-23
Payer: MEDICARE

## 2023-02-23 ENCOUNTER — ANESTHESIA (OUTPATIENT)
Dept: OPERATING ROOM | Age: 73
End: 2023-02-23
Payer: MEDICARE

## 2023-02-23 VITALS
RESPIRATION RATE: 16 BRPM | OXYGEN SATURATION: 98 % | WEIGHT: 177 LBS | SYSTOLIC BLOOD PRESSURE: 135 MMHG | HEART RATE: 92 BPM | TEMPERATURE: 97.4 F | BODY MASS INDEX: 26.22 KG/M2 | DIASTOLIC BLOOD PRESSURE: 69 MMHG | HEIGHT: 69 IN

## 2023-02-23 DIAGNOSIS — K40.90 RIGHT INGUINAL HERNIA: ICD-10-CM

## 2023-02-23 DIAGNOSIS — K40.90 RIGHT INGUINAL HERNIA: Primary | ICD-10-CM

## 2023-02-23 LAB — POTASSIUM SERPL-SCNC: 4.5 MEQ/L (ref 3.5–5.2)

## 2023-02-23 PROCEDURE — 84132 ASSAY OF SERUM POTASSIUM: CPT

## 2023-02-23 PROCEDURE — 2500000003 HC RX 250 WO HCPCS: Performed by: NURSE ANESTHETIST, CERTIFIED REGISTERED

## 2023-02-23 PROCEDURE — 6360000002 HC RX W HCPCS: Performed by: NURSE PRACTITIONER

## 2023-02-23 PROCEDURE — 3600000009 HC SURGERY ROBOT BASE: Performed by: SURGERY

## 2023-02-23 PROCEDURE — 7100000010 HC PHASE II RECOVERY - FIRST 15 MIN: Performed by: SURGERY

## 2023-02-23 PROCEDURE — 3700000001 HC ADD 15 MINUTES (ANESTHESIA): Performed by: SURGERY

## 2023-02-23 PROCEDURE — C1781 MESH (IMPLANTABLE): HCPCS | Performed by: SURGERY

## 2023-02-23 PROCEDURE — 2580000003 HC RX 258

## 2023-02-23 PROCEDURE — 7100000000 HC PACU RECOVERY - FIRST 15 MIN: Performed by: SURGERY

## 2023-02-23 PROCEDURE — 6370000000 HC RX 637 (ALT 250 FOR IP): Performed by: SURGERY

## 2023-02-23 PROCEDURE — 2500000003 HC RX 250 WO HCPCS: Performed by: SURGERY

## 2023-02-23 PROCEDURE — 3600000019 HC SURGERY ROBOT ADDTL 15MIN: Performed by: SURGERY

## 2023-02-23 PROCEDURE — S2900 ROBOTIC SURGICAL SYSTEM: HCPCS | Performed by: SURGERY

## 2023-02-23 PROCEDURE — 6360000002 HC RX W HCPCS: Performed by: NURSE ANESTHETIST, CERTIFIED REGISTERED

## 2023-02-23 PROCEDURE — 3700000000 HC ANESTHESIA ATTENDED CARE: Performed by: SURGERY

## 2023-02-23 PROCEDURE — 7100000011 HC PHASE II RECOVERY - ADDTL 15 MIN: Performed by: SURGERY

## 2023-02-23 PROCEDURE — 7100000001 HC PACU RECOVERY - ADDTL 15 MIN: Performed by: SURGERY

## 2023-02-23 PROCEDURE — 2709999900 HC NON-CHARGEABLE SUPPLY: Performed by: SURGERY

## 2023-02-23 PROCEDURE — 36415 COLL VENOUS BLD VENIPUNCTURE: CPT

## 2023-02-23 DEVICE — MESH CS RIGHT LARGE 10CM X 16CM: Type: IMPLANTABLE DEVICE | Site: ABDOMEN | Status: FUNCTIONAL

## 2023-02-23 RX ORDER — ONDANSETRON 2 MG/ML
4 INJECTION INTRAMUSCULAR; INTRAVENOUS EVERY 6 HOURS PRN
Status: DISCONTINUED | OUTPATIENT
Start: 2023-02-23 | End: 2023-02-23 | Stop reason: HOSPADM

## 2023-02-23 RX ORDER — FENTANYL CITRATE 50 UG/ML
50 INJECTION, SOLUTION INTRAMUSCULAR; INTRAVENOUS EVERY 5 MIN PRN
Status: DISCONTINUED | OUTPATIENT
Start: 2023-02-23 | End: 2023-02-23 | Stop reason: HOSPADM

## 2023-02-23 RX ORDER — MORPHINE SULFATE 2 MG/ML
4 INJECTION, SOLUTION INTRAMUSCULAR; INTRAVENOUS
Status: DISCONTINUED | OUTPATIENT
Start: 2023-02-23 | End: 2023-02-23 | Stop reason: HOSPADM

## 2023-02-23 RX ORDER — SUCCINYLCHOLINE/SOD CL,ISO/PF 200MG/10ML
SYRINGE (ML) INTRAVENOUS PRN
Status: DISCONTINUED | OUTPATIENT
Start: 2023-02-23 | End: 2023-02-23 | Stop reason: SDUPTHER

## 2023-02-23 RX ORDER — SODIUM CHLORIDE 0.9 % (FLUSH) 0.9 %
5-40 SYRINGE (ML) INJECTION PRN
Status: DISCONTINUED | OUTPATIENT
Start: 2023-02-23 | End: 2023-02-23 | Stop reason: HOSPADM

## 2023-02-23 RX ORDER — HYDROCODONE BITARTRATE AND ACETAMINOPHEN 5; 325 MG/1; MG/1
1-2 TABLET ORAL EVERY 6 HOURS PRN
Qty: 20 TABLET | Refills: 0 | Status: SHIPPED | OUTPATIENT
Start: 2023-02-23 | End: 2023-03-01

## 2023-02-23 RX ORDER — SODIUM CHLORIDE 0.9 % (FLUSH) 0.9 %
5-40 SYRINGE (ML) INJECTION EVERY 12 HOURS SCHEDULED
Status: DISCONTINUED | OUTPATIENT
Start: 2023-02-23 | End: 2023-02-23 | Stop reason: HOSPADM

## 2023-02-23 RX ORDER — DEXAMETHASONE SODIUM PHOSPHATE 10 MG/ML
INJECTION, EMULSION INTRAMUSCULAR; INTRAVENOUS PRN
Status: DISCONTINUED | OUTPATIENT
Start: 2023-02-23 | End: 2023-02-23 | Stop reason: SDUPTHER

## 2023-02-23 RX ORDER — MORPHINE SULFATE 2 MG/ML
2 INJECTION, SOLUTION INTRAMUSCULAR; INTRAVENOUS
Status: DISCONTINUED | OUTPATIENT
Start: 2023-02-23 | End: 2023-02-23 | Stop reason: HOSPADM

## 2023-02-23 RX ORDER — PROPOFOL 10 MG/ML
INJECTION, EMULSION INTRAVENOUS PRN
Status: DISCONTINUED | OUTPATIENT
Start: 2023-02-23 | End: 2023-02-23 | Stop reason: SDUPTHER

## 2023-02-23 RX ORDER — SODIUM CHLORIDE 9 MG/ML
INJECTION, SOLUTION INTRAVENOUS CONTINUOUS
Status: DISCONTINUED | OUTPATIENT
Start: 2023-02-23 | End: 2023-02-23 | Stop reason: HOSPADM

## 2023-02-23 RX ORDER — HYDROCODONE BITARTRATE AND ACETAMINOPHEN 5; 325 MG/1; MG/1
1-2 TABLET ORAL EVERY 6 HOURS PRN
Qty: 20 TABLET | Refills: 0 | Status: CANCELLED | OUTPATIENT
Start: 2023-02-23 | End: 2023-03-01

## 2023-02-23 RX ORDER — MIDAZOLAM HYDROCHLORIDE 1 MG/ML
INJECTION INTRAMUSCULAR; INTRAVENOUS PRN
Status: DISCONTINUED | OUTPATIENT
Start: 2023-02-23 | End: 2023-02-23 | Stop reason: SDUPTHER

## 2023-02-23 RX ORDER — KETOROLAC TROMETHAMINE 10 MG/1
10 TABLET, FILM COATED ORAL EVERY 8 HOURS PRN
Qty: 15 TABLET | Refills: 0 | Status: SHIPPED | OUTPATIENT
Start: 2023-02-23

## 2023-02-23 RX ORDER — BUPIVACAINE HYDROCHLORIDE 2.5 MG/ML
INJECTION, SOLUTION EPIDURAL; INFILTRATION; INTRACAUDAL PRN
Status: DISCONTINUED | OUTPATIENT
Start: 2023-02-23 | End: 2023-02-23 | Stop reason: ALTCHOICE

## 2023-02-23 RX ORDER — ROCURONIUM BROMIDE 10 MG/ML
INJECTION, SOLUTION INTRAVENOUS PRN
Status: DISCONTINUED | OUTPATIENT
Start: 2023-02-23 | End: 2023-02-23 | Stop reason: SDUPTHER

## 2023-02-23 RX ORDER — LABETALOL HYDROCHLORIDE 5 MG/ML
10 INJECTION, SOLUTION INTRAVENOUS
Status: DISCONTINUED | OUTPATIENT
Start: 2023-02-23 | End: 2023-02-23 | Stop reason: HOSPADM

## 2023-02-23 RX ORDER — OXYCODONE HYDROCHLORIDE 5 MG/1
10 TABLET ORAL EVERY 4 HOURS PRN
Status: DISCONTINUED | OUTPATIENT
Start: 2023-02-23 | End: 2023-02-23 | Stop reason: HOSPADM

## 2023-02-23 RX ORDER — SODIUM CHLORIDE 9 MG/ML
INJECTION, SOLUTION INTRAVENOUS PRN
Status: DISCONTINUED | OUTPATIENT
Start: 2023-02-23 | End: 2023-02-23 | Stop reason: HOSPADM

## 2023-02-23 RX ORDER — ONDANSETRON 4 MG/1
4 TABLET, ORALLY DISINTEGRATING ORAL EVERY 8 HOURS PRN
Status: DISCONTINUED | OUTPATIENT
Start: 2023-02-23 | End: 2023-02-23 | Stop reason: HOSPADM

## 2023-02-23 RX ORDER — OXYCODONE HYDROCHLORIDE 5 MG/1
5 TABLET ORAL EVERY 4 HOURS PRN
Status: DISCONTINUED | OUTPATIENT
Start: 2023-02-23 | End: 2023-02-23 | Stop reason: HOSPADM

## 2023-02-23 RX ORDER — ONDANSETRON 2 MG/ML
INJECTION INTRAMUSCULAR; INTRAVENOUS PRN
Status: DISCONTINUED | OUTPATIENT
Start: 2023-02-23 | End: 2023-02-23 | Stop reason: SDUPTHER

## 2023-02-23 RX ORDER — GLYCOPYRROLATE 0.2 MG/ML
INJECTION INTRAMUSCULAR; INTRAVENOUS PRN
Status: DISCONTINUED | OUTPATIENT
Start: 2023-02-23 | End: 2023-02-23 | Stop reason: SDUPTHER

## 2023-02-23 RX ORDER — FENTANYL CITRATE 50 UG/ML
INJECTION, SOLUTION INTRAMUSCULAR; INTRAVENOUS PRN
Status: DISCONTINUED | OUTPATIENT
Start: 2023-02-23 | End: 2023-02-23 | Stop reason: SDUPTHER

## 2023-02-23 RX ORDER — HYDRALAZINE HYDROCHLORIDE 20 MG/ML
10 INJECTION INTRAMUSCULAR; INTRAVENOUS
Status: DISCONTINUED | OUTPATIENT
Start: 2023-02-23 | End: 2023-02-23 | Stop reason: HOSPADM

## 2023-02-23 RX ADMIN — PROPOFOL 150 MG: 10 INJECTION, EMULSION INTRAVENOUS at 08:51

## 2023-02-23 RX ADMIN — MIDAZOLAM 2 MG: 1 INJECTION INTRAMUSCULAR; INTRAVENOUS at 08:45

## 2023-02-23 RX ADMIN — SODIUM CHLORIDE: 9 INJECTION, SOLUTION INTRAVENOUS at 08:19

## 2023-02-23 RX ADMIN — FENTANYL CITRATE 100 MCG: 50 INJECTION, SOLUTION INTRAMUSCULAR; INTRAVENOUS at 08:45

## 2023-02-23 RX ADMIN — GLYCOPYRROLATE 0.2 MG: 0.2 INJECTION, SOLUTION INTRAMUSCULAR; INTRAVENOUS at 08:54

## 2023-02-23 RX ADMIN — Medication 120 MG: at 08:51

## 2023-02-23 RX ADMIN — SUGAMMADEX 200 MG: 100 INJECTION, SOLUTION INTRAVENOUS at 09:42

## 2023-02-23 RX ADMIN — DEXAMETHASONE SODIUM PHOSPHATE 10 MG: 10 INJECTION, EMULSION INTRAMUSCULAR; INTRAVENOUS at 08:58

## 2023-02-23 RX ADMIN — FENTANYL CITRATE 50 MCG: 50 INJECTION, SOLUTION INTRAMUSCULAR; INTRAVENOUS at 09:46

## 2023-02-23 RX ADMIN — ONDANSETRON 4 MG: 2 INJECTION INTRAMUSCULAR; INTRAVENOUS at 08:58

## 2023-02-23 RX ADMIN — Medication 2000 MG: at 08:49

## 2023-02-23 RX ADMIN — FENTANYL CITRATE 50 MCG: 50 INJECTION, SOLUTION INTRAMUSCULAR; INTRAVENOUS at 09:35

## 2023-02-23 RX ADMIN — OXYCODONE 5 MG: 5 TABLET ORAL at 11:11

## 2023-02-23 RX ADMIN — ROCURONIUM BROMIDE 30 MG: 10 INJECTION, SOLUTION INTRAVENOUS at 08:56

## 2023-02-23 RX ADMIN — Medication 100 MG: at 08:51

## 2023-02-23 ASSESSMENT — PAIN DESCRIPTION - LOCATION
LOCATION: ABDOMEN

## 2023-02-23 ASSESSMENT — PAIN DESCRIPTION - ONSET: ONSET: ON-GOING

## 2023-02-23 ASSESSMENT — PAIN DESCRIPTION - FREQUENCY
FREQUENCY: CONTINUOUS

## 2023-02-23 ASSESSMENT — PAIN DESCRIPTION - DESCRIPTORS
DESCRIPTORS: ACHING
DESCRIPTORS: SORE;ACHING
DESCRIPTORS: ACHING

## 2023-02-23 ASSESSMENT — PAIN SCALES - GENERAL
PAINLEVEL_OUTOF10: 4
PAINLEVEL_OUTOF10: 0
PAINLEVEL_OUTOF10: 1
PAINLEVEL_OUTOF10: 4
PAINLEVEL_OUTOF10: 2
PAINLEVEL_OUTOF10: 1

## 2023-02-23 ASSESSMENT — PAIN - FUNCTIONAL ASSESSMENT: PAIN_FUNCTIONAL_ASSESSMENT: 0-10

## 2023-02-23 ASSESSMENT — PAIN DESCRIPTION - PAIN TYPE
TYPE: SURGICAL PAIN
TYPE: SURGICAL PAIN

## 2023-02-23 ASSESSMENT — PAIN DESCRIPTION - ORIENTATION
ORIENTATION: MID

## 2023-02-23 NOTE — ANESTHESIA POSTPROCEDURE EVALUATION
Department of Anesthesiology  Postprocedure Note    Patient: Romulo Gooden  MRN: [id number]  [place]: 1950  Date of evaluation: 2/23/2023      Procedure Summary     Date: 02/23/23 Room / Location: [address] MEAGAN Lau    Anesthesia Start: Ignacia Michaels Anesthesia Stop: 8952    Procedure: Robotic Right Inguinal Hernia Repair with Mesh (Right: Abdomen) Diagnosis:       Right inguinal hernia      (Right inguinal hernia [K40.90])    Surgeons: Renaldo Kong MD Responsible Provider: Elliott Matias DO    Anesthesia Type: general ASA Status: 2          Anesthesia Type: No value filed. Liat Phase I: Liat Score: 10    Liat Phase II:        Anesthesia Post Evaluation    Comments: [place] 60  POST-ANESTHESIA NOTE       Name:  Romulo Gooden                                         Age:  67 y.o.   MRN:  [id number]      Last Vitals:  BP (!) 143/66   Pulse 79   Temp 97.4 °F (36.3 °C) (Temporal)   Resp 16   Ht 5' 9\" (1.753 m)   Wt 177 lb (80.3 kg)   SpO2 97%   BMI 26.14 kg/m²   Patient Vitals in the past 4 hrs:  02/23/23 1027, BP:(!) 143/66, Temp:97.4 °F (36.3 °C), Temp src:Temporal, Pulse:79, Resp:16, SpO2:97 %  02/23/23 1020, BP:129/63, Pulse:74, Resp:14, SpO2:96 %  02/23/23 1015, BP:127/65, Pulse:77, Resp:13, SpO2:97 %  02/23/23 1010, BP:130/64, Pulse:81, Resp:14, SpO2:98 %  02/23/23 1005, BP:129/63, Pulse:78, Resp:13, SpO2:98 %  02/23/23 1000, BP:130/62, Pulse:79, Resp:15, SpO2:97 %  02/23/23 0955, BP:136/65, Pulse:86, Resp:16, SpO2:98 %  02/23/23 0953, BP:(!) 152/70, Temp:98.2 °F (36.8 °C), Temp src:Temporal, Pulse:87, Resp:16, SpO2:99 %  02/23/23 0742, Height:5' 9\" (1.753 m), Weight:177 lb (80.3 kg)  02/23/23 0739, BP:(!) 141/76, Temp:97.7 °F (36.5 °C), Pulse:85, Resp:18, SpO2:98 %    Level of Consciousness:  Awake    Respiratory:  Stable    Oxygen Saturation:  Stable    Cardiovascular:  Stable    Hydration:  Adequate    PONV:  Stable    Post-op Pain:  Adequate analgesia    Post-op Assessment:  No apparent anesthetic complications    Additional Follow-Up / Treatment / Comment:  None    Bernabe Sherwood.  [address], DO  February 23, 2023   10:44 AM

## 2023-02-23 NOTE — ANESTHESIA PRE PROCEDURE
Department of Anesthesiology  Preprocedure Note       Name:  Christophe Rodriguez   Age:  67 y.o.  :  1950                                          MRN:  [id number]         Date:  2023      Surgeon: Gerber Villafuerte):  Felicia David MD    Procedure: Procedure(s):  Robotic Right Inguinal Hernia Repair with Mesh possible Left possible Open    Medications prior to admission:   Prior to Admission medications    Medication Sig Start Date End Date Taking? Authorizing Provider   silodosin (RAPAFLO) 8 MG CAPS Take 1 capsule by mouth every evening 23  CICI De La Garza CNP   finasteride (PROSCAR) 5 MG tablet Take 1 tablet by mouth daily 22  CICI Auguste CNP   Magnesium 400 MG TABS Take 1 tablet by mouth daily    Historical Provider, MD   Cholecalciferol (D3 ADULT PO) Take 400 mg by mouth in the morning and at bedtime    Historical Provider, MD   amLODIPine (NORVASC) 10 MG tablet Take 10 mg by mouth daily    Historical Provider, MD   medical marijuana Take by mouth as needed. Bc    Historical Provider, MD       Current medications:    Current Facility-Administered Medications   Medication Dose Route Frequency Provider Last Rate Last Admin    ceFAZolin (ANCEF) 2000 mg in 0.9% sodium chloride 50 mL IVPB  2,000 mg IntraVENous On Call to [address] [place] [address], APRN - CNP        0.9 % sodium chloride infusion   IntraVENous Continuous Barbie Robles  mL/hr at  0819 New Bag at 23 0819       Allergies:     Allergies   Allergen Reactions    Penicillins      Unsure of reaction HAPPENED WHEN A WAS A TEENAGER OR EARLY TWENTY'S       Problem List:    Patient Active Problem List   Diagnosis Code    Colonic mass K63.89    Excessive sweating R61    Weight gain R63.5    Sneeze R06.7    Vision abnormalities H53.9    Palpitation R00.2    Heat intolerance R68.89    Aches R52    Tinea pedis of both feet B35.3    Lightheaded R42    Numbness and tingling in both hands R20.0, R20.2    Bruising T14. 8XXA    Sleep difficulties G47.9    Memory difficulties R41.3    Anxiety F41.9    Elevated lipids E78.5    Blood glucose elevated R73.9    Abnormal EKG R94.31    Essential hypertension I10    SOB (shortness of breath) on exertion R06.02    Dizziness on standing R42    History of syncope Z87.898       Past Medical History:        Diagnosis Date    Arthritis     Cancer [facility])     colon dr Lizzie Mata ; [facility] [address] Luisa Dao    Chronic sinusitis     Hypertension     Ag    Vitamin D deficiency        Past Surgical History:        Procedure Laterality Date    ARM SURGERY Right 2021    EXCISION OF MELANOMA RIGHT UPPER ARM WITH SENTNEL LYMPH NODE BIOPSY RIGHT AXILLA performed by Khushboo Wilson MD at [address] [place] [address]      Neidich    DILATATION, ESOPHAGUS      INGUINAL HERNIA REPAIR Left     30 years ago    KNEE SURGERY Left     OTHER SURGICAL HISTORY  2017    robotic ileocecectomy-Dr Espino    SKIN CANCER EXCISION  2021    melanoma removal Dr Luisa Dao- also removed lymph nodes     TURP N/A 2/3/2023    CYSTO, GREENLIGHT PHOTO VAPORIZATION OF PROSTATE performed by Jamir Benítez MD at [place] [address] History:    Social History     Tobacco Use    Smoking status: Former     Packs/day: 1.00     Years: 25.00     Pack years: 25.00     Types: Cigarettes     Start date: 1968     Quit date:      Years since quittin.1    Smokeless tobacco: Never   Substance Use Topics    Alcohol use:  Yes     Alcohol/week: 2.0 standard drinks     Types: 2 Standard drinks or equivalent per week     Comment: occasional                                Counseling given: Not Answered      Vital Signs (Current):   Vitals:    23 0739 23 0742   BP: (!) 141/76    Pulse: 85    Resp: 18    Temp: 97.7 °F (36.5 °C)    SpO2: 98%    Weight:  177 lb (80.3 kg)   Height:  5' 9\" (1.753 m)                                              BP Readings from Last 3 Encounters:   02/23/23 (!) 141/76   02/16/23 122/72   02/03/23 127/71       NPO Status: Time of last liquid consumption: 0600                        Time of last solid consumption: 2100                        Date of last liquid consumption: 02/23/23                        Date of last solid food consumption: 02/22/23    BMI:   Wt Readings from Last 3 Encounters:   02/23/23 177 lb (80.3 kg)   02/16/23 183 lb 8 oz (83.2 kg)   02/03/23 176 lb (79.8 kg)     Body mass index is 26.14 kg/m². CBC:   Lab Results   Component Value Date/Time    WBC 5.2 12/05/2022 08:30 AM    RBC 4.67 12/05/2022 08:30 AM    RBC 4.88 11/01/2019 08:15 AM    HGB 14.3 12/05/2022 08:30 AM    HCT 43.5 12/05/2022 08:30 AM    MCV 93.1 12/05/2022 08:30 AM    RDW 14.1 11/01/2019 08:15 AM     12/05/2022 08:30 AM       CMP:   Lab Results   Component Value Date/Time     12/05/2022 08:30 AM    K 4.5 02/23/2023 07:43 AM    K 4.2 07/28/2021 02:09 PM     12/05/2022 08:30 AM    CO2 26 12/05/2022 08:30 AM    BUN 14 12/05/2022 08:30 AM    CREATININE 0.7 12/05/2022 08:30 AM    LABGLOM >60 12/05/2022 08:24 AM    GLUCOSE 100 12/05/2022 08:30 AM    GLUCOSE 93 02/18/2021 01:45 PM    PROT 7.5 12/05/2022 08:30 AM    CALCIUM 9.8 12/05/2022 08:30 AM    BILITOT 0.4 12/05/2022 08:30 AM    BILITOT NEGATIVE 05/20/2019 11:00 AM    ALKPHOS 94 12/05/2022 08:30 AM    AST 19 12/05/2022 08:30 AM    ALT 18 12/05/2022 08:30 AM       POC Tests: No results for input(s): POCGLU, POCNA, POCK, POCCL, POCBUN, POCHEMO, POCHCT in the last 72 hours.     Coags: No results found for: PROTIME, INR, APTT    HCG (If Applicable): No results found for: PREGTESTUR, PREGSERUM, HCG, HCGQUANT     ABGs: No results found for: PHART, PO2ART, TDT7LIN, AWL4YDG, BEART, M5JYRBRA     Type & Screen (If Applicable):  Lab Results   Component Value Date    LAB POS 04/04/2017       Drug/Infectious Status (If Applicable):  No results found for: HIV, HEPCAB    COVID-19 Screening (If Applicable):   Lab Results   Component Value Date/Time    COVID19 NOT DETECTED 07/28/2021 05:30 PM    COVID19 Not Detected 04/07/2021 01:15 PM           Anesthesia Evaluation  Patient summary reviewed  Airway: Mallampati: II  TM distance: >3 FB   Neck ROM: full  Mouth opening: > = 3 FB   Dental:          Pulmonary:                              Cardiovascular:    (+) hypertension:, BEGUM:,       ECG reviewed        Stress test reviewed                Neuro/Psych:               GI/Hepatic/Renal:             Endo/Other:                     Abdominal:             Vascular: Other Findings:           Anesthesia Plan      general     ASA 2       Induction: intravenous. MIPS: Postoperative opioids intended and Prophylactic antiemetics administered. Anesthetic plan and risks discussed with patient and spouse. Plan discussed with CAROLINA. Estrella Nuñez.  [address] [facility]   2/23/2023

## 2023-02-23 NOTE — BRIEF OP NOTE
Brief Postoperative Note      Patient: Tessa Kelly  YOB: 1950  MRN: [id number]    Date of Procedure: 2/23/2023    Pre-Op Diagnosis: Right inguinal hernia [K40.90]    Post-Op Diagnosis: Same       Procedure(s):  Robotic Right Inguinal Hernia Repair with Mesh    Surgeon(s):  Gerrianne Aase, MD    Assistant:  First Assistant: Sasha Urias RN    Anesthesia: General/local    Estimated Blood Loss (mL): 5 ml    Complications: None    Specimens:   * No specimens in log *    Implants:  Implant Name Type Inv.  Item Serial No.  Lot No. LRB No. Used Action   MESH CS RIGHT LARGE 10CM X 16CM - [id number]  MESH CS RIGHT LARGE 10CM X 16CM  [id number]-  Right 1 Implanted         Drains:   [REMOVED] Urinary Catheter 02/03/23 3 Way (Removed)   Urine Color Yellow 02/03/23 1242   Urine Appearance Clear 02/03/23 1242       Findings: as above - see op note for details    Electronically signed by Gerrianne Aase, MD on 2/23/2023 at 9:48 AM

## 2023-02-23 NOTE — TELEPHONE ENCOUNTER
Received call from pt stating [facility] does not have any [place].  I called [facility] and they have some at the [email or web address]. Please send new script for pt.

## 2023-02-23 NOTE — PROGRESS NOTES
ADMITTED TO [facility] AND ORIENTED TO UNIT. SCDS ON. FALL AND ALLERGY BANDS ON. PT VERBALIZED APPROVAL FOR FIRST NAME, LAST INITIAL AND PHYSICIAN NAME ON UNIT WHITEBOARD.

## 2023-02-23 NOTE — DISCHARGE INSTRUCTIONS
DR. Opal Rodrigues DISCHARGE INSTRUCTIONS    Pt Name: Juvenal Wasserman  Medical Record Number: [id number]  Today's Date: 2/23/2023    GENERAL ANESTHESIA OR SEDATION  1. Do not drive or operate hazardous machinery for 24 hours. 2. Do not make important business or personal decisions for 24 hours. 3. Do not drink alcoholic beverages or use tobacco for 24 hours. ACTIVITY INSTRUCTIONS:  [] Rest today. Resume light to normal activity tomorrow. [x] You may resume normal activity tomorrow. Do not engage in strenuous activity that may place stress on your incision. [x] Do not drive for 3-5 days and avoid heavy lifting, tugging, pullings greater than 10-20 lbs until seen in the office. DIET INSTRUCTIONS:  [x]Begin with clear liquids. If not nauseated, may increase to a low-fat diet when you desire. Greasy and spicy foods are not advised. [x]Regular diet as tolerated. []Other:     MEDICATIONS  [x]Prescription sent with you to be used as directed. []Valium   [x]Norco   []Percocet   [x]Toradol   []Oxycontin     Do not drink alcohol or drive while taking these medications. You may experience dizziness or drowsiness with these medications. You may also experience constipation which can be relieved with stool softners or laxatives. - Take Colace 100 mg 1-2 tabs daily as needed for constipation  - Take MiraLax 1-2 cap fulls daily as needed for constipation    [x]You may resume your daily prescription medication schedule unless otherwise specified. []Do not take 325mg Aspirin or other blood thinners such as Coumadin or Plavix for 5 days. **Pain medication at discharge - use only as prescribed- refills may be available to you at your follow up appointments if needed and warranted.   Narcotics should be used for only short term and we highly encouraged our patients to wean off appropriately and use other means for pain such as non pharmacologic measures and over the counter tylenol or ibuprofen if no restrictions apply. We do  know that surgical pain is real and will not hesitate to help eliminate some of your discomfort. However we will not be able to completely make you pain free and it is important to determine what pain level is tolerable for you **    WOUND/DRESSING INSTRUCTIONS:  Always ensure you and your care giver clean hands before and after caring for the wound. [x] Keep dressing clean and dry for 24 hours. Change when soiled or wet. [x] Allow steri-strips to fall off on their own. [x] Ice operative site as needed for 20 minutes 4 times a day. [x] May wash over incision in shower in 24 hours, but do not soak in a bath.  [] Take sitz bath for 20 minutes twice daily and after bowel movements. [] Keep the abdominal binder in place during the day. May remove to shower and at night.  [] Remove packing from wound in 24 hours and replace daily. [] Empty DESIREE drain daily and record the amounts. ABDOMINAL/LAPAROSCOPIC SURGERY  [x]You are encouraged to get up and move around as this helps with the circulation and speeds up the healing process. [x]Breath deeply and cough from time to time. This helps to clear your lungs and helps prevent pneumonia. [x]Supporting your incision with a pillow or your hand helps to minimize discomfort and pain. [x]Robotic/Laparoscopic patients may develop shoulder pain in the first 48 hours from the gas used during the procedure. FOLLOW-UP CARE. SPECIFICALLY WATCH FOR:   Fever over 101 degrees by mouth   Increased redness, warmth, hardness at operative site. Blood soaked dressing (small amounts of oozing may be normal.)   Increased or progressive drainage from the surgical area   Inability to urinate or blood in the urine   Pain not relieved by the medications ordered   Persistent nausea and/or vomiting, unable to retain fluids. Pain or swelling in your legs. Shortness of breath.     FOLLOW-UP APPOINTMENT   []1 week   [x]2 weeks   []Other    Call my office if you have any problem that concerns you [phone]. After hours, you can reach the answering service via the office phone number. IF YOU NEED IMMEDIATE ATTENTION, GO TO THE EMERGENCY ROOM AND YOUR DOCTOR WILL BE CONTACTED. Ranjeet García MD [facility]  [address] [address] [address]  [facility], [facility]  Electronically signed 2/23/2023 at 7:15 AM    Surgical Site Infections      How can we work together to prevent Surgical Site Infections? We would like to thank you for choosing [facility] for your Surgical Care. Below you will find helpful information on how we can work together to prevent Surgical Site Infections. What is a Surgical Site Infection (SSI)? A surgical site infection is an infection that occurs after surgery in the part of the body where the surgery took place. Most patients who have surgery do not develop an infection. However, infections develop in about 1 to 3 out of every 100 patients who have surgery. Some of the common symptoms of a surgical site infection are:  Redness and pain around the area where you had surgery  Drainage of cloudy fluid from your surgical wound  Fever    Can SSIs be treated? Yes. Most surgical site infections can be treated with antibiotics. The antibiotic given to you depends on the bacteria (germs) causing the infection. Sometimes patients with SSIs also need another surgery to treat the infection. What are some of the things that hospitals are doing to prevent SSIs? To prevent SSIs, doctors, nurses, and other healthcare providers: May remove some of your hair immediately before your surgery using electric clippers if the hair is in the same area where the procedure will occur. They should not shave you with a razor. Give you antibiotics before your surgery starts. In most cases, you should get antibiotics within 60 minutes before the surgery starts and the antibiotics should be stopped within 24 hours after surgery.   Clean the skin at the site of your surgery with a special soap that kills germs. Clean their hands and arms up to their elbows with an antiseptic agent just before the surgery. Wear special hair covers, masks, gowns, and gloves during surgery to  keep the surgery area clean. Clean their hands with soap and water or an alcohol-based hand rub before and after caring for each patient. If you do not see your providers clean their hands, please     ask  them to do so. What can I do to help prevent SSIs? Before your surgery:  Tell your doctor about other medical problems you may have. Health problems such as allergies, diabetes, and obesity could affect your surgery and your treatment. Quit smoking. Patients who smoke get more infections. Talk to your doctor about how you can quit before your surgery. Do not shave near where you will have surgery. Shaving with a razor can irritate your skin and make it easier to develop an infection. At the time of your surgery:  Speak up if someone tries to shave you with a razor before surgery. Ask why you need to be shaved and talk with your surgeon if you have any concerns. Ask if you will get antibiotics before surgery. After your surgery:  Make sure that your healthcare providers clean their hands before examining you, either with soap and water or an alcohol-based hand rub. Family and friends who visit you should not touch the surgical wound or dressings. Family and friends should clean their hands with soap and water or an alcohol-based hand rub before and after visiting you. If you do not see them clean their hands, ask them to clean their hands. What do I need to do when I go home from the hospital?  Before you go home, your doctor or nurse should explain everything you need to know about taking care of your wound. Make sure you understand how to care for your wound before you leave the hospital.  Always clean your hands before and after caring for your wound.   Before you go home, make sure you know who to contact if you have questions or problems after you get home. If you have any symptoms of an infection, such as redness and pain at the surgery site, drainage, or fever, call your doctor immediately. If you have additional questions, please ask your doctor or nurse.

## 2023-02-23 NOTE — PROGRESS NOTES
1073 pt arrived to PACU, awakens to voice and denies pain. VSS. Sites CDI x3  1003 pt awake in bed, denies pain at this time. VSS  1013 pt awake in bed, denies pain. VSS  1018 pt states pain 1/10 and tolerable.  VSS  1023 meets criteria for discharge from PACU, transported to [facility] in stable condition

## 2023-02-23 NOTE — INTERVAL H&P NOTE
Update History & Physical    The patient's History and Physical was reviewed with the patient and I examined the patient. There was no change. The surgical site was confirmed by the patient and me. Plan: The risks, benefits, expected outcome, and alternative to the recommended procedure have been discussed with the patient. Patient understands and wants to proceed with the procedure.      Electronically signed by Felicia David MD on 2/23/2023 at 7:15 AM

## 2023-02-23 NOTE — TELEPHONE ENCOUNTER
Spouse has been informed of script being sent to [facility] in [place].   I called [facility] and canceled old script at [facility].

## 2023-02-24 NOTE — OP NOTE
[address] [place] [address]                                OPERATIVE REPORT    PATIENT NAME: Senthil Michelle                    :        1950  MED REC NO:   [id number]                           ROOM:  ACCOUNT NO:   [de-identified]                           ADMIT DATE: 2023  PROVIDER:     ALISA Cabrera Bidding OF PROCEDURE:  2023    PREOPERATIVE DIAGNOSIS:  Right inguinal hernia. POSTOPERATIVE DIAGNOSIS:  Right inguinal hernia. OPERATION PERFORMED:  Robotic repair of large direct inguinal hernia  with mesh (large 3DMax mid weight mesh). SURGEON:  Delisa Schreiber MD    ASSISTANT:  Aleena Stockton. [facility]    ANESTHESIA:  General/local.    ESTIMATED BLOOD LOSS:  5 mL. DRAINS:  None. COMPLICATIONS:  None. DISPOSITION:  Stable to the recovery room. INDICATIONS:  The patient is a 80-year-old male who has been having an  enlarging right inguinal hernia over the past several months. Worsening  discomfort. Urinary changes. Both operative and nonoperative  intervention plans were discussed. Risks of surgery were further  discussed. Some of the risks included but were not limited to bleeding,  infection, the need for re-operation, severe chronic postoperative pain  or numbness, major vascular or nerve injury, cardiopulmonary  complications, anesthetic complications, seroma or hematoma formation,  wound breakdown, trocar site herniation, recurrence of the hernia, mesh  infection requiring the removal of the mesh, ischemic orchitis resulting  in loss of testicle, and death. After all of the questions were  answered in their entirety and the patient was completely aware of the  current situation, he wished to proceed with surgery.     DESCRIPTION OF PROCEDURE:  After informed consent was signed and placed  on the chart, the patient was taken back to the operating room and  placed supine on the operating room table.  General anesthesia was  induced. He tolerated this throughout the case. All pressure points  were padded. He was on preoperative antibiotics. Bilateral lower  extremity sequential compression devices were placed prior to incision. His abdomen, pelvis, and groin region were all prepped and draped in the  usual sterile standard fashion. A time-out occurred prior to the  operation which not only identified the patient but also the planned  procedure to be performed. At the end of the time-out, there were no  questions or concerns. I began the operation by making a small  transverse incision just above the umbilicus. Fascia elevated. The  Veress needle inserted. Intra-abdominal cavity was insufflated to a  pressure of approximately 15 mmHg with carbon dioxide gas. The patient  tolerated the insufflation well. An 8-mm trocar placed. Laparoscope  inserted. Upon initial evaluation, there was no hollow viscus, solid  organ, or major vascular injury with the Veress needle insertion or the  first trocar placement. Two other 8-mm trocars were placed in a  standard location under direct vision. He was then placed in slight  Trendelenburg. Robot brought in and docked. Instruments placed under  direct vision. Once everything was aligned and in order, I then  unscrubbed and went back to the console. I began the operation by  evaluating the groin structures. Left side overall looked good. Right  side had a large direct hernia. Preperitoneal space was entered into  after a transverse incision was made on the peritoneum there on the  right side with monopolar scissors. Preperitoneal space was then fully  dissected out to the standard landmarks. Hemostasis adequate. The  large direct hernia component was reduced entirely back into the  intra-abdominal cavity. Cord structures were then circumferentially  dissected free. No significant indirect hernia. No significant cord  lipoma.   Once everything was reduced and hemostasis appeared to be  adequate, a large 3DMax mid weight mesh was then brought in and secured. This was secured medially to the pubic tubercle x2 with 0 Ethibond  suture and then also three to the anterior abdominal wall. The suture  was placed around medially at the superior aspect given the large direct  hernia defect trying to make sure that the mesh did not move. During  suture placement as well as during dissection, care was taken to avoid  neurovascular bundles, triangle of pain, triangle of doom, and the cord  structures. 3-0 V-Loc was then used to reapproximate the peritoneal  flaps and during closure, incorporation of the hernia sac up to the  anterior abdominal wall occurred. Instruments removed, robot undocked. I scrubbed back into the case. The patient tolerated the desufflation  well. Hemostasis adequate. Skin reapproximated at all the incisional  sites with 4-0 Vicryl in subcuticular fashion. Closed incisions were  then cleaned and dried, and Steri-Strips applied. Dry sterile dressings  applied. Sponge, needle, and instrumentation count was correct at the  end of the procedure. The patient tolerated the procedure well with no  apparent complications and only about 5 mL of blood loss. He was able  to be brought out of general anesthesia and transferred to the  postanesthesia care unit in stable condition.         Darrius Lemus M.D.    D: 02/23/2023 10:04:09       T: 02/23/2023 10:07:55     [email or web address]  Job#: [id number]     Doc#: [id number]    CC:

## 2023-03-16 ENCOUNTER — OFFICE VISIT (OUTPATIENT)
Dept: SURGERY | Age: 73
End: 2023-03-16

## 2023-03-16 VITALS
SYSTOLIC BLOOD PRESSURE: 122 MMHG | WEIGHT: 187.5 LBS | HEIGHT: 69 IN | RESPIRATION RATE: 16 BRPM | TEMPERATURE: 98 F | OXYGEN SATURATION: 99 % | BODY MASS INDEX: 27.77 KG/M2 | DIASTOLIC BLOOD PRESSURE: 64 MMHG | HEART RATE: 80 BPM

## 2023-03-16 DIAGNOSIS — Z87.19 S/P RIGHT INGUINAL HERNIA REPAIR: Primary | ICD-10-CM

## 2023-03-16 DIAGNOSIS — Z98.890 S/P RIGHT INGUINAL HERNIA REPAIR: Primary | ICD-10-CM

## 2023-03-16 PROCEDURE — 99024 POSTOP FOLLOW-UP VISIT: CPT | Performed by: NURSE PRACTITIONER

## 2023-03-16 RX ORDER — SULFAMETHOXAZOLE AND TRIMETHOPRIM 800; 160 MG/1; MG/1
TABLET ORAL
COMMUNITY
Start: 2023-03-14

## 2023-03-16 NOTE — PROGRESS NOTES
[address] [place] [address] Dr [address]manus[address] [facility] [address]  Dept: [phone]  Dept Fax: [phone]  Loc: [phone]    Visit Date: 3/16/2023    Loraine Fitzgerald is a 67 y.o. male who presents today for:  Chief Complaint   Patient presents with    Post-Op Check     S/P Robotic repair of large direct inguinal hernia with mesh (large 3DMax mid weight mesh) 2/23/23. HPI:     LAVERNE Jane is a 70-year old male patient who presents today for follow up status post robotic repair of large right sided inguinal hernia with mesh 3 weeks ago. Presents with wife. Denies really any pain. Off all narcotics. Appetite back to normal. No nausea or vomiting. No fever, chills, or sweats. Incisions are healing well without signs of infection. Denies any scrotal swelling. No groin swelling or tenderness. Minimal right sided induration. No issues urinating. Bowels are back to normal. No stool softener use. No SOB or chest pain. No lightheadedness or dizziness.       Past Medical History:   Diagnosis Date    Arthritis     Cancer [facility]) 2017    colon dr Cherie Diaz ; [place]    Chronic sinusitis     Hypertension     Ag    Vitamin D deficiency       Past Surgical History:   Procedure Laterality Date    ARM SURGERY Right 04/14/2021    EXCISION OF MELANOMA RIGHT UPPER ARM WITH SENTNEL LYMPH NODE BIOPSY RIGHT AXILLA performed by Emily Albert MD at [address] [place] [address]  2021    [facility]    DILATATION, ESOPHAGUS      HERNIA REPAIR Right 2/23/2023    Robotic Right Inguinal Hernia Repair with Mesh performed by Pepe Smith MD at [address] [place] [address] DrLing Left     30 years ago    KNEE SURGERY Left     OTHER SURGICAL HISTORY  04/04/2017    robotic ileocecectomy-Dr POWER [facility]    SKIN CANCER EXCISION  03/2021    melanoma removal Dr Prashanth Pavon- also removed lymph nodes     TURP N/A 2/3/2023    CYSTO, GREENLIGHT PHOTO VAPORIZATION OF PROSTATE performed by Sparkle Anguiano MD at [place] [address] History   Problem Relation Age of Onset    Heart Disease Father     Cancer Father        Social History     Tobacco Use    Smoking status: Former     Packs/day: 1.00     Years: 25.00     Pack years: 25.00     Types: Cigarettes     Start date: 1968     Quit date:      Years since quittin.2    Smokeless tobacco: Never   Substance Use Topics    Alcohol use: Yes     Alcohol/week: 2.0 standard drinks     Types: 2 Standard drinks or equivalent per week     Comment: occasional      Current Outpatient Medications   Medication Sig Dispense Refill    sulfamethoxazole-trimethoprim (BACTRIM DS;SEPTRA DS) 800-160 MG per tablet       silodosin (RAPAFLO) 8 MG CAPS Take 1 capsule by mouth every evening 90 capsule 1    Magnesium 400 MG TABS Take 1 tablet by mouth daily      Cholecalciferol (D3 ADULT PO) Take 400 mg by mouth in the morning and at bedtime      amLODIPine (NORVASC) 10 MG tablet Take 10 mg by mouth daily      medical marijuana Take by mouth as needed. Gummys      finasteride (PROSCAR) 5 MG tablet Take 1 tablet by mouth daily 90 tablet 3     No current facility-administered medications for this visit. Allergies   Allergen Reactions    Penicillins      Unsure of reaction HAPPENED WHEN A WAS A TEENAGER OR EARLY TWENTY'S       Subjective:     Review of Systems   Constitutional:  Positive for activity change. Negative for appetite change, chills, diaphoresis, fatigue, fever and unexpected weight change. HENT:  Negative for congestion, dental problem, hearing loss, rhinorrhea, sinus pressure and sore throat. Eyes:  Negative for photophobia, pain, discharge, itching and visual disturbance. Respiratory:  Negative for apnea, cough, choking, chest tightness, shortness of breath and wheezing. Cardiovascular:  Negative for chest pain, palpitations and leg swelling.    Gastrointestinal:  Negative for abdominal distention, abdominal pain, anal bleeding, blood in stool, constipation, diarrhea, nausea and vomiting. Endocrine: Negative. Genitourinary:  Negative for decreased urine volume, difficulty urinating, dysuria, frequency and urgency. Musculoskeletal:  Negative for arthralgias, back pain, gait problem, joint swelling, myalgias and neck pain. Skin:  Negative for color change, pallor, rash and wound. Allergic/Immunologic: Negative. Neurological:  Negative for dizziness, tremors, weakness, numbness and headaches. Hematological: Negative. Psychiatric/Behavioral: Negative. Objective:   /64   Pulse 80   Temp 98 °F (36.7 °C)   Resp 16   Ht 5' 9\" (1.753 m)   Wt 187 lb 8 oz (85 kg)   SpO2 99%   BMI 27.69 kg/m²   Wt Readings from Last 3 Encounters:   03/16/23 187 lb 8 oz (85 kg)   02/23/23 177 lb (80.3 kg)   02/16/23 183 lb 8 oz (83.2 kg)         Physical Exam  Vitals reviewed. Constitutional:       General: He is not in acute distress. Appearance: Normal appearance. He is well-developed. He is not ill-appearing or toxic-appearing. HENT:      Head: Normocephalic and atraumatic. Right Ear: Hearing and external ear normal.      Left Ear: Hearing and external ear normal.      Nose: Nose normal.      Mouth/Throat:      Mouth: Mucous membranes are not pale, not dry and not cyanotic. Eyes:      General: Lids are normal.   Neck:      Trachea: Trachea and phonation normal.   Cardiovascular:      Rate and Rhythm: Normal rate and regular rhythm. Pulses: Normal pulses. Heart sounds: S1 normal and S2 normal.   Pulmonary:      Effort: Pulmonary effort is normal. No tachypnea, bradypnea, accessory muscle usage or respiratory distress. Breath sounds: Normal breath sounds. No decreased breath sounds, wheezing or rales. Chest:      Chest wall: No tenderness. Abdominal:      General: Bowel sounds are normal. There is no distension. Palpations: Abdomen is soft. There is no mass. Tenderness:  There is no abdominal tenderness. Musculoskeletal:         General: No tenderness. Normal range of motion. Cervical back: Normal range of motion and neck supple. Skin:     General: Skin is warm and dry. Findings: No abrasion, bruising, burn, ecchymosis, erythema, laceration, lesion or rash. Neurological:      Mental Status: He is alert and oriented to person, place, and time. Motor: No tremor, atrophy or abnormal muscle tone. Coordination: Coordination normal.      Gait: Gait normal.      Deep Tendon Reflexes: Reflexes are normal and symmetric. Psychiatric:         Speech: Speech normal.         Behavior: Behavior normal.         Thought Content: Thought content normal.      Patient Active Problem List   Diagnosis    Colonic mass    Excessive sweating    Weight gain    Sneeze    Vision abnormalities    Palpitation    Heat intolerance    Aches    Tinea pedis of both feet    Lightheaded    Numbness and tingling in both hands    Bruising    Sleep difficulties    Memory difficulties    Anxiety    Elevated lipids    Blood glucose elevated    Abnormal EKG    Essential hypertension    SOB (shortness of breath) on exertion    Dizziness on standing    History of syncope    Right inguinal hernia       Assessment:     Status post robotic repair of right inguinal hernia with mesh    Plan:     Abdomen benign. Incisions are healing well without signs of infection. Continue wound care as directed. No bulge or instability noted at old hernia site  Appetite and bowel function returned to normal  Off narcotics. Tylenol and/or Motrin as needed for discomfort. Lifting/activity restrictions discussed with patient. Questions answered. Follow up as needed. Signs and symptoms reviewed with patient that would be concerning and need him to return to office for re-evaluation. Patient states he will call if he has questions or concerns.     Electronically signed by CICI Branch CNP on 3/19/2023 at 5:11 PM

## 2023-03-16 NOTE — PATIENT INSTRUCTIONS
Continue wound care. Monitor for redness, swelling, or purulent drainage. No lotions, ointments, alcohol or peroxide to incision sites. Maintain lifting restrictions for the full 6 weeks after surgery (April 6th, 2023). No heavy lifting, pulling, or tugging greater than 20-25 lbs. Gradually ease into normal routine lifting heavier objects. Continue stool softeners as needed. Take Colace or Miralax if needed. Follow up as directed. Do not hesitate to call with any questions or concerns.

## 2023-03-19 ASSESSMENT — ENCOUNTER SYMPTOMS
COLOR CHANGE: 0
BLOOD IN STOOL: 0
SHORTNESS OF BREATH: 0
PHOTOPHOBIA: 0
SINUS PRESSURE: 0
ANAL BLEEDING: 0
APNEA: 0
DIARRHEA: 0
COUGH: 0
SORE THROAT: 0
BACK PAIN: 0
RHINORRHEA: 0
VOMITING: 0
ABDOMINAL PAIN: 0
CHEST TIGHTNESS: 0
EYE DISCHARGE: 0
ABDOMINAL DISTENTION: 0
NAUSEA: 0
EYE ITCHING: 0
WHEEZING: 0
CONSTIPATION: 0
EYE PAIN: 0
ALLERGIC/IMMUNOLOGIC NEGATIVE: 1
CHOKING: 0

## 2023-03-29 ENCOUNTER — NURSE ONLY (OUTPATIENT)
Dept: LAB | Age: 73
End: 2023-03-29

## 2023-03-29 DIAGNOSIS — N40.1 BENIGN LOCALIZED PROSTATIC HYPERPLASIA WITH LOWER URINARY TRACT SYMPTOMS (LUTS): ICD-10-CM

## 2023-03-29 LAB — PSA SERPL-MCNC: 0.38 NG/ML (ref 0–1)

## 2023-06-21 ENCOUNTER — HOSPITAL ENCOUNTER (OUTPATIENT)
Age: 73
Discharge: HOME | End: 2023-06-21
Payer: MEDICARE

## 2023-06-21 ENCOUNTER — HOSPITAL ENCOUNTER (OUTPATIENT)
Dept: GENERAL RADIOLOGY | Age: 73
Discharge: HOME | End: 2023-06-21
Payer: MEDICARE

## 2023-06-21 DIAGNOSIS — Z01.818 PREOP TESTING: ICD-10-CM

## 2023-06-21 LAB
25(OH)D3 SERPL-MCNC: 78 NG/ML (ref 30–100)
ALBUMIN SERPL BCG-MCNC: 4.4 G/DL (ref 3.5–5.1)
ALP SERPL-CCNC: 80 U/L (ref 38–126)
ALT SERPL W/O P-5'-P-CCNC: 12 U/L (ref 11–66)
ANION GAP SERPL CALC-SCNC: 10 MEQ/L (ref 8–16)
AST SERPL-CCNC: 18 U/L (ref 5–40)
BILIRUB CONJ SERPL-MCNC: < 0.2 MG/DL (ref 0–0.3)
BILIRUB SERPL-MCNC: 0.5 MG/DL (ref 0.3–1.2)
BUN SERPL-MCNC: 17 MG/DL (ref 7–22)
CALCIUM SERPL-MCNC: 10 MG/DL (ref 8.5–10.5)
CHLORIDE SERPL-SCNC: 100 MEQ/L (ref 98–111)
CO2 SERPL-SCNC: 28 MEQ/L (ref 23–33)
CREAT SERPL-MCNC: 0.8 MG/DL (ref 0.4–1.2)
CRP SERPL-MCNC: < 0.3 MG/DL (ref 0–1)
DEPRECATED RDW RBC AUTO: 44.6 FL (ref 35–45)
ERYTHROCYTE [DISTWIDTH] IN BLOOD BY AUTOMATED COUNT: 13 % (ref 11.5–14.5)
GFR SERPL CREATININE-BSD FRML MDRD: > 60 ML/MIN/1.73M2
GLUCOSE SERPL-MCNC: 95 MG/DL (ref 70–108)
HCT VFR BLD AUTO: 43.5 % (ref 42–52)
HGB BLD-MCNC: 14.1 GM/DL (ref 14–18)
MCH RBC QN AUTO: 30.5 PG (ref 26–33)
MCHC RBC AUTO-ENTMCNC: 32.4 GM/DL (ref 32.2–35.5)
MCV RBC AUTO: 94 FL (ref 80–94)
PLATELET # BLD AUTO: 334 THOU/MM3 (ref 130–400)
PMV BLD AUTO: 10.6 FL (ref 9.4–12.4)
POTASSIUM SERPL-SCNC: 4.8 MEQ/L (ref 3.5–5.2)
PROT SERPL-MCNC: 6.9 G/DL (ref 6.1–8)
RBC # BLD AUTO: 4.63 MILL/MM3 (ref 4.7–6.1)
SODIUM SERPL-SCNC: 138 MEQ/L (ref 135–145)
WBC # BLD AUTO: 5 THOU/MM3 (ref 4.8–10.8)

## 2023-06-21 PROCEDURE — 80053 COMPREHEN METABOLIC PANEL: CPT

## 2023-06-21 PROCEDURE — 85027 COMPLETE CBC AUTOMATED: CPT

## 2023-06-21 PROCEDURE — 71046 X-RAY EXAM CHEST 2 VIEWS: CPT

## 2023-06-21 PROCEDURE — 82248 BILIRUBIN DIRECT: CPT

## 2023-06-21 PROCEDURE — 82306 VITAMIN D 25 HYDROXY: CPT

## 2023-06-21 PROCEDURE — 36415 COLL VENOUS BLD VENIPUNCTURE: CPT

## 2023-06-21 PROCEDURE — 86140 C-REACTIVE PROTEIN: CPT

## 2023-06-26 ENCOUNTER — APPOINTMENT (OUTPATIENT)
Dept: CT IMAGING | Age: 73
End: 2023-06-26
Payer: MEDICARE

## 2023-06-26 ENCOUNTER — HOSPITAL ENCOUNTER (OUTPATIENT)
Age: 73
Setting detail: OBSERVATION
Discharge: HOME | End: 2023-06-27
Attending: STUDENT IN AN ORGANIZED HEALTH CARE EDUCATION/TRAINING PROGRAM | Admitting: STUDENT IN AN ORGANIZED HEALTH CARE EDUCATION/TRAINING PROGRAM
Payer: MEDICARE

## 2023-06-26 DIAGNOSIS — Z87.898 HISTORY OF SYNCOPE: ICD-10-CM

## 2023-06-26 DIAGNOSIS — E87.1 HYPONATREMIA: ICD-10-CM

## 2023-06-26 DIAGNOSIS — R42 DIZZINESS ON STANDING: ICD-10-CM

## 2023-06-26 DIAGNOSIS — R94.31 ABNORMAL EKG: ICD-10-CM

## 2023-06-26 DIAGNOSIS — R42 DIZZINESS: Primary | ICD-10-CM

## 2023-06-26 LAB
ANION GAP SERPL CALC-SCNC: 14 MEQ/L (ref 8–16)
BASOPHILS ABSOLUTE: 0.1 THOU/MM3 (ref 0–0.1)
BASOPHILS NFR BLD AUTO: 1.1 %
BUN SERPL-MCNC: 13 MG/DL (ref 7–22)
CALCIUM SERPL-MCNC: 9.1 MG/DL (ref 8.5–10.5)
CHLORIDE SERPL-SCNC: 91 MEQ/L (ref 98–111)
CO2 SERPL-SCNC: 24 MEQ/L (ref 23–33)
CREAT SERPL-MCNC: 0.7 MG/DL (ref 0.4–1.2)
DEPRECATED RDW RBC AUTO: 42.9 FL (ref 35–45)
EOSINOPHIL NFR BLD AUTO: 3.8 %
EOSINOPHILS ABSOLUTE: 0.2 THOU/MM3 (ref 0–0.4)
ERYTHROCYTE [DISTWIDTH] IN BLOOD BY AUTOMATED COUNT: 12.8 % (ref 11.5–14.5)
GFR SERPL CREATININE-BSD FRML MDRD: > 60 ML/MIN/1.73M2
GLUCOSE SERPL-MCNC: 106 MG/DL (ref 70–108)
HCT VFR BLD AUTO: 40 % (ref 42–52)
HGB BLD-MCNC: 13.3 GM/DL (ref 14–18)
HGB RETIC QN AUTO: 35.6 PG (ref 28.2–35.7)
IMM GRANULOCYTES # BLD AUTO: 0.01 THOU/MM3 (ref 0–0.07)
IMM GRANULOCYTES NFR BLD AUTO: 0.2 %
IMM RETICS NFR: 7.2 % (ref 2.3–13.4)
LYMPHOCYTES ABSOLUTE: 1.6 THOU/MM3 (ref 1–4.8)
LYMPHOCYTES NFR BLD AUTO: 28.2 %
MAGNESIUM SERPL-MCNC: 2.2 MG/DL (ref 1.6–2.4)
MCH RBC QN AUTO: 30.5 PG (ref 26–33)
MCHC RBC AUTO-ENTMCNC: 33.3 GM/DL (ref 32.2–35.5)
MCV RBC AUTO: 91.7 FL (ref 80–94)
MONOCYTES ABSOLUTE: 0.6 THOU/MM3 (ref 0.4–1.3)
MONOCYTES NFR BLD AUTO: 10.4 %
NEUTROPHILS NFR BLD AUTO: 56.3 %
NRBC BLD AUTO-RTO: 0 /100 WBC
OSMOLALITY SERPL CALC.SUM OF ELEC: 259.5 MOSMOL/KG (ref 275–300)
OSMOLALITY UR: 190 MOSMOL/KG (ref 250–750)
PLATELET # BLD AUTO: 299 THOU/MM3 (ref 130–400)
PMV BLD AUTO: 10.3 FL (ref 9.4–12.4)
POTASSIUM SERPL-SCNC: 3.3 MEQ/L (ref 3.5–5.2)
RBC # BLD AUTO: 4.36 MILL/MM3 (ref 4.7–6.1)
RETICS # AUTO: 44 THOU/MM3 (ref 20–115)
RETICS/RBC NFR AUTO: 0.9 % (ref 0.5–2)
SEGMENTED NEUTROPHILS ABSOLUTE COUNT: 3.2 THOU/MM3 (ref 1.8–7.7)
SODIUM SERPL-SCNC: 129 MEQ/L (ref 135–145)
SODIUM UR-SCNC: 48 MEQ/L
TROPONIN T: < 0.01 NG/ML
WBC # BLD AUTO: 5.6 THOU/MM3 (ref 4.8–10.8)

## 2023-06-26 PROCEDURE — 80048 BASIC METABOLIC PNL TOTAL CA: CPT

## 2023-06-26 PROCEDURE — 96360 HYDRATION IV INFUSION INIT: CPT

## 2023-06-26 PROCEDURE — 86900 BLOOD TYPING SEROLOGIC ABO: CPT

## 2023-06-26 PROCEDURE — 82607 VITAMIN B-12: CPT

## 2023-06-26 PROCEDURE — 82247 BILIRUBIN TOTAL: CPT

## 2023-06-26 PROCEDURE — 70450 CT HEAD/BRAIN W/O DYE: CPT

## 2023-06-26 PROCEDURE — 96361 HYDRATE IV INFUSION ADD-ON: CPT

## 2023-06-26 PROCEDURE — 36415 COLL VENOUS BLD VENIPUNCTURE: CPT

## 2023-06-26 PROCEDURE — G0378 HOSPITAL OBSERVATION PER HR: HCPCS

## 2023-06-26 PROCEDURE — 2580000003 HC RX 258: Performed by: NURSE PRACTITIONER

## 2023-06-26 PROCEDURE — 99222 1ST HOSP IP/OBS MODERATE 55: CPT | Performed by: STUDENT IN AN ORGANIZED HEALTH CARE EDUCATION/TRAINING PROGRAM

## 2023-06-26 PROCEDURE — 82746 ASSAY OF FOLIC ACID SERUM: CPT

## 2023-06-26 PROCEDURE — 86901 BLOOD TYPING SEROLOGIC RH(D): CPT

## 2023-06-26 PROCEDURE — 82306 VITAMIN D 25 HYDROXY: CPT

## 2023-06-26 PROCEDURE — 99285 EMERGENCY DEPT VISIT HI MDM: CPT

## 2023-06-26 PROCEDURE — 6370000000 HC RX 637 (ALT 250 FOR IP): Performed by: NURSE PRACTITIONER

## 2023-06-26 PROCEDURE — 85025 COMPLETE CBC W/AUTO DIFF WBC: CPT

## 2023-06-26 PROCEDURE — 93005 ELECTROCARDIOGRAM TRACING: CPT | Performed by: STUDENT IN AN ORGANIZED HEALTH CARE EDUCATION/TRAINING PROGRAM

## 2023-06-26 PROCEDURE — 83540 ASSAY OF IRON: CPT

## 2023-06-26 PROCEDURE — 84300 ASSAY OF URINE SODIUM: CPT

## 2023-06-26 PROCEDURE — 83550 IRON BINDING TEST: CPT

## 2023-06-26 PROCEDURE — 83935 ASSAY OF URINE OSMOLALITY: CPT

## 2023-06-26 PROCEDURE — 83735 ASSAY OF MAGNESIUM: CPT

## 2023-06-26 PROCEDURE — 84540 ASSAY OF URINE/UREA-N: CPT

## 2023-06-26 PROCEDURE — 85046 RETICYTE/HGB CONCENTRATE: CPT

## 2023-06-26 PROCEDURE — 82728 ASSAY OF FERRITIN: CPT

## 2023-06-26 PROCEDURE — 84133 ASSAY OF URINE POTASSIUM: CPT

## 2023-06-26 PROCEDURE — 86850 RBC ANTIBODY SCREEN: CPT

## 2023-06-26 PROCEDURE — 84484 ASSAY OF TROPONIN QUANT: CPT

## 2023-06-26 RX ORDER — ACETAMINOPHEN 650 MG/1
650 SUPPOSITORY RECTAL EVERY 6 HOURS PRN
Status: DISCONTINUED | OUTPATIENT
Start: 2023-06-26 | End: 2023-06-27 | Stop reason: HOSPADM

## 2023-06-26 RX ORDER — MAGNESIUM SULFATE IN WATER 40 MG/ML
2000 INJECTION, SOLUTION INTRAVENOUS PRN
Status: DISCONTINUED | OUTPATIENT
Start: 2023-06-26 | End: 2023-06-27 | Stop reason: HOSPADM

## 2023-06-26 RX ORDER — SODIUM CHLORIDE 9 MG/ML
INJECTION, SOLUTION INTRAVENOUS CONTINUOUS
Status: DISCONTINUED | OUTPATIENT
Start: 2023-06-26 | End: 2023-06-26

## 2023-06-26 RX ORDER — POTASSIUM CHLORIDE 7.45 MG/ML
10 INJECTION INTRAVENOUS PRN
Status: DISCONTINUED | OUTPATIENT
Start: 2023-06-26 | End: 2023-06-27 | Stop reason: HOSPADM

## 2023-06-26 RX ORDER — SODIUM CHLORIDE 9 MG/ML
INJECTION, SOLUTION INTRAVENOUS PRN
Status: DISCONTINUED | OUTPATIENT
Start: 2023-06-26 | End: 2023-06-27 | Stop reason: HOSPADM

## 2023-06-26 RX ORDER — SODIUM CHLORIDE 0.9 % (FLUSH) 0.9 %
5-40 SYRINGE (ML) INJECTION EVERY 12 HOURS SCHEDULED
Status: DISCONTINUED | OUTPATIENT
Start: 2023-06-26 | End: 2023-06-27 | Stop reason: HOSPADM

## 2023-06-26 RX ORDER — SODIUM CHLORIDE 0.9 % (FLUSH) 0.9 %
5-40 SYRINGE (ML) INJECTION PRN
Status: DISCONTINUED | OUTPATIENT
Start: 2023-06-26 | End: 2023-06-27 | Stop reason: HOSPADM

## 2023-06-26 RX ORDER — MAGNESIUM HYDROXIDE/ALUMINUM HYDROXICE/SIMETHICONE 120; 1200; 1200 MG/30ML; MG/30ML; MG/30ML
30 SUSPENSION ORAL EVERY 6 HOURS PRN
Status: DISCONTINUED | OUTPATIENT
Start: 2023-06-26 | End: 2023-06-27 | Stop reason: HOSPADM

## 2023-06-26 RX ORDER — ONDANSETRON 2 MG/ML
4 INJECTION INTRAMUSCULAR; INTRAVENOUS EVERY 6 HOURS PRN
Status: DISCONTINUED | OUTPATIENT
Start: 2023-06-26 | End: 2023-06-27 | Stop reason: HOSPADM

## 2023-06-26 RX ORDER — ENOXAPARIN SODIUM 100 MG/ML
40 INJECTION SUBCUTANEOUS DAILY
Status: DISCONTINUED | OUTPATIENT
Start: 2023-06-27 | End: 2023-06-27 | Stop reason: HOSPADM

## 2023-06-26 RX ORDER — POLYETHYLENE GLYCOL 3350 17 G/17G
17 POWDER, FOR SOLUTION ORAL DAILY PRN
Status: DISCONTINUED | OUTPATIENT
Start: 2023-06-26 | End: 2023-06-27 | Stop reason: HOSPADM

## 2023-06-26 RX ORDER — ONDANSETRON 4 MG/1
4 TABLET, ORALLY DISINTEGRATING ORAL EVERY 8 HOURS PRN
Status: DISCONTINUED | OUTPATIENT
Start: 2023-06-26 | End: 2023-06-27 | Stop reason: HOSPADM

## 2023-06-26 RX ORDER — POTASSIUM CHLORIDE 750 MG/1
40 TABLET, FILM COATED, EXTENDED RELEASE ORAL ONCE
Status: COMPLETED | OUTPATIENT
Start: 2023-06-26 | End: 2023-06-26

## 2023-06-26 RX ORDER — ACETAMINOPHEN 325 MG/1
650 TABLET ORAL EVERY 6 HOURS PRN
Status: DISCONTINUED | OUTPATIENT
Start: 2023-06-26 | End: 2023-06-27 | Stop reason: HOSPADM

## 2023-06-26 RX ADMIN — POTASSIUM CHLORIDE 40 MEQ: 750 TABLET, EXTENDED RELEASE ORAL at 20:44

## 2023-06-26 RX ADMIN — SODIUM CHLORIDE: 9 INJECTION, SOLUTION INTRAVENOUS at 20:46

## 2023-06-26 ASSESSMENT — PAIN SCALES - GENERAL: PAINLEVEL_OUTOF10: 0

## 2023-06-26 ASSESSMENT — PAIN - FUNCTIONAL ASSESSMENT: PAIN_FUNCTIONAL_ASSESSMENT: NONE - DENIES PAIN

## 2023-06-27 ENCOUNTER — APPOINTMENT (OUTPATIENT)
Dept: INTERVENTIONAL RADIOLOGY/VASCULAR | Age: 73
End: 2023-06-27
Payer: MEDICARE

## 2023-06-27 VITALS
DIASTOLIC BLOOD PRESSURE: 79 MMHG | HEIGHT: 69 IN | TEMPERATURE: 97.7 F | RESPIRATION RATE: 16 BRPM | BODY MASS INDEX: 26.51 KG/M2 | OXYGEN SATURATION: 97 % | SYSTOLIC BLOOD PRESSURE: 130 MMHG | WEIGHT: 179.01 LBS | HEART RATE: 84 BPM

## 2023-06-27 LAB
25(OH)D3 SERPL-MCNC: 80 NG/ML (ref 30–100)
ABO: NORMAL
ANION GAP SERPL CALC-SCNC: 11 MEQ/L (ref 8–16)
ANTIBODY SCREEN: NORMAL
BASOPHILS ABSOLUTE: 0.1 THOU/MM3 (ref 0–0.1)
BASOPHILS NFR BLD AUTO: 1.1 %
BILIRUB SERPL-MCNC: 0.6 MG/DL (ref 0.3–1.2)
BUN SERPL-MCNC: 9 MG/DL (ref 7–22)
CALCIUM SERPL-MCNC: 9.6 MG/DL (ref 8.5–10.5)
CHLORIDE SERPL-SCNC: 100 MEQ/L (ref 98–111)
CO2 SERPL-SCNC: 26 MEQ/L (ref 23–33)
CORTIS SERPL-MCNC: 10.65 UG/DL
CORTISOL COLLECTION INFO: NORMAL
CREAT SERPL-MCNC: 0.8 MG/DL (ref 0.4–1.2)
DEPRECATED RDW RBC AUTO: 43.9 FL (ref 35–45)
EOSINOPHIL NFR BLD AUTO: 3.7 %
EOSINOPHILS ABSOLUTE: 0.2 THOU/MM3 (ref 0–0.4)
ERYTHROCYTE [DISTWIDTH] IN BLOOD BY AUTOMATED COUNT: 12.9 % (ref 11.5–14.5)
FERRITIN SERPL IA-MCNC: 126 NG/ML (ref 22–322)
FOLATE SERPL-MCNC: 7.2 NG/ML (ref 4.8–24.2)
GFR SERPL CREATININE-BSD FRML MDRD: > 60 ML/MIN/1.73M2
GLUCOSE SERPL-MCNC: 99 MG/DL (ref 70–108)
HCT VFR BLD AUTO: 44.5 % (ref 42–52)
HGB BLD-MCNC: 14.4 GM/DL (ref 14–18)
IMM GRANULOCYTES # BLD AUTO: 0.01 THOU/MM3 (ref 0–0.07)
IMM GRANULOCYTES NFR BLD AUTO: 0.2 %
IRON SATN MFR SERPL: 30 % (ref 20–50)
IRON SERPL-MCNC: 80 UG/DL (ref 65–195)
LYMPHOCYTES ABSOLUTE: 1.4 THOU/MM3 (ref 1–4.8)
LYMPHOCYTES NFR BLD AUTO: 25.8 %
MCH RBC QN AUTO: 30 PG (ref 26–33)
MCHC RBC AUTO-ENTMCNC: 32.4 GM/DL (ref 32.2–35.5)
MCV RBC AUTO: 92.7 FL (ref 80–94)
MONOCYTES ABSOLUTE: 0.7 THOU/MM3 (ref 0.4–1.3)
MONOCYTES NFR BLD AUTO: 12.8 %
NEUTROPHILS NFR BLD AUTO: 56.4 %
NRBC BLD AUTO-RTO: 0 /100 WBC
OSMOLALITY SERPL CALC.SUM OF ELEC: 272.5 MOSMOL/KG (ref 275–300)
PLATELET # BLD AUTO: 314 THOU/MM3 (ref 130–400)
PMV BLD AUTO: 10.1 FL (ref 9.4–12.4)
POTASSIUM SERPL-SCNC: 4 MEQ/L (ref 3.5–5.2)
POTASSIUM UR-SCNC: 20.7 MEQ/L
RBC # BLD AUTO: 4.8 MILL/MM3 (ref 4.7–6.1)
RH FACTOR: NORMAL
SEGMENTED NEUTROPHILS ABSOLUTE COUNT: 3.1 THOU/MM3 (ref 1.8–7.7)
SODIUM SERPL-SCNC: 137 MEQ/L (ref 135–145)
TIBC SERPL-MCNC: 271 UG/DL (ref 171–450)
TROPONIN T: < 0.01 NG/ML
TROPONIN T: < 0.01 NG/ML
TSH SERPL DL<=0.005 MIU/L-ACNC: 3.43 UIU/ML (ref 0.4–4.2)
UUN 24H UR-MCNC: 146 MG/DL
VIT B12 SERPL-MCNC: 209 PG/ML (ref 211–911)
WBC # BLD AUTO: 5.5 THOU/MM3 (ref 4.8–10.8)

## 2023-06-27 PROCEDURE — 99239 HOSP IP/OBS DSCHRG MGMT >30: CPT | Performed by: STUDENT IN AN ORGANIZED HEALTH CARE EDUCATION/TRAINING PROGRAM

## 2023-06-27 PROCEDURE — 82533 TOTAL CORTISOL: CPT

## 2023-06-27 PROCEDURE — 36415 COLL VENOUS BLD VENIPUNCTURE: CPT

## 2023-06-27 PROCEDURE — 93010 ELECTROCARDIOGRAM REPORT: CPT | Performed by: INTERNAL MEDICINE

## 2023-06-27 PROCEDURE — 80048 BASIC METABOLIC PNL TOTAL CA: CPT

## 2023-06-27 PROCEDURE — G0378 HOSPITAL OBSERVATION PER HR: HCPCS

## 2023-06-27 PROCEDURE — 84443 ASSAY THYROID STIM HORMONE: CPT

## 2023-06-27 PROCEDURE — 6370000000 HC RX 637 (ALT 250 FOR IP): Performed by: STUDENT IN AN ORGANIZED HEALTH CARE EDUCATION/TRAINING PROGRAM

## 2023-06-27 PROCEDURE — 84484 ASSAY OF TROPONIN QUANT: CPT

## 2023-06-27 PROCEDURE — 93270 REMOTE 30 DAY ECG REV/REPORT: CPT

## 2023-06-27 PROCEDURE — 93880 EXTRACRANIAL BILAT STUDY: CPT

## 2023-06-27 PROCEDURE — 2580000003 HC RX 258: Performed by: STUDENT IN AN ORGANIZED HEALTH CARE EDUCATION/TRAINING PROGRAM

## 2023-06-27 PROCEDURE — 93005 ELECTROCARDIOGRAM TRACING: CPT

## 2023-06-27 PROCEDURE — 85025 COMPLETE CBC W/AUTO DIFF WBC: CPT

## 2023-06-27 RX ORDER — TADALAFIL 5 MG/1
5 TABLET ORAL DAILY
Status: DISCONTINUED | OUTPATIENT
Start: 2023-06-27 | End: 2023-06-27

## 2023-06-27 RX ORDER — AMLODIPINE BESYLATE 10 MG/1
10 TABLET ORAL DAILY
Status: DISCONTINUED | OUTPATIENT
Start: 2023-06-27 | End: 2023-06-27 | Stop reason: HOSPADM

## 2023-06-27 RX ORDER — FINASTERIDE 5 MG/1
5 TABLET, FILM COATED ORAL DAILY
Status: DISCONTINUED | OUTPATIENT
Start: 2023-06-27 | End: 2023-06-27 | Stop reason: HOSPADM

## 2023-06-27 RX ADMIN — AMLODIPINE BESYLATE 10 MG: 10 TABLET ORAL at 09:46

## 2023-06-27 RX ADMIN — SODIUM CHLORIDE, PRESERVATIVE FREE 10 ML: 5 INJECTION INTRAVENOUS at 09:47

## 2023-06-30 ENCOUNTER — NURSE ONLY (OUTPATIENT)
Dept: LAB | Age: 73
End: 2023-06-30

## 2023-06-30 DIAGNOSIS — Z87.898 HISTORY OF SYNCOPE: ICD-10-CM

## 2023-06-30 LAB
ANION GAP SERPL CALC-SCNC: 12 MEQ/L (ref 8–16)
BUN SERPL-MCNC: 13 MG/DL (ref 7–22)
CALCIUM SERPL-MCNC: 9.4 MG/DL (ref 8.5–10.5)
CHLORIDE SERPL-SCNC: 100 MEQ/L (ref 98–111)
CO2 SERPL-SCNC: 26 MEQ/L (ref 23–33)
CREAT SERPL-MCNC: 0.7 MG/DL (ref 0.4–1.2)
GFR SERPL CREATININE-BSD FRML MDRD: > 60 ML/MIN/1.73M2
GLUCOSE SERPL-MCNC: 81 MG/DL (ref 70–108)
POTASSIUM SERPL-SCNC: 4.5 MEQ/L (ref 3.5–5.2)
SODIUM SERPL-SCNC: 138 MEQ/L (ref 135–145)

## 2023-07-01 LAB
EKG ATRIAL RATE: 73 BPM
EKG ATRIAL RATE: 90 BPM
EKG P AXIS: 36 DEGREES
EKG P AXIS: 48 DEGREES
EKG P-R INTERVAL: 252 MS
EKG P-R INTERVAL: 272 MS
EKG Q-T INTERVAL: 368 MS
EKG Q-T INTERVAL: 376 MS
EKG QRS DURATION: 104 MS
EKG QRS DURATION: 98 MS
EKG QTC CALCULATION (BAZETT): 414 MS
EKG QTC CALCULATION (BAZETT): 450 MS
EKG R AXIS: -32 DEGREES
EKG R AXIS: -33 DEGREES
EKG T AXIS: 26 DEGREES
EKG T AXIS: 4 DEGREES
EKG VENTRICULAR RATE: 73 BPM
EKG VENTRICULAR RATE: 90 BPM

## 2023-07-13 ENCOUNTER — OFFICE VISIT (OUTPATIENT)
Dept: CARDIOLOGY CLINIC | Age: 73
End: 2023-07-13
Payer: MEDICARE

## 2023-07-13 VITALS
SYSTOLIC BLOOD PRESSURE: 154 MMHG | HEIGHT: 69 IN | WEIGHT: 180.4 LBS | DIASTOLIC BLOOD PRESSURE: 92 MMHG | BODY MASS INDEX: 26.72 KG/M2 | HEART RATE: 83 BPM

## 2023-07-13 DIAGNOSIS — R94.31 ABNORMAL EKG: ICD-10-CM

## 2023-07-13 DIAGNOSIS — R42 DIZZINESS: Primary | ICD-10-CM

## 2023-07-13 DIAGNOSIS — I10 ESSENTIAL HYPERTENSION: ICD-10-CM

## 2023-07-13 DIAGNOSIS — R06.02 SOB (SHORTNESS OF BREATH) ON EXERTION: ICD-10-CM

## 2023-07-13 DIAGNOSIS — Z87.898 HISTORY OF SYNCOPE: ICD-10-CM

## 2023-07-13 PROCEDURE — 3080F DIAST BP >= 90 MM HG: CPT | Performed by: INTERNAL MEDICINE

## 2023-07-13 PROCEDURE — G8427 DOCREV CUR MEDS BY ELIG CLIN: HCPCS | Performed by: INTERNAL MEDICINE

## 2023-07-13 PROCEDURE — 1036F TOBACCO NON-USER: CPT | Performed by: INTERNAL MEDICINE

## 2023-07-13 PROCEDURE — 3077F SYST BP >= 140 MM HG: CPT | Performed by: INTERNAL MEDICINE

## 2023-07-13 PROCEDURE — 99214 OFFICE O/P EST MOD 30 MIN: CPT | Performed by: INTERNAL MEDICINE

## 2023-07-13 PROCEDURE — 1123F ACP DISCUSS/DSCN MKR DOCD: CPT | Performed by: INTERNAL MEDICINE

## 2023-07-13 PROCEDURE — 3017F COLORECTAL CA SCREEN DOC REV: CPT | Performed by: INTERNAL MEDICINE

## 2023-07-13 PROCEDURE — G8417 CALC BMI ABV UP PARAM F/U: HCPCS | Performed by: INTERNAL MEDICINE

## 2023-07-13 NOTE — PROGRESS NOTES
Chief Complaint   Patient presents with    Follow-Up from [facility]     Originally  patient here for check up ref by PCP syncope -EKG changes      Pt here for a 2 week hospital f/u - event monitor    Catina Hernandez was admitted for  worsening of dizziness and found to have Euvolemic hypoosmolar hyponatremia resolved- related to recent start of HCTZ 25 po qd for leg edema     Post D/c feel better    EKG done 6-26-23    Hx of Dizziness  Improved  Markedly after hydration-     Denied cp or edema or palpitation    Sob on exertion- better    NO  plapitation    Hx of syncope- last and fisrt July 28, 2021  predrome of dizziness and syncope  No post drome  It was hot day    Drink 64 oz  Now and used to drink 32 oz    Nonsmoker    [address] [place]  Father had CABG in his 76        Past Surgical History:   Procedure Laterality Date    ARM SURGERY Right 04/14/2021    EXCISION OF MELANOMA RIGHT UPPER ARM WITH SENTNEL LYMPH NODE BIOPSY RIGHT AXILLA performed by Svitlana Paul MD at [address]  2021    [facility]    DILATATION, ESOPHAGUS      HERNIA REPAIR Right 2/23/2023    Robotic Right Inguinal Hernia Repair with Mesh performed by Shoaib Carrasco MD at [address] [facility] Left     30 years ago    KNEE SURGERY Left     OTHER SURGICAL HISTORY  04/04/2017    robotic ileocecectomy-Dr POWER [facility]    SKIN CANCER EXCISION  03/2021    melanoma removal Dr Ashok Harding- also removed lymph nodes     TURP N/A 2/3/2023    CYSTO, GREENLIGHT PHOTO VAPORIZATION OF PROSTATE performed by Husam Pro MD at [address]   Allergen Reactions    Penicillins      Unsure of reaction HAPPENED WHEN A WAS A TEENAGER OR EARLY TWENTY'S        Family History   Problem Relation Age of Onset    Heart Disease Father     Cancer Father         Social History     Socioeconomic History    Marital status:      Spouse name: Not on file    Number of children: Not on file    Years of education: Not on file    Highest education

## 2023-07-19 ENCOUNTER — HOSPITAL ENCOUNTER (OUTPATIENT)
Dept: NON INVASIVE DIAGNOSTICS | Age: 73
Discharge: HOME | End: 2023-07-19
Payer: MEDICARE

## 2023-07-19 DIAGNOSIS — Z87.898 HISTORY OF SYNCOPE: ICD-10-CM

## 2023-07-19 DIAGNOSIS — R94.31 ABNORMAL EKG: ICD-10-CM

## 2023-07-19 DIAGNOSIS — R42 DIZZINESS: ICD-10-CM

## 2023-07-19 PROCEDURE — 93017 CV STRESS TEST TRACING ONLY: CPT | Performed by: INTERNAL MEDICINE

## 2023-07-19 PROCEDURE — 78452 HT MUSCLE IMAGE SPECT MULT: CPT | Performed by: INTERNAL MEDICINE

## 2023-07-19 PROCEDURE — 3430000000 HC RX DIAGNOSTIC RADIOPHARMACEUTICAL

## 2023-07-19 PROCEDURE — A9500 TC99M SESTAMIBI: HCPCS

## 2023-07-19 PROCEDURE — 6360000002 HC RX W HCPCS

## 2023-07-19 RX ORDER — TETRAKIS(2-METHOXYISOBUTYLISOCYANIDE)COPPER(I) TETRAFLUOROBORATE 1 MG/ML
29.5 INJECTION, POWDER, LYOPHILIZED, FOR SOLUTION INTRAVENOUS
Status: COMPLETED | OUTPATIENT
Start: 2023-07-19 | End: 2023-07-19

## 2023-07-19 RX ORDER — TETRAKIS(2-METHOXYISOBUTYLISOCYANIDE)COPPER(I) TETRAFLUOROBORATE 1 MG/ML
9.4 INJECTION, POWDER, LYOPHILIZED, FOR SOLUTION INTRAVENOUS
Status: COMPLETED | OUTPATIENT
Start: 2023-07-19 | End: 2023-07-19

## 2023-07-19 RX ADMIN — Medication 29.5 MILLICURIE: at 08:23

## 2023-07-19 RX ADMIN — Medication 9.4 MILLICURIE: at 07:17

## 2023-12-04 ENCOUNTER — HOSPITAL ENCOUNTER (OUTPATIENT)
Dept: GENERAL RADIOLOGY | Age: 73
Discharge: HOME | End: 2023-12-04
Payer: MEDICARE

## 2023-12-04 ENCOUNTER — HOSPITAL ENCOUNTER (OUTPATIENT)
Age: 73
Discharge: HOME | End: 2023-12-04
Payer: MEDICARE

## 2023-12-04 DIAGNOSIS — E55.9 VITAMIN D DEFICIENCY: ICD-10-CM

## 2023-12-04 DIAGNOSIS — Z85.820 PERSONAL HISTORY OF MALIGNANT MELANOMA OF SKIN: ICD-10-CM

## 2023-12-04 DIAGNOSIS — Z87.891 PERSONAL HISTORY OF TOBACCO USE, PRESENTING HAZARDS TO HEALTH: ICD-10-CM

## 2023-12-04 LAB
25(OH)D3 SERPL-MCNC: 76 NG/ML (ref 30–100)
ALBUMIN SERPL BCG-MCNC: 4.4 G/DL (ref 3.5–5.1)
ALP SERPL-CCNC: 107 U/L (ref 38–126)
ALT SERPL W/O P-5'-P-CCNC: 12 U/L (ref 11–66)
ANION GAP SERPL CALC-SCNC: 14 MEQ/L (ref 8–16)
AST SERPL-CCNC: 16 U/L (ref 5–40)
BILIRUB CONJ SERPL-MCNC: < 0.2 MG/DL (ref 0–0.3)
BILIRUB SERPL-MCNC: 0.5 MG/DL (ref 0.3–1.2)
BUN SERPL-MCNC: 13 MG/DL (ref 7–22)
CALCIUM SERPL-MCNC: 9.5 MG/DL (ref 8.5–10.5)
CHLORIDE SERPL-SCNC: 103 MEQ/L (ref 98–111)
CO2 SERPL-SCNC: 23 MEQ/L (ref 23–33)
CREAT SERPL-MCNC: 0.8 MG/DL (ref 0.4–1.2)
CRP SERPL-MCNC: < 0.3 MG/DL (ref 0–1)
DEPRECATED RDW RBC AUTO: 44.6 FL (ref 35–45)
ERYTHROCYTE [DISTWIDTH] IN BLOOD BY AUTOMATED COUNT: 12.9 % (ref 11.5–14.5)
GFR SERPL CREATININE-BSD FRML MDRD: > 60 ML/MIN/1.73M2
GLUCOSE FASTING: 100 MG/DL (ref 70–108)
HCT VFR BLD AUTO: 44.8 % (ref 42–52)
HGB BLD-MCNC: 14.8 GM/DL (ref 14–18)
MCH RBC QN AUTO: 30.9 PG (ref 26–33)
MCHC RBC AUTO-ENTMCNC: 33 GM/DL (ref 32.2–35.5)
MCV RBC AUTO: 93.5 FL (ref 80–94)
PLATELET # BLD AUTO: 297 THOU/MM3 (ref 130–400)
PMV BLD AUTO: 10.7 FL (ref 9.4–12.4)
POTASSIUM SERPL-SCNC: 4 MEQ/L (ref 3.5–5.2)
PROT SERPL-MCNC: 7.4 G/DL (ref 6.1–8)
RBC # BLD AUTO: 4.79 MILL/MM3 (ref 4.7–6.1)
SODIUM SERPL-SCNC: 140 MEQ/L (ref 135–145)
WBC # BLD AUTO: 6.1 THOU/MM3 (ref 4.8–10.8)

## 2023-12-04 PROCEDURE — 71046 X-RAY EXAM CHEST 2 VIEWS: CPT

## 2023-12-04 PROCEDURE — 85027 COMPLETE CBC AUTOMATED: CPT

## 2023-12-04 PROCEDURE — 82306 VITAMIN D 25 HYDROXY: CPT

## 2023-12-04 PROCEDURE — 80048 BASIC METABOLIC PNL TOTAL CA: CPT

## 2023-12-04 PROCEDURE — 80076 HEPATIC FUNCTION PANEL: CPT

## 2023-12-04 PROCEDURE — 86140 C-REACTIVE PROTEIN: CPT

## 2023-12-04 PROCEDURE — 36415 COLL VENOUS BLD VENIPUNCTURE: CPT

## 2024-01-10 ENCOUNTER — NURSE ONLY (OUTPATIENT)
Dept: LAB | Age: 74
End: 2024-01-10

## 2024-01-10 LAB
DEPRECATED MEAN GLUCOSE BLD GHB EST-ACNC: 105 MG/DL (ref 70–126)
HBA1C MFR BLD HPLC: 5.5 % (ref 4.4–6.4)
TSH SERPL DL<=0.005 MIU/L-ACNC: 1.87 UIU/ML (ref 0.4–4.2)

## 2024-01-11 ENCOUNTER — OFFICE VISIT (OUTPATIENT)
Dept: CARDIOLOGY CLINIC | Age: 74
End: 2024-01-11

## 2024-01-11 VITALS
BODY MASS INDEX: 29.03 KG/M2 | SYSTOLIC BLOOD PRESSURE: 144 MMHG | HEART RATE: 76 BPM | HEIGHT: 69 IN | WEIGHT: 196 LBS | DIASTOLIC BLOOD PRESSURE: 80 MMHG

## 2024-01-11 DIAGNOSIS — R42 DIZZINESS: Primary | ICD-10-CM

## 2024-01-11 DIAGNOSIS — R94.31 ABNORMAL EKG: ICD-10-CM

## 2024-01-11 DIAGNOSIS — Z87.898 HISTORY OF SYNCOPE: ICD-10-CM

## 2024-01-11 DIAGNOSIS — I10 ESSENTIAL HYPERTENSION: ICD-10-CM

## 2024-01-11 DIAGNOSIS — R06.02 SOB (SHORTNESS OF BREATH) ON EXERTION: ICD-10-CM

## 2024-01-11 RX ORDER — GLUCOSAMINE/MSM/CHONDROITIN A 500-83-400
TABLET ORAL
COMMUNITY

## 2024-01-11 RX ORDER — AMLODIPINE BESYLATE 10 MG/1
10 TABLET ORAL DAILY
Qty: 90 TABLET | Refills: 3 | Status: SHIPPED | OUTPATIENT
Start: 2024-01-11

## 2024-01-11 NOTE — PROGRESS NOTES
Chief Complaint   Patient presents with    1 Year Follow Up     Originally  patient here for check up ref by PCP syncope -EKG changes     event monitor        1 year follow up .    EKG done 7-1-2023.    Hx of Dizziness  Improved  Markedly after hydration-     Denied cp or edema or palpitation    Sob on exertion- better    NO  plapitation      Pat was admitted for  worsening of dizziness and found to have Euvolemic hypoosmolar hyponatremia resolved- related to recent start of HCTZ 25 po qd for leg edema           Hx of syncope- last and fisrt July 28, 2021  predrome of dizziness and syncope  No post drome  It was hot day    Drink 64 oz  Now and used to drink 32 oz    Nonsmoker    FHX  Father had CABG in his 68        Past Surgical History:   Procedure Laterality Date    ARM SURGERY Right 04/14/2021    EXCISION OF MELANOMA RIGHT UPPER ARM WITH SENTNEL LYMPH NODE BIOPSY RIGHT AXILLA performed by Ronnie Snatos MD at [facility]    COLONOSCOPY  2021    [facility]    DILATATION, ESOPHAGUS      HERNIA REPAIR Right 2/23/2023    Robotic Right Inguinal Hernia Repair with Mesh performed by Lester Espino MD at [facility] OR    INGUINAL HERNIA REPAIR Left     30 years ago    KNEE SURGERY Left     OTHER SURGICAL HISTORY  04/04/2017    robotic ileocecectomy-Dr Espino    SKIN CANCER EXCISION  03/2021    melanoma removal Dr Santos- also removed lymph nodes     TURP N/A 2/3/2023    CYSTO, GREENLIGHT PHOTO VAPORIZATION OF PROSTATE performed by Yao Ruby MD at [facility] OR       Allergies   Allergen Reactions    Penicillins      Unsure of reaction HAPPENED WHEN A WAS A TEENAGER OR EARLY TWENTY'S        Family History   Problem Relation Age of Onset    Heart Disease Father     Cancer Father         Social History     Socioeconomic History    Marital status:      Spouse name: Not on file    Number of children: Not on file    Years of education: Not on file    Highest education level: Not on file   Occupational History

## 2024-02-02 ENCOUNTER — TELEPHONE (OUTPATIENT)
Dept: UROLOGY | Age: 74
End: 2024-02-02

## 2024-02-02 DIAGNOSIS — N40.1 BENIGN LOCALIZED PROSTATIC HYPERPLASIA WITH LOWER URINARY TRACT SYMPTOMS (LUTS): Primary | ICD-10-CM

## 2024-02-02 NOTE — TELEPHONE ENCOUNTER
Fanny at PCP office [phone] left a message asking if we were going to be managing PSA?    Last PSA 03/29/2023 on 0.38. Next office visit 06/11/2024    No orders placed for PSA

## 2024-02-02 NOTE — TELEPHONE ENCOUNTER
If patient would like us to follow his PSA we can. We have been following him for BPH and ED. PSA appears to be stable over time, no concerned for elevated PSA at this time.

## 2024-02-02 NOTE — TELEPHONE ENCOUNTER
Patient would like to have PSA followed here. He prefers to check a PSA prior to appointment.    Order placed and sent via mail.

## 2024-05-20 ENCOUNTER — NURSE ONLY (OUTPATIENT)
Dept: LAB | Age: 74
End: 2024-05-20

## 2024-05-20 DIAGNOSIS — N40.1 BENIGN LOCALIZED PROSTATIC HYPERPLASIA WITH LOWER URINARY TRACT SYMPTOMS (LUTS): ICD-10-CM

## 2024-05-20 LAB — PSA SERPL-MCNC: 0.36 NG/ML (ref 0–1)

## 2024-06-03 DIAGNOSIS — N40.1 BENIGN LOCALIZED PROSTATIC HYPERPLASIA WITH LOWER URINARY TRACT SYMPTOMS (LUTS): Primary | ICD-10-CM

## 2024-06-03 RX ORDER — FINASTERIDE 5 MG/1
5 TABLET, FILM COATED ORAL DAILY
Qty: 90 TABLET | Refills: 3 | Status: SHIPPED | OUTPATIENT
Start: 2024-06-03

## 2024-06-03 NOTE — TELEPHONE ENCOUNTER
Abhilash Treviño called requesting a refill on the following medications:  Requested Prescriptions     Pending Prescriptions Disp Refills    finasteride (PROSCAR) 5 MG tablet [Pharmacy Med Name: Finasteride Oral Tablet 5 MG] 90 tablet 0     Sig: TAKE 1 TABLET BY MOUTH EVERY DAY     Pharmacy verified:  [email or web address]      Date of last visit: 06/13/2023  Date of next visit (if applicable): 6/20/2024

## 2024-06-20 ENCOUNTER — OFFICE VISIT (OUTPATIENT)
Dept: UROLOGY | Age: 74
End: 2024-06-20

## 2024-06-20 VITALS
HEIGHT: 69 IN | WEIGHT: 190.4 LBS | SYSTOLIC BLOOD PRESSURE: 142 MMHG | BODY MASS INDEX: 28.2 KG/M2 | DIASTOLIC BLOOD PRESSURE: 88 MMHG

## 2024-06-20 DIAGNOSIS — N40.1 BENIGN LOCALIZED PROSTATIC HYPERPLASIA WITH LOWER URINARY TRACT SYMPTOMS (LUTS): Primary | ICD-10-CM

## 2024-06-20 LAB — POST VOID RESIDUAL (PVR): 253 ML

## 2024-06-20 RX ORDER — TROSPIUM CHLORIDE 20 MG/1
20 TABLET, FILM COATED ORAL NIGHTLY
Qty: 90 TABLET | Refills: 3 | Status: SHIPPED | OUTPATIENT
Start: 2024-06-20

## 2024-06-20 RX ORDER — MULTIVIT WITH MINERALS/LUTEIN
250 TABLET ORAL DAILY
COMMUNITY

## 2024-06-20 ASSESSMENT — ENCOUNTER SYMPTOMS
NAUSEA: 0
VOMITING: 0
ABDOMINAL PAIN: 0
BACK PAIN: 0

## 2024-06-20 NOTE — PROGRESS NOTES
[facility] [place] [facility]  [facility] UROLOGY  [address]  [address]  [facility] [address]  Dept: [phone]  Loc: [phone]    Visit Date: 6/20/2024        HPI:     Abhilash Treviño is a 73 y.o. male who presents today for:  Chief Complaint   Patient presents with    Benign Prostatic Hypertrophy       HPI    Pt seen in follow up for BPH.      Pt has a hx of BPH with nocturia up to 10 x per night and urinary retention.  Silodosin failed to improve symptoms.  Currently on oxybutynin 5-10 mg immediate release (prescribed by pcp) and finasteride 5 mg daily.       Underwent cystoscopy, greenlight photovaporization of the prostate 2/3/23 by Dr. Ruby.       Waking 3-5 x per night to urinate.  (Improved from 8-10 x per night in the past).  Denies dysuria.  No hematuria.      Current Outpatient Medications   Medication Sig Dispense Refill    Ascorbic Acid (VITAMIN C) 250 MG tablet Take 1 tablet by mouth daily gummies      finasteride (PROSCAR) 5 MG tablet TAKE 1 TABLET BY MOUTH EVERY DAY 90 tablet 3    Coenzyme Q10 (COQ-10) 30 MG CAPS Take by mouth      amLODIPine (NORVASC) 10 MG tablet Take 1 tablet by mouth daily 90 tablet 3    Magnesium 400 MG TABS Take 1 tablet by mouth daily      Cholecalciferol (D3 ADULT PO) Take 400 mg by mouth in the morning and at bedtime      medical marijuana Take by mouth as needed. Gummys       No current facility-administered medications for this visit.       Past Medical History  Abhilash  has a past medical history of Arthritis, Cancer (HCC), Chronic sinusitis, Hypertension, and Vitamin D deficiency.    Past Surgical History  The patient  has a past surgical history that includes Colonoscopy (2021); Inguinal hernia repair (Left); knee surgery (Left); Dilatation, esophagus; other surgical history (04/04/2017); Arm Surgery (Right, 04/14/2021); Skin cancer excision (03/2021); TURP (N/A, 2/3/2023); and hernia repair (Right, 2/23/2023).    Family History  This

## 2024-07-05 ENCOUNTER — HOSPITAL ENCOUNTER (OUTPATIENT)
Age: 74
Discharge: HOME | End: 2024-07-05
Payer: MEDICARE

## 2024-07-05 ENCOUNTER — HOSPITAL ENCOUNTER (OUTPATIENT)
Dept: GENERAL RADIOLOGY | Age: 74
Discharge: HOME | End: 2024-07-05
Payer: MEDICARE

## 2024-07-05 DIAGNOSIS — Z01.818 PREOP EXAMINATION: ICD-10-CM

## 2024-07-05 LAB
ALBUMIN SERPL BCG-MCNC: 4.7 G/DL (ref 3.5–5.1)
ALP SERPL-CCNC: 104 U/L (ref 38–126)
ALT SERPL W/O P-5'-P-CCNC: 17 U/L (ref 11–66)
ANION GAP SERPL CALC-SCNC: 10 MEQ/L (ref 8–16)
AST SERPL-CCNC: 19 U/L (ref 5–40)
BILIRUB CONJ SERPL-MCNC: 0.2 MG/DL (ref 0.1–13.8)
BILIRUB SERPL-MCNC: 0.5 MG/DL (ref 0.3–1.2)
BUN SERPL-MCNC: 16 MG/DL (ref 7–22)
CALCIUM SERPL-MCNC: 9.7 MG/DL (ref 8.5–10.5)
CHLORIDE SERPL-SCNC: 101 MEQ/L (ref 98–111)
CO2 SERPL-SCNC: 29 MEQ/L (ref 23–33)
CREAT SERPL-MCNC: 0.8 MG/DL (ref 0.4–1.2)
CRP SERPL-MCNC: < 0.3 MG/DL (ref 0–1)
DEPRECATED RDW RBC AUTO: 45.1 FL (ref 35–45)
ERYTHROCYTE [DISTWIDTH] IN BLOOD BY AUTOMATED COUNT: 13.3 % (ref 11.5–14.5)
GFR SERPL CREATININE-BSD FRML MDRD: > 90 ML/MIN/1.73M2
GLUCOSE SERPL-MCNC: 96 MG/DL (ref 70–108)
HCT VFR BLD AUTO: 45.9 % (ref 42–52)
HGB BLD-MCNC: 15.1 GM/DL (ref 14–18)
MCH RBC QN AUTO: 30.6 PG (ref 26–33)
MCHC RBC AUTO-ENTMCNC: 32.9 GM/DL (ref 32.2–35.5)
MCV RBC AUTO: 92.9 FL (ref 80–94)
PLATELET # BLD AUTO: 333 THOU/MM3 (ref 130–400)
PMV BLD AUTO: 10.6 FL (ref 9.4–12.4)
POTASSIUM SERPL-SCNC: 4.4 MEQ/L (ref 3.5–5.2)
PROT SERPL-MCNC: 7.5 G/DL (ref 6.1–8)
RBC # BLD AUTO: 4.94 MILL/MM3 (ref 4.7–6.1)
SODIUM SERPL-SCNC: 140 MEQ/L (ref 135–145)
WBC # BLD AUTO: 5.4 THOU/MM3 (ref 4.8–10.8)

## 2024-07-05 PROCEDURE — 86140 C-REACTIVE PROTEIN: CPT

## 2024-07-05 PROCEDURE — 71046 X-RAY EXAM CHEST 2 VIEWS: CPT

## 2024-07-05 PROCEDURE — 36415 COLL VENOUS BLD VENIPUNCTURE: CPT

## 2024-07-05 PROCEDURE — 82248 BILIRUBIN DIRECT: CPT

## 2024-07-05 PROCEDURE — 85027 COMPLETE CBC AUTOMATED: CPT

## 2024-07-05 PROCEDURE — 80053 COMPREHEN METABOLIC PANEL: CPT

## 2024-08-05 ENCOUNTER — LAB (OUTPATIENT)
Dept: LAB | Age: 74
End: 2024-08-05

## 2024-08-05 LAB
CHOLEST SERPL-MCNC: 245 MG/DL (ref 100–199)
HDLC SERPL-MCNC: 98 MG/DL
LDLC SERPL CALC-MCNC: 130 MG/DL
TRIGL SERPL-MCNC: 83 MG/DL (ref 0–199)

## 2024-10-10 ENCOUNTER — OFFICE VISIT (OUTPATIENT)
Dept: UROLOGY | Age: 74
End: 2024-10-10

## 2024-10-10 VITALS
SYSTOLIC BLOOD PRESSURE: 122 MMHG | WEIGHT: 198 LBS | HEIGHT: 69 IN | DIASTOLIC BLOOD PRESSURE: 72 MMHG | BODY MASS INDEX: 29.33 KG/M2

## 2024-10-10 DIAGNOSIS — N40.1 BENIGN LOCALIZED PROSTATIC HYPERPLASIA WITH LOWER URINARY TRACT SYMPTOMS (LUTS): Primary | ICD-10-CM

## 2024-10-10 LAB — POST VOID RESIDUAL (PVR): 20 ML

## 2024-10-10 RX ORDER — UBIDECARENONE 50 MG
CAPSULE ORAL 4 TIMES DAILY
COMMUNITY

## 2024-10-10 RX ORDER — AMOXICILLIN 500 MG
CAPSULE ORAL 3 TIMES DAILY
COMMUNITY

## 2024-10-10 ASSESSMENT — ENCOUNTER SYMPTOMS
ABDOMINAL PAIN: 0
NAUSEA: 0
VOMITING: 0
BACK PAIN: 0

## 2024-10-10 NOTE — PROGRESS NOTES
(2021); Inguinal hernia repair (Left); knee surgery (Left); Dilatation, esophagus; other surgical history (04/04/2017); Arm Surgery (Right, 04/14/2021); Skin cancer excision (03/2021); TURP (N/A, 2/3/2023); and hernia repair (Right, 2/23/2023).    Family History  This patient's family history includes Cancer in his father; Heart Disease in his father.    Social History  Abhilash  reports that he quit smoking about 22 years ago. His smoking use included cigarettes. He started smoking about 56 years ago. He has a 33.4 pack-year smoking history. He has never used smokeless tobacco. He reports current alcohol use of about 2.0 standard drinks of alcohol per week. He reports current drug use. Drug: Marijuana (Weed).      Subjective:      Review of Systems   Constitutional:  Negative for activity change, appetite change, chills, diaphoresis, fatigue, fever and unexpected weight change.   Gastrointestinal:  Negative for abdominal pain, nausea and vomiting.   Genitourinary:  Negative for decreased urine volume, difficulty urinating, dysuria, flank pain, frequency, hematuria and urgency.        Nocturia 4 x per night. IPSS 24/35.    Musculoskeletal:  Negative for back pain.       Objective:   /72   Ht 1.753 m (5' 9.02\")   Wt 89.8 kg (198 lb)   BMI 29.23 kg/m²     Physical Exam  Vitals reviewed.   Constitutional:       General: He is not in acute distress.     Appearance: Normal appearance. He is well-developed. He is not ill-appearing or diaphoretic.   HENT:      Head: Normocephalic and atraumatic.      Right Ear: External ear normal.      Left Ear: External ear normal.      Nose: Nose normal.      Mouth/Throat:      Mouth: Mucous membranes are moist.   Eyes:      General: No scleral icterus.        Right eye: No discharge.         Left eye: No discharge.   Neck:      Vascular: No JVD.      Trachea: No tracheal deviation.   Cardiovascular:      Rate and Rhythm: Normal rate and regular rhythm.   Pulmonary:      Effort:

## 2024-10-11 SDOH — HEALTH STABILITY: PHYSICAL HEALTH: ON AVERAGE, HOW MANY MINUTES DO YOU ENGAGE IN EXERCISE AT THIS LEVEL?: 20 MIN

## 2024-10-11 SDOH — HEALTH STABILITY: PHYSICAL HEALTH: ON AVERAGE, HOW MANY DAYS PER WEEK DO YOU ENGAGE IN MODERATE TO STRENUOUS EXERCISE (LIKE A BRISK WALK)?: 6 DAYS

## 2024-10-14 ENCOUNTER — OFFICE VISIT (OUTPATIENT)
Dept: FAMILY MEDICINE CLINIC | Age: 74
End: 2024-10-14

## 2024-10-14 VITALS
DIASTOLIC BLOOD PRESSURE: 72 MMHG | WEIGHT: 198 LBS | TEMPERATURE: 97.6 F | BODY MASS INDEX: 29.23 KG/M2 | SYSTOLIC BLOOD PRESSURE: 122 MMHG | HEART RATE: 88 BPM | RESPIRATION RATE: 16 BRPM

## 2024-10-14 DIAGNOSIS — I10 ESSENTIAL HYPERTENSION: Primary | ICD-10-CM

## 2024-10-14 DIAGNOSIS — Z12.5 SCREENING PSA (PROSTATE SPECIFIC ANTIGEN): ICD-10-CM

## 2024-10-14 DIAGNOSIS — E78.00 PURE HYPERCHOLESTEROLEMIA: ICD-10-CM

## 2024-10-14 DIAGNOSIS — N40.1 BENIGN PROSTATIC HYPERPLASIA WITH LOWER URINARY TRACT SYMPTOMS, SYMPTOM DETAILS UNSPECIFIED: ICD-10-CM

## 2024-10-14 DIAGNOSIS — K59.09 OTHER CONSTIPATION: ICD-10-CM

## 2024-10-14 DIAGNOSIS — J30.2 SEASONAL ALLERGIES: ICD-10-CM

## 2024-10-14 SDOH — ECONOMIC STABILITY: FOOD INSECURITY: WITHIN THE PAST 12 MONTHS, THE FOOD YOU BOUGHT JUST DIDN'T LAST AND YOU DIDN'T HAVE MONEY TO GET MORE.: NEVER TRUE

## 2024-10-14 SDOH — ECONOMIC STABILITY: FOOD INSECURITY: WITHIN THE PAST 12 MONTHS, YOU WORRIED THAT YOUR FOOD WOULD RUN OUT BEFORE YOU GOT MONEY TO BUY MORE.: NEVER TRUE

## 2024-10-14 SDOH — ECONOMIC STABILITY: INCOME INSECURITY: HOW HARD IS IT FOR YOU TO PAY FOR THE VERY BASICS LIKE FOOD, HOUSING, MEDICAL CARE, AND HEATING?: NOT HARD AT ALL

## 2024-10-14 ASSESSMENT — PATIENT HEALTH QUESTIONNAIRE - PHQ9
SUM OF ALL RESPONSES TO PHQ QUESTIONS 1-9: 0
1. LITTLE INTEREST OR PLEASURE IN DOING THINGS: NOT AT ALL
SUM OF ALL RESPONSES TO PHQ9 QUESTIONS 1 & 2: 0
SUM OF ALL RESPONSES TO PHQ QUESTIONS 1-9: 0
2. FEELING DOWN, DEPRESSED OR HOPELESS: NOT AT ALL

## 2024-10-14 ASSESSMENT — ENCOUNTER SYMPTOMS
ABDOMINAL PAIN: 0
SINUS PAIN: 0
DIARRHEA: 0
SORE THROAT: 0
COUGH: 0
WHEEZING: 0
SHORTNESS OF BREATH: 0
VOMITING: 0
SINUS PRESSURE: 0
CONSTIPATION: 1
NAUSEA: 0

## 2024-10-14 NOTE — PROGRESS NOTES
Denies blood or mucus in stool.  Last colonoscopy 2021.      Objective:     Vitals:    10/14/24 1021   BP: 122/72   Pulse: 88   Resp: 16   Temp: 97.6 °F (36.4 °C)   TempSrc: Oral   Weight: 89.8 kg (198 lb)       Wt Readings from Last 3 Encounters:   10/14/24 89.8 kg (198 lb)   10/10/24 89.8 kg (198 lb)   06/20/24 86.4 kg (190 lb 6.4 oz)       BP Readings from Last 3 Encounters:   10/14/24 122/72   10/10/24 122/72   06/20/24 (!) 142/88       Lab Results   Component Value Date    WBC 5.4 07/05/2024    HGB 15.1 07/05/2024    HCT 45.9 07/05/2024    MCV 92.9 07/05/2024     07/05/2024     Lab Results   Component Value Date     07/05/2024    K 4.4 07/05/2024     07/05/2024    CO2 29 07/05/2024    BUN 16 07/05/2024    CREATININE 0.8 07/05/2024    GLUCOSE 96 07/05/2024    CALCIUM 9.7 07/05/2024    BILITOT 0.5 07/05/2024    ALKPHOS 104 07/05/2024    AST 19 07/05/2024    ALT 17 07/05/2024    LABGLOM > 90 07/05/2024     Lab Results   Component Value Date    TSH 1.870 01/10/2024    W3XUISD 98 08/06/2019     Lab Results   Component Value Date    LABA1C 5.5 01/10/2024     Lab Results   Component Value Date     01/10/2024     Lab Results   Component Value Date    CHOL 245 (H) 08/05/2024    CHOL 196 11/01/2019    CHOL 200 08/06/2019     Lab Results   Component Value Date    TRIG 83 08/05/2024    TRIG 67 11/01/2019    TRIG 50 08/06/2019     Lab Results   Component Value Date    HDL 98 08/05/2024    HDL 81 11/01/2019    HDL 71 08/06/2019     No components found for: \"LDLCHOLESTEROL\", \"LDLCALC\"    No results found for: \"DZPN72TLF\"    Review of Systems   Constitutional:  Negative for activity change, chills, fatigue and fever.   HENT:  Positive for congestion and postnasal drip. Negative for sinus pressure, sinus pain and sore throat.    Eyes:  Negative for visual disturbance.   Respiratory:  Negative for cough, shortness of breath and wheezing.    Cardiovascular:  Negative for chest pain and palpitations.

## 2024-12-27 ENCOUNTER — HOSPITAL ENCOUNTER (OUTPATIENT)
Age: 74
Discharge: HOME | End: 2024-12-27
Payer: MEDICARE

## 2024-12-27 ENCOUNTER — HOSPITAL ENCOUNTER (OUTPATIENT)
Dept: GENERAL RADIOLOGY | Age: 74
Discharge: HOME | End: 2024-12-27
Payer: MEDICARE

## 2024-12-27 DIAGNOSIS — Z01.818 PRE-OP EVALUATION: ICD-10-CM

## 2024-12-27 LAB
ALBUMIN SERPL BCG-MCNC: 4.3 G/DL (ref 3.5–5.1)
ALP SERPL-CCNC: 104 U/L (ref 38–126)
ALT SERPL W/O P-5'-P-CCNC: 19 U/L (ref 11–66)
ANION GAP SERPL CALC-SCNC: 11 MEQ/L (ref 8–16)
AST SERPL-CCNC: 20 U/L (ref 5–40)
BILIRUB CONJ SERPL-MCNC: 0.2 MG/DL (ref 0.1–13.8)
BILIRUB SERPL-MCNC: 0.5 MG/DL (ref 0.3–1.2)
BUN SERPL-MCNC: 20 MG/DL (ref 7–22)
CALCIUM SERPL-MCNC: 9.4 MG/DL (ref 8.5–10.5)
CHLORIDE SERPL-SCNC: 101 MEQ/L (ref 98–111)
CO2 SERPL-SCNC: 26 MEQ/L (ref 23–33)
CREAT SERPL-MCNC: 0.8 MG/DL (ref 0.4–1.2)
CRP SERPL-MCNC: < 0.3 MG/DL (ref 0–1)
DEPRECATED RDW RBC AUTO: 45.9 FL (ref 35–45)
ERYTHROCYTE [DISTWIDTH] IN BLOOD BY AUTOMATED COUNT: 13.4 % (ref 11.5–14.5)
GFR SERPL CREATININE-BSD FRML MDRD: > 90 ML/MIN/1.73M2
GLUCOSE SERPL-MCNC: 95 MG/DL (ref 70–108)
HCT VFR BLD AUTO: 44.4 % (ref 42–52)
HGB BLD-MCNC: 14.4 GM/DL (ref 14–18)
MCH RBC QN AUTO: 30.2 PG (ref 26–33)
MCHC RBC AUTO-ENTMCNC: 32.4 GM/DL (ref 32.2–35.5)
MCV RBC AUTO: 93.1 FL (ref 80–94)
PLATELET # BLD AUTO: 314 THOU/MM3 (ref 130–400)
PMV BLD AUTO: 10.2 FL (ref 9.4–12.4)
POTASSIUM SERPL-SCNC: 4.3 MEQ/L (ref 3.5–5.2)
PROT SERPL-MCNC: 7.2 G/DL (ref 6.1–8)
RBC # BLD AUTO: 4.77 MILL/MM3 (ref 4.7–6.1)
SODIUM SERPL-SCNC: 138 MEQ/L (ref 135–145)
WBC # BLD AUTO: 5.6 THOU/MM3 (ref 4.8–10.8)

## 2024-12-27 PROCEDURE — 82248 BILIRUBIN DIRECT: CPT

## 2024-12-27 PROCEDURE — 36415 COLL VENOUS BLD VENIPUNCTURE: CPT

## 2024-12-27 PROCEDURE — 80053 COMPREHEN METABOLIC PANEL: CPT

## 2024-12-27 PROCEDURE — 71046 X-RAY EXAM CHEST 2 VIEWS: CPT

## 2024-12-27 PROCEDURE — 85027 COMPLETE CBC AUTOMATED: CPT

## 2024-12-27 PROCEDURE — 86140 C-REACTIVE PROTEIN: CPT

## 2025-01-09 ENCOUNTER — OFFICE VISIT (OUTPATIENT)
Dept: CARDIOLOGY CLINIC | Age: 75
End: 2025-01-09

## 2025-01-09 VITALS
SYSTOLIC BLOOD PRESSURE: 161 MMHG | HEART RATE: 84 BPM | DIASTOLIC BLOOD PRESSURE: 89 MMHG | HEIGHT: 69 IN | WEIGHT: 198.2 LBS | BODY MASS INDEX: 29.36 KG/M2

## 2025-01-09 DIAGNOSIS — R94.31 ABNORMAL EKG: ICD-10-CM

## 2025-01-09 DIAGNOSIS — Z87.898 HISTORY OF SYNCOPE: ICD-10-CM

## 2025-01-09 DIAGNOSIS — R06.02 SOB (SHORTNESS OF BREATH) ON EXERTION: ICD-10-CM

## 2025-01-09 DIAGNOSIS — R42 DIZZINESS: ICD-10-CM

## 2025-01-09 DIAGNOSIS — I10 ESSENTIAL HYPERTENSION: Primary | ICD-10-CM

## 2025-01-09 RX ORDER — AMLODIPINE BESYLATE 10 MG/1
10 TABLET ORAL DAILY
Qty: 90 TABLET | Refills: 3 | Status: SHIPPED | OUTPATIENT
Start: 2025-01-09

## 2025-01-09 NOTE — PROGRESS NOTES
Chief Complaint   Patient presents with    1 Year Follow Up     Originally  patient here for check up ref by PCP syncope -EKG changes        1 year follow up.    EKG done today.    Hx of Dizziness  Improved  Markedly after hydration-     Denied cp or edema or palpitation    Sob on exertion- better    NO  plapitation      Jun 2023 Pat was admitted for  worsening of dizziness and found to have Euvolemic hypoosmolar hyponatremia resolved- related to recent start of HCTZ 25 po qd for leg edema       Hx of syncope- last and fisrt July 28, 2021  predrome of dizziness and syncope  No post drome  It was hot day    Drink 64 oz  Now and used to drink 32 oz    Nonsmoker    FHX  Father had CABG in his 68        Past Surgical History:   Procedure Laterality Date    ARM SURGERY Right 04/14/2021    EXCISION OF MELANOMA RIGHT UPPER ARM WITH SENTNEL LYMPH NODE BIOPSY RIGHT AXILLA performed by Ronnie Santos MD at [facility] OR    COLONOSCOPY  2021    [facility]    COSMETIC SURGERY      DILATATION, ESOPHAGUS      HERNIA REPAIR Right 02/23/2023    Robotic Right Inguinal Hernia Repair with Mesh performed by Lester Espino MD at [facility] OR    INGUINAL HERNIA REPAIR Left     30 years ago    KNEE SURGERY Left     OTHER SURGICAL HISTORY  04/04/2017    robotic ileocecectomy-Dr Espino    SKIN CANCER EXCISION  03/2021    melanoma removal Dr Santos- also removed lymph nodes     TURP N/A 02/03/2023    CYSTO, GREENLIGHT PHOTO VAPORIZATION OF PROSTATE performed by Yao Ruby MD at [facility] OR       Allergies   Allergen Reactions    Penicillins      Unsure of reaction HAPPENED WHEN A WAS A TEENAGER OR EARLY TWENTY'S        Family History   Problem Relation Age of Onset    Heart Disease Father     Cancer Father         Social History     Socioeconomic History    Marital status:      Spouse name: Not on file    Number of children: Not on file    Years of education: Not on file    Highest education level: Not on file   Occupational

## 2025-02-10 ENCOUNTER — TELEPHONE (OUTPATIENT)
Dept: FAMILY MEDICINE CLINIC | Age: 75
End: 2025-02-10

## 2025-02-10 NOTE — TELEPHONE ENCOUNTER
Patient needing form signed and faxed to [facility]. TH believes this was already complete. Spoke to patient, who is agreeing to talk with [facility] on 2/13 to see if they have received the signed form. If they have not, patient will call office to notify and [facility] will fax new copy. Phone number and fax to office was given to patient.

## 2025-02-13 ENCOUNTER — TELEPHONE (OUTPATIENT)
Dept: FAMILY MEDICINE CLINIC | Age: 75
End: 2025-02-13

## 2025-02-13 NOTE — TELEPHONE ENCOUNTER
This message copied from  regarding a PT recertification from [facility]:    \"Appears this is a PT recert that needs printed  and signed. It also lists another physician's   name in the spot for signature. Please have   this updated as I will take over the referral and  certification. Thanks!     Contacted [facility]. They will update form and fax to our office again for signature.

## 2025-03-31 ENCOUNTER — OFFICE VISIT (OUTPATIENT)
Dept: FAMILY MEDICINE CLINIC | Age: 75
End: 2025-03-31
Payer: MEDICARE

## 2025-03-31 VITALS
WEIGHT: 200.4 LBS | BODY MASS INDEX: 29.58 KG/M2 | HEART RATE: 92 BPM | SYSTOLIC BLOOD PRESSURE: 126 MMHG | DIASTOLIC BLOOD PRESSURE: 80 MMHG | RESPIRATION RATE: 16 BRPM | TEMPERATURE: 98.2 F

## 2025-03-31 DIAGNOSIS — J20.8 ACUTE BRONCHITIS DUE TO OTHER SPECIFIED ORGANISMS: Primary | ICD-10-CM

## 2025-03-31 DIAGNOSIS — R05.1 ACUTE COUGH: ICD-10-CM

## 2025-03-31 PROCEDURE — G2211 COMPLEX E/M VISIT ADD ON: HCPCS | Performed by: STUDENT IN AN ORGANIZED HEALTH CARE EDUCATION/TRAINING PROGRAM

## 2025-03-31 PROCEDURE — 99213 OFFICE O/P EST LOW 20 MIN: CPT | Performed by: STUDENT IN AN ORGANIZED HEALTH CARE EDUCATION/TRAINING PROGRAM

## 2025-03-31 PROCEDURE — 1123F ACP DISCUSS/DSCN MKR DOCD: CPT | Performed by: STUDENT IN AN ORGANIZED HEALTH CARE EDUCATION/TRAINING PROGRAM

## 2025-03-31 PROCEDURE — 1036F TOBACCO NON-USER: CPT | Performed by: STUDENT IN AN ORGANIZED HEALTH CARE EDUCATION/TRAINING PROGRAM

## 2025-03-31 PROCEDURE — 1159F MED LIST DOCD IN RCRD: CPT | Performed by: STUDENT IN AN ORGANIZED HEALTH CARE EDUCATION/TRAINING PROGRAM

## 2025-03-31 PROCEDURE — 3079F DIAST BP 80-89 MM HG: CPT | Performed by: STUDENT IN AN ORGANIZED HEALTH CARE EDUCATION/TRAINING PROGRAM

## 2025-03-31 PROCEDURE — G8427 DOCREV CUR MEDS BY ELIG CLIN: HCPCS | Performed by: STUDENT IN AN ORGANIZED HEALTH CARE EDUCATION/TRAINING PROGRAM

## 2025-03-31 PROCEDURE — 3074F SYST BP LT 130 MM HG: CPT | Performed by: STUDENT IN AN ORGANIZED HEALTH CARE EDUCATION/TRAINING PROGRAM

## 2025-03-31 PROCEDURE — 3017F COLORECTAL CA SCREEN DOC REV: CPT | Performed by: STUDENT IN AN ORGANIZED HEALTH CARE EDUCATION/TRAINING PROGRAM

## 2025-03-31 PROCEDURE — G8417 CALC BMI ABV UP PARAM F/U: HCPCS | Performed by: STUDENT IN AN ORGANIZED HEALTH CARE EDUCATION/TRAINING PROGRAM

## 2025-03-31 RX ORDER — FLUTICASONE PROPIONATE 50 MCG
SPRAY, SUSPENSION (ML) NASAL
COMMUNITY
Start: 2025-01-30

## 2025-03-31 RX ORDER — DOXYCYCLINE HYCLATE 100 MG
100 TABLET ORAL 2 TIMES DAILY
Qty: 20 TABLET | Refills: 0 | Status: SHIPPED | OUTPATIENT
Start: 2025-03-31 | End: 2025-04-10

## 2025-03-31 RX ORDER — BENZONATATE 200 MG/1
200 CAPSULE ORAL 3 TIMES DAILY PRN
Qty: 30 CAPSULE | Refills: 0 | Status: SHIPPED | OUTPATIENT
Start: 2025-03-31 | End: 2025-04-10

## 2025-03-31 SDOH — ECONOMIC STABILITY: FOOD INSECURITY: WITHIN THE PAST 12 MONTHS, THE FOOD YOU BOUGHT JUST DIDN'T LAST AND YOU DIDN'T HAVE MONEY TO GET MORE.: PATIENT DECLINED

## 2025-03-31 SDOH — ECONOMIC STABILITY: FOOD INSECURITY: WITHIN THE PAST 12 MONTHS, YOU WORRIED THAT YOUR FOOD WOULD RUN OUT BEFORE YOU GOT MONEY TO BUY MORE.: PATIENT DECLINED

## 2025-03-31 ASSESSMENT — PATIENT HEALTH QUESTIONNAIRE - PHQ9
SUM OF ALL RESPONSES TO PHQ QUESTIONS 1-9: 0
2. FEELING DOWN, DEPRESSED OR HOPELESS: NOT AT ALL
SUM OF ALL RESPONSES TO PHQ QUESTIONS 1-9: 0
1. LITTLE INTEREST OR PLEASURE IN DOING THINGS: NOT AT ALL
SUM OF ALL RESPONSES TO PHQ QUESTIONS 1-9: 0
SUM OF ALL RESPONSES TO PHQ QUESTIONS 1-9: 0

## 2025-03-31 ASSESSMENT — ENCOUNTER SYMPTOMS
ABDOMINAL PAIN: 0
SORE THROAT: 1
VOMITING: 0
SHORTNESS OF BREATH: 0
SINUS PRESSURE: 0
SINUS PAIN: 0
DIARRHEA: 0
NAUSEA: 0
COUGH: 1
CONSTIPATION: 0
RHINORRHEA: 1

## 2025-03-31 NOTE — PROGRESS NOTES
Abhilash Treviño is a 74 y.o. male who presents today for:  Chief Complaint   Patient presents with    Congestion     Pt c/o sinusitis with some chest congestion. Pt states symptoms started Thursday and have gotten worse.         HPI:     History of Present Illness  The patient presents for evaluation of cough/congestion.    He reports a history of chronic sinusitis, which he believes has now progressed into his chest. He describes the expectoration of green phlegm and a persistent cough that disrupts his sleep. He does not experience significant nasal congestion or sinus headaches but occasionally feels as though his ears are submerged in water. He also reports a burning sensation in his throat, likely due to postnasal drainage. The cough has been severe enough to wake him up at night, and he has had difficulty sleeping for the past two nights. His wife suggested he seek medical attention due to the worsening symptoms.    Previous management attempts included Flonase, fenugreek, thyme, ALJ, and throat lozenges, but these have not provided relief. He has not previously tried Mucinex or Tessalon. He recalls a potential allergy to penicillin from his youth, but this has not been confirmed. He has previously used amoxicillin and Augmentin without adverse reactions. He notes that his symptoms tend to worsen every 8 to 10 years, following a seasonal pattern that occasionally escalates in severity.        Objective:     Vitals:    03/31/25 1041   BP: 126/80   Pulse: 92   Resp: 16   Temp: 98.2 °F (36.8 °C)   TempSrc: Oral   Weight: 90.9 kg (200 lb 6.4 oz)       Wt Readings from Last 3 Encounters:   03/31/25 90.9 kg (200 lb 6.4 oz)   01/09/25 89.9 kg (198 lb 3.2 oz)   10/14/24 89.8 kg (198 lb)       BP Readings from Last 3 Encounters:   03/31/25 126/80   01/09/25 (!) 161/89   10/14/24 122/72       Lab Results   Component Value Date    WBC 5.6 12/27/2024    HGB 14.4 12/27/2024    HCT 44.4 12/27/2024    MCV 93.1 12/27/2024

## 2025-04-10 ENCOUNTER — OFFICE VISIT (OUTPATIENT)
Dept: UROLOGY | Age: 75
End: 2025-04-10

## 2025-04-10 VITALS — HEIGHT: 69 IN | RESPIRATION RATE: 20 BRPM | WEIGHT: 200 LBS | BODY MASS INDEX: 29.62 KG/M2

## 2025-04-10 DIAGNOSIS — N40.1 BENIGN LOCALIZED PROSTATIC HYPERPLASIA WITH LOWER URINARY TRACT SYMPTOMS (LUTS): Primary | ICD-10-CM

## 2025-04-10 LAB
BILIRUBIN, URINE: NEGATIVE
BLOOD URINE, POC: NEGATIVE
CHARACTER, URINE: CLEAR
COLOR, UA: YELLOW
GLUCOSE URINE: NEGATIVE MG/DL
KETONES, URINE: NEGATIVE
LEUKOCYTE CLUMPS, URINE: NEGATIVE
NITRITE, URINE: NEGATIVE
PH, URINE: 7 (ref 5–9)
POST VOID RESIDUAL (PVR): 25 ML
PROTEIN, URINE: NEGATIVE MG/DL
SPECIFIC GRAVITY UA: 1.01 (ref 1–1.03)
UROBILINOGEN, URINE: 0.2 EU/DL (ref 0–1)

## 2025-04-10 RX ORDER — FINASTERIDE 5 MG/1
5 TABLET, FILM COATED ORAL DAILY
Qty: 90 TABLET | Refills: 3 | Status: SHIPPED | OUTPATIENT
Start: 2025-04-10

## 2025-04-10 RX ORDER — TROSPIUM CHLORIDE 20 MG/1
20 TABLET, FILM COATED ORAL NIGHTLY
Qty: 90 TABLET | Refills: 3 | Status: SHIPPED | OUTPATIENT
Start: 2025-04-10

## 2025-04-10 ASSESSMENT — ENCOUNTER SYMPTOMS
VOMITING: 0
NAUSEA: 0
BACK PAIN: 0
ABDOMINAL PAIN: 0

## 2025-04-10 NOTE — PROGRESS NOTES
[facility] [place] [facility]  [facility] UROLOGY  [address]  [address]  [facility] [address]  Dept: [phone]  Loc: [phone]    Visit Date: 4/10/2025        HPI:     Abhilash Treviño is a 74 y.o. male who presents today for:  Chief Complaint   Patient presents with    Benign Prostatic Hypertrophy    Dysuria       HPI    Pt seen in follow up for BPH.      Pt has a hx of BPH with nocturia up to 10 x per night and urinary retention.  Silodosin failed to improve symptoms.  Trospium and finasteride 5 mg daily.       Underwent cystoscopy, greenlight photovaporization of the prostate 2/3/23 by Dr. Ruby.       Waking 5 x per night on trospium to urinate. (Improved from 8-10 x per night in the past).  Denies dysuria.  No hematuria.          Current Outpatient Medications   Medication Sig Dispense Refill    fluticasone (FLONASE) 50 MCG/ACT nasal spray USE 1 TO 2 SPRAYS IN EACH NOSTRIL ONCE DAILY AS NEEDED      benzonatate (TESSALON) 200 MG capsule Take 1 capsule by mouth 3 times daily as needed for Cough 30 capsule 0    doxycycline hyclate (VIBRA-TABS) 100 MG tablet Take 1 tablet by mouth 2 times daily for 10 days 20 tablet 0    amLODIPine (NORVASC) 10 MG tablet Take 1 tablet by mouth daily 90 tablet 3    Omega-3 Fatty Acids (FISH OIL) 1200 MG CAPS Take by mouth 3 times daily      Red Yeast Rice 600 MG TABS Take by mouth 4 times daily      trospium (SANCTURA) 20 MG tablet Take 1 tablet by mouth at bedtime 90 tablet 3    finasteride (PROSCAR) 5 MG tablet TAKE 1 TABLET BY MOUTH EVERY DAY 90 tablet 3    Coenzyme Q10 (COQ-10) 30 MG CAPS Take by mouth      Magnesium 400 MG TABS Take 1 tablet by mouth daily      Cholecalciferol (D3 ADULT PO) Take 400 mg by mouth in the morning and at bedtime       No current facility-administered medications for this visit.       Past Medical History  Abhilash  has a past medical history of Arthritis, Cancer (HCC), Chronic sinusitis, Hypertension, Restless legs

## 2025-05-21 ENCOUNTER — RESULTS FOLLOW-UP (OUTPATIENT)
Dept: FAMILY MEDICINE CLINIC | Age: 75
End: 2025-05-21

## 2025-05-21 ENCOUNTER — LAB (OUTPATIENT)
Dept: LAB | Age: 75
End: 2025-05-21

## 2025-05-21 DIAGNOSIS — Z12.5 SCREENING PSA (PROSTATE SPECIFIC ANTIGEN): ICD-10-CM

## 2025-05-21 DIAGNOSIS — I10 ESSENTIAL HYPERTENSION: ICD-10-CM

## 2025-05-21 DIAGNOSIS — E78.00 PURE HYPERCHOLESTEROLEMIA: ICD-10-CM

## 2025-05-21 LAB
ALBUMIN SERPL BCG-MCNC: 4.1 G/DL (ref 3.4–4.9)
ALP SERPL-CCNC: 88 U/L (ref 40–129)
ALT SERPL W/O P-5'-P-CCNC: 18 U/L (ref 10–50)
ANION GAP SERPL CALC-SCNC: 11 MEQ/L (ref 8–16)
AST SERPL-CCNC: 22 U/L (ref 10–50)
BASOPHILS ABSOLUTE: 0.1 THOU/MM3 (ref 0–0.1)
BASOPHILS NFR BLD AUTO: 1.3 %
BILIRUB SERPL-MCNC: 0.4 MG/DL (ref 0.3–1.2)
BUN SERPL-MCNC: 16 MG/DL (ref 8–23)
CALCIUM SERPL-MCNC: 9.4 MG/DL (ref 8.8–10.2)
CHLORIDE SERPL-SCNC: 104 MEQ/L (ref 98–111)
CHOLEST SERPL-MCNC: 229 MG/DL (ref 100–199)
CO2 SERPL-SCNC: 26 MEQ/L (ref 22–29)
CREAT SERPL-MCNC: 0.8 MG/DL (ref 0.7–1.2)
DEPRECATED RDW RBC AUTO: 47.7 FL (ref 35–45)
EOSINOPHIL NFR BLD AUTO: 5.9 %
EOSINOPHILS ABSOLUTE: 0.3 THOU/MM3 (ref 0–0.4)
ERYTHROCYTE [DISTWIDTH] IN BLOOD BY AUTOMATED COUNT: 14 % (ref 11.5–14.5)
GFR SERPL CREATININE-BSD FRML MDRD: > 90 ML/MIN/1.73M2
GLUCOSE SERPL-MCNC: 95 MG/DL (ref 74–109)
HCT VFR BLD AUTO: 41.9 % (ref 42–52)
HDLC SERPL-MCNC: 70 MG/DL
HGB BLD-MCNC: 13.8 GM/DL (ref 14–18)
IMM GRANULOCYTES # BLD AUTO: 0.01 THOU/MM3 (ref 0–0.07)
IMM GRANULOCYTES NFR BLD AUTO: 0.2 %
LDLC SERPL CALC-MCNC: 138 MG/DL
LYMPHOCYTES ABSOLUTE: 1.1 THOU/MM3 (ref 1–4.8)
LYMPHOCYTES NFR BLD AUTO: 20.9 %
MCH RBC QN AUTO: 30.6 PG (ref 26–33)
MCHC RBC AUTO-ENTMCNC: 32.9 GM/DL (ref 32.2–35.5)
MCV RBC AUTO: 92.9 FL (ref 80–94)
MONOCYTES ABSOLUTE: 0.5 THOU/MM3 (ref 0.4–1.3)
MONOCYTES NFR BLD AUTO: 9.3 %
NEUTROPHILS ABSOLUTE: 3.3 THOU/MM3 (ref 1.8–7.7)
NEUTROPHILS NFR BLD AUTO: 62.4 %
NRBC BLD AUTO-RTO: 0 /100 WBC
PLATELET # BLD AUTO: 316 THOU/MM3 (ref 130–400)
PMV BLD AUTO: 11.6 FL (ref 9.4–12.4)
POTASSIUM SERPL-SCNC: 3.9 MEQ/L (ref 3.5–5.2)
PROT SERPL-MCNC: 6.9 G/DL (ref 6.4–8.3)
PSA SERPL-MCNC: 0.31 NG/ML (ref 0–1)
RBC # BLD AUTO: 4.51 MILL/MM3 (ref 4.7–6.1)
SODIUM SERPL-SCNC: 141 MEQ/L (ref 135–145)
TRIGL SERPL-MCNC: 104 MG/DL (ref 0–199)
WBC # BLD AUTO: 5.3 THOU/MM3 (ref 4.8–10.8)

## 2025-05-25 SDOH — HEALTH STABILITY: PHYSICAL HEALTH: ON AVERAGE, HOW MANY DAYS PER WEEK DO YOU ENGAGE IN MODERATE TO STRENUOUS EXERCISE (LIKE A BRISK WALK)?: 2 DAYS

## 2025-05-25 SDOH — HEALTH STABILITY: PHYSICAL HEALTH: ON AVERAGE, HOW MANY MINUTES DO YOU ENGAGE IN EXERCISE AT THIS LEVEL?: 20 MIN

## 2025-05-25 ASSESSMENT — LIFESTYLE VARIABLES
HOW OFTEN DO YOU HAVE A DRINK CONTAINING ALCOHOL: 3
HOW MANY STANDARD DRINKS CONTAINING ALCOHOL DO YOU HAVE ON A TYPICAL DAY: 1 OR 2
HOW OFTEN DO YOU HAVE A DRINK CONTAINING ALCOHOL: 2-4 TIMES A MONTH
HOW MANY STANDARD DRINKS CONTAINING ALCOHOL DO YOU HAVE ON A TYPICAL DAY: 1
HOW OFTEN DO YOU HAVE SIX OR MORE DRINKS ON ONE OCCASION: 1

## 2025-05-25 ASSESSMENT — PATIENT HEALTH QUESTIONNAIRE - PHQ9
SUM OF ALL RESPONSES TO PHQ QUESTIONS 1-9: 2
1. LITTLE INTEREST OR PLEASURE IN DOING THINGS: SEVERAL DAYS
2. FEELING DOWN, DEPRESSED OR HOPELESS: SEVERAL DAYS
SUM OF ALL RESPONSES TO PHQ QUESTIONS 1-9: 2

## 2025-05-28 ENCOUNTER — OFFICE VISIT (OUTPATIENT)
Dept: FAMILY MEDICINE CLINIC | Age: 75
End: 2025-05-28

## 2025-05-28 VITALS
HEIGHT: 69 IN | SYSTOLIC BLOOD PRESSURE: 120 MMHG | DIASTOLIC BLOOD PRESSURE: 82 MMHG | WEIGHT: 201.4 LBS | TEMPERATURE: 97.8 F | HEART RATE: 72 BPM | BODY MASS INDEX: 29.83 KG/M2 | RESPIRATION RATE: 18 BRPM

## 2025-05-28 DIAGNOSIS — E78.00 PURE HYPERCHOLESTEROLEMIA: ICD-10-CM

## 2025-05-28 DIAGNOSIS — Z00.00 INITIAL MEDICARE ANNUAL WELLNESS VISIT: Primary | ICD-10-CM

## 2025-05-28 DIAGNOSIS — R29.6 RECURRENT FALLS: ICD-10-CM

## 2025-05-28 DIAGNOSIS — R26.81 GAIT INSTABILITY: ICD-10-CM

## 2025-05-28 DIAGNOSIS — R26.89 BALANCE PROBLEM: ICD-10-CM

## 2025-05-28 DIAGNOSIS — N40.1 BENIGN PROSTATIC HYPERPLASIA WITH LOWER URINARY TRACT SYMPTOMS, SYMPTOM DETAILS UNSPECIFIED: ICD-10-CM

## 2025-05-28 DIAGNOSIS — I10 ESSENTIAL HYPERTENSION: ICD-10-CM

## 2025-05-28 RX ORDER — AMPICILLIN TRIHYDRATE 250 MG
CAPSULE ORAL
COMMUNITY

## 2025-05-28 NOTE — PROGRESS NOTES
Medicare Annual Wellness Visit    Abhilash Treviño is here for Medicare AWV (Annual Wellness Visit /Patient request referral to PT for balance. )    Assessment & Plan   Initial Medicare annual wellness visit  Essential hypertension  Pure hypercholesterolemia  Benign prostatic hyperplasia with lower urinary tract symptoms, symptom details unspecified  Balance problem  -     External Referral To Physical Therapy  Gait instability  -     External Referral To Physical Therapy  Recurrent falls  -     External Referral To Physical Therapy    AWV completed as below.  Preventive care reviewed.  - Declines vaccines at this time    Gait instability, recurrent falls, balance problem: Will refer to PT for treatment    Hypertension: Chronic, stable.  Patient does have some leg swelling on exam, recommend compression stockings and leg elevation.  However, if persistent may need to consider decreasing dose of amlodipine in the future.    HLD: Chronic, mildly improved on recent labs.  Patient recently increased dose of red yeast rice.  Does not want statin therapy at this time.  Will continue to monitor with dietary changes       Return in about 6 months (around 11/28/2025).     Subjective   The following acute and/or chronic problems were also addressed today:    Patient feels he has been having difficulty with his balance, has had 2 falls in the past month.  1 he tripped while trying to clean up blood at home.  Another he was edging his lawn, became off balance and fell onto his left side.  No injuries.  Wife was able to help him get up.  Uses a walking stick for ambulation, but feels he having more difficulty with balance recently.  He is currently in PT for his knee, would like to do PT for balance as well.    Hypertension: BP stable.  Remains on amlodipine 10 mg daily.  He does have some leg swelling, this tends to get worse towards the end of the day after he is standing for long periods.  He has compression stockings but is

## 2025-05-28 NOTE — PROGRESS NOTES
Health Maintenance Due   Topic Date Due    Hepatitis C screen  Never done    DTaP/Tdap/Td vaccine (1 - Tdap) Never done    Shingles vaccine (1 of 2) Never done    Pneumococcal 50+ years Vaccine (1 of 1 - PCV) Never done    COVID-19 Vaccine (1 - 2024-25 season) Never done    Annual Wellness Visit (Medicare)  05/11/2025

## (undated) DEVICE — BANDAGE,GAUZE,4.5"X4.1YD,STERILE,LF: Brand: MEDLINE

## (undated) DEVICE — GARMENT,MEDLINE,DVT,INT,CALF,MED, GEN2: Brand: MEDLINE

## (undated) DEVICE — SUTURE V-LOC 180 SZ 3-0 L9IN ABSRB GRN L26MM V-20 1/2 CIR VLOCL0644

## (undated) DEVICE — 3M™ STERI-STRIP™ COMPOUND BENZOIN TINCTURE 40 BAGS/CARTON 4 CARTONS/CASE C1544: Brand: 3M™ STERI-STRIP™

## (undated) DEVICE — COLUMN DRAPE

## (undated) DEVICE — BANDAGE ADH W1XL3IN NAT FAB WVN FLX DURABLE N ADH PD SEAL

## (undated) DEVICE — BANDAGE COMPR M W4INXL10YD WHT BGE VELC E MTRX HK AND LOOP

## (undated) DEVICE — GLOVE ORANGE PI 7   MSG9070

## (undated) DEVICE — BASIC SINGLE BASIN BTC-LF: Brand: MEDLINE INDUSTRIES, INC.

## (undated) DEVICE — JP CHANNEL DRAIN, 19FR HUBLESS: Brand: CARDINAL HEALTH

## (undated) DEVICE — CHLORAPREP 26ML CLEAR

## (undated) DEVICE — GLOVE SURG SZ 8 L11.77IN FNGR THK9.8MIL STRW LTX POLYMER

## (undated) DEVICE — GOWN,SIRUS,NON REINFRCD,LARGE,SET IN SL: Brand: MEDLINE

## (undated) DEVICE — INSUFFLATION NEEDLE TO ESTABLISH PNEUMOPERITONEUM.: Brand: INSUFFLATION NEEDLE

## (undated) DEVICE — SPONGE,NEURO,1"X3",XR,STRL,LF,10/PK: Brand: MEDLINE

## (undated) DEVICE — SUTURE ETHBND EXCEL SZ 0 L36IN NONABSORBABLE GRN SH L26MM X524H

## (undated) DEVICE — COTTON BALL ST

## (undated) DEVICE — SUTURE NONABSORBABLE BRAIDED 4-0 SH 30 IN BLK PERMA HND K831H

## (undated) DEVICE — NEEDLE HYPO 27GA L1.25IN GRY POLYPR HUB S STL REG BVL STR

## (undated) DEVICE — SUTURE V-LOC 180 SZ 3-0 L6IN ABSRB GRN V-20 L26MM 1/2 CIR VLOCL0604

## (undated) DEVICE — GLOVE ORANGE PI 8   MSG9080

## (undated) DEVICE — BLADELESS OBTURATOR: Brand: WECK VISTA

## (undated) DEVICE — SUTURE MCRYL SZ 4-0 L18IN ABSRB UD P-3 L13MM 3/8 CIR PRIM Y494G

## (undated) DEVICE — PACK PROCEDURE SURG PLAS SC MIN SRHP LF

## (undated) DEVICE — ARM DRAPE

## (undated) DEVICE — 35 ML SYRINGE LUER-LOCK TIP: Brand: MONOJECT

## (undated) DEVICE — ELECTRO LUBE IS A SINGLE PATIENT USE DEVICE THAT IS INTENDED TO BE USED ON ELECTROSURGICAL ELECTRODES TO REDUCE STICKING.: Brand: KEY SURGICAL ELECTRO LUBE

## (undated) DEVICE — TIP COVER ACCESSORY

## (undated) DEVICE — SHEETS DRAW

## (undated) DEVICE — GENERAL LAPAROSCOPY-LF: Brand: MEDLINE INDUSTRIES, INC.

## (undated) DEVICE — DRAIN TO NON J-P RES CONNECTOR: Brand: CARDINAL HEALTH

## (undated) DEVICE — CANNULA SEAL

## (undated) DEVICE — STRIP,CLOSURE,WOUND,MEDI-STRIP,1/2X4: Brand: MEDLINE

## (undated) DEVICE — TUBING INSUFFLATOR HEAT HUMIDIFIED SMK EVAC SET PNEUMOCLEAR